# Patient Record
Sex: FEMALE | Race: WHITE | NOT HISPANIC OR LATINO | Employment: FULL TIME | URBAN - METROPOLITAN AREA
[De-identification: names, ages, dates, MRNs, and addresses within clinical notes are randomized per-mention and may not be internally consistent; named-entity substitution may affect disease eponyms.]

---

## 2019-06-18 ENCOUNTER — OFFICE VISIT (OUTPATIENT)
Dept: DERMATOLOGY | Facility: CLINIC | Age: 64
End: 2019-06-18
Payer: COMMERCIAL

## 2019-06-18 DIAGNOSIS — L82.1 SEBORRHEIC KERATOSIS: Primary | ICD-10-CM

## 2019-06-18 DIAGNOSIS — L91.8 SKIN TAG: ICD-10-CM

## 2019-06-18 DIAGNOSIS — Z13.89 SCREENING FOR SKIN CONDITION: ICD-10-CM

## 2019-06-18 PROCEDURE — 99203 OFFICE O/P NEW LOW 30 MIN: CPT | Performed by: DERMATOLOGY

## 2019-11-21 ENCOUNTER — APPOINTMENT (EMERGENCY)
Dept: RADIOLOGY | Facility: HOSPITAL | Age: 64
End: 2019-11-21
Payer: COMMERCIAL

## 2019-11-21 ENCOUNTER — HOSPITAL ENCOUNTER (EMERGENCY)
Facility: HOSPITAL | Age: 64
Discharge: HOME | End: 2019-11-21
Attending: EMERGENCY MEDICINE
Payer: COMMERCIAL

## 2019-11-21 VITALS
SYSTOLIC BLOOD PRESSURE: 185 MMHG | RESPIRATION RATE: 18 BRPM | HEART RATE: 66 BPM | WEIGHT: 179.4 LBS | BODY MASS INDEX: 29.89 KG/M2 | TEMPERATURE: 97.7 F | OXYGEN SATURATION: 100 % | DIASTOLIC BLOOD PRESSURE: 96 MMHG | HEIGHT: 65 IN

## 2019-11-21 DIAGNOSIS — I10 HIGH BLOOD PRESSURE: Primary | ICD-10-CM

## 2019-11-21 LAB
ALBUMIN SERPL BCP-MCNC: 4 G/DL (ref 3.5–5)
ALP SERPL-CCNC: 104 U/L (ref 46–116)
ALT SERPL W P-5'-P-CCNC: 33 U/L (ref 12–78)
ANION GAP SERPL CALCULATED.3IONS-SCNC: 8 MMOL/L (ref 4–13)
AST SERPL W P-5'-P-CCNC: 16 U/L (ref 5–45)
BASOPHILS # BLD AUTO: 0.04 THOUSANDS/ΜL (ref 0–0.1)
BASOPHILS NFR BLD AUTO: 1 % (ref 0–1)
BILIRUB SERPL-MCNC: 0.4 MG/DL (ref 0.2–1)
BUN SERPL-MCNC: 16 MG/DL (ref 5–25)
CALCIUM SERPL-MCNC: 9.8 MG/DL (ref 8.3–10.1)
CHLORIDE SERPL-SCNC: 105 MMOL/L (ref 100–108)
CO2 SERPL-SCNC: 29 MMOL/L (ref 21–32)
CREAT SERPL-MCNC: 0.64 MG/DL (ref 0.6–1.3)
EOSINOPHIL # BLD AUTO: 0.1 THOUSAND/ΜL (ref 0–0.61)
EOSINOPHIL NFR BLD AUTO: 2 % (ref 0–6)
ERYTHROCYTE [DISTWIDTH] IN BLOOD BY AUTOMATED COUNT: 12.3 % (ref 11.6–15.1)
GFR SERPL CREATININE-BSD FRML MDRD: 95 ML/MIN/1.73SQ M
GLUCOSE SERPL-MCNC: 104 MG/DL (ref 65–140)
HCT VFR BLD AUTO: 42.5 % (ref 34.8–46.1)
HGB BLD-MCNC: 14.1 G/DL (ref 11.5–15.4)
IMM GRANULOCYTES # BLD AUTO: 0.01 THOUSAND/UL (ref 0–0.2)
IMM GRANULOCYTES NFR BLD AUTO: 0 % (ref 0–2)
LYMPHOCYTES # BLD AUTO: 1.68 THOUSANDS/ΜL (ref 0.6–4.47)
LYMPHOCYTES NFR BLD AUTO: 31 % (ref 14–44)
MCH RBC QN AUTO: 30.3 PG (ref 26.8–34.3)
MCHC RBC AUTO-ENTMCNC: 33.2 G/DL (ref 31.4–37.4)
MCV RBC AUTO: 91 FL (ref 82–98)
MONOCYTES # BLD AUTO: 0.42 THOUSAND/ΜL (ref 0.17–1.22)
MONOCYTES NFR BLD AUTO: 8 % (ref 4–12)
NEUTROPHILS # BLD AUTO: 3.12 THOUSANDS/ΜL (ref 1.85–7.62)
NEUTS SEG NFR BLD AUTO: 58 % (ref 43–75)
NRBC BLD AUTO-RTO: 0 /100 WBCS
PLATELET # BLD AUTO: 284 THOUSANDS/UL (ref 149–390)
PMV BLD AUTO: 9.9 FL (ref 8.9–12.7)
POTASSIUM SERPL-SCNC: 3.9 MMOL/L (ref 3.5–5.3)
PROT SERPL-MCNC: 7.9 G/DL (ref 6.4–8.2)
RBC # BLD AUTO: 4.65 MILLION/UL (ref 3.81–5.12)
SODIUM SERPL-SCNC: 142 MMOL/L (ref 136–145)
TROPONIN I SERPL-MCNC: <0.02 NG/ML
WBC # BLD AUTO: 5.37 THOUSAND/UL (ref 4.31–10.16)

## 2019-11-21 PROCEDURE — 85025 COMPLETE CBC W/AUTO DIFF WBC: CPT | Performed by: EMERGENCY MEDICINE

## 2019-11-21 PROCEDURE — 99284 EMERGENCY DEPT VISIT MOD MDM: CPT

## 2019-11-21 PROCEDURE — 84484 ASSAY OF TROPONIN QUANT: CPT | Performed by: EMERGENCY MEDICINE

## 2019-11-21 PROCEDURE — 71046 X-RAY EXAM CHEST 2 VIEWS: CPT

## 2019-11-21 PROCEDURE — 99284 EMERGENCY DEPT VISIT MOD MDM: CPT | Performed by: EMERGENCY MEDICINE

## 2019-11-21 PROCEDURE — 36415 COLL VENOUS BLD VENIPUNCTURE: CPT | Performed by: EMERGENCY MEDICINE

## 2019-11-21 PROCEDURE — 93005 ELECTROCARDIOGRAM TRACING: CPT

## 2019-11-21 PROCEDURE — 80053 COMPREHEN METABOLIC PANEL: CPT | Performed by: EMERGENCY MEDICINE

## 2019-11-22 LAB
ATRIAL RATE: 69 BPM
P AXIS: 69 DEGREES
PR INTERVAL: 174 MS
QRS AXIS: 54 DEGREES
QRSD INTERVAL: 88 MS
QT INTERVAL: 418 MS
QTC INTERVAL: 447 MS
T WAVE AXIS: 66 DEGREES
VENTRICULAR RATE: 69 BPM

## 2019-11-22 PROCEDURE — 93010 ELECTROCARDIOGRAM REPORT: CPT | Performed by: INTERNAL MEDICINE

## 2019-11-22 NOTE — ED NOTES
Spoke with Joanie Granger from [facility] and informed her that patient was discharged       Maik Stevenson RN  11/21/19 2013

## 2019-11-22 NOTE — ED NOTES
Spoke with Dana Garcia RN from [facility] with update on patient        Sam Lam RN  11/21/19 2012 Statement Selected

## 2019-12-03 NOTE — ED PROVIDER NOTES
History  Chief Complaint   Patient presents with    High Blood Pressure     pt was [facility] and had BP checked and it was "high so they told me to come here" pt denies any symptoms at this time     62 eliel old female presenitng to the emergency department for eval of HTN  Recently checked into [facility] facility and had intake vitals with High bp, referred tot the er for eval  SHe has no Sx or complains, no Hx of HTN, last drink was several days ago but has never jenna into withdrawal, denies nausea/vomiting, diaphoresis or tremors  None       History reviewed  No pertinent past medical history  History reviewed  No pertinent surgical history  History reviewed  No pertinent family history  I have reviewed and agree with the history as documented  Social History     Tobacco Use    Smoking status: Current Every Day Smoker    Smokeless tobacco: Never Used   Substance Use Topics    Alcohol use: Not Currently    Drug use: Not on file        Review of Systems   Constitutional: Negative for appetite change, chills, fatigue and fever  HENT: Negative for sneezing and sore throat  Eyes: Negative for visual disturbance  Respiratory: Negative for cough, choking, chest tightness, shortness of breath and wheezing  Cardiovascular: Negative for chest pain and palpitations  Gastrointestinal: Negative for abdominal pain, constipation, diarrhea, nausea and vomiting  Genitourinary: Negative for difficulty urinating and dysuria  Neurological: Negative for dizziness, weakness, light-headedness, numbness and headaches  All other systems reviewed and are negative  Physical Exam  Physical Exam   Constitutional: She is oriented to person, place, and time  She appears well-developed and well-nourished  No distress  HENT:   Head: Normocephalic and atraumatic  Mouth/Throat: Oropharynx is clear and moist    Eyes: Pupils are equal, round, and reactive to light   EOM are normal    Neck: No JVD present  No tracheal deviation present  Cardiovascular: Normal rate, regular rhythm, normal heart sounds and intact distal pulses  Exam reveals no gallop and no friction rub  No murmur heard  Pulmonary/Chest: Effort normal and breath sounds normal  No respiratory distress  She has no wheezes  She has no rales  Abdominal: Soft  Bowel sounds are normal  She exhibits no distension  There is no tenderness  There is no rebound and no guarding  Neurological: She is alert and oriented to person, place, and time  No cranial nerve deficit  She exhibits normal muscle tone  Skin: Skin is warm and dry  She is not diaphoretic  No pallor  Psychiatric: She has a normal mood and affect  Her behavior is normal    Nursing note and vitals reviewed        Vital Signs  ED Triage Vitals [11/21/19 1705]   Temperature Pulse Respirations Blood Pressure SpO2   97 7 °F (36 5 °C) 83 18 (!) 179/104 99 %      Temp Source Heart Rate Source Patient Position - Orthostatic VS BP Location FiO2 (%)   Oral Monitor Sitting Left arm --      Pain Score       No Pain           Vitals:    11/21/19 1705 11/21/19 1930   BP: (!) 179/104 (!) 185/96   Pulse: 83 66   Patient Position - Orthostatic VS: Sitting Lying         Visual Acuity      ED Medications  Medications - No data to display    Diagnostic Studies  Results Reviewed     Procedure Component Value Units Date/Time    Troponin I [[id number]]  (Normal) Collected:  11/21/19 1842    Lab Status:  Final result Specimen:  Blood from Arm, Left Updated:  11/21/19 1909     Troponin I <0 02 ng/mL     Comprehensive metabolic panel [[id number]] Collected:  11/21/19 1842    Lab Status:  Final result Specimen:  Blood from Arm, Left Updated:  11/21/19 1907     Sodium 142 mmol/L      Potassium 3 9 mmol/L      Chloride 105 mmol/L      CO2 29 mmol/L      ANION GAP 8 mmol/L      BUN 16 mg/dL      Creatinine 0 64 mg/dL      Glucose 104 mg/dL      Calcium 9 8 mg/dL      AST 16 U/L ALT 33 U/L      Alkaline Phosphatase 104 U/L      Total Protein 7 9 g/dL      Albumin 4 0 g/dL      Total Bilirubin 0 40 mg/dL      eGFR 95 ml/min/1 73sq m     Narrative:       National Kidney Disease Foundation guidelines for Chronic Kidney Disease (CKD):     Stage 1 with normal or high GFR (GFR > 90 mL/min/1 73 square meters)    Stage 2 Mild CKD (GFR = 60-89 mL/min/1 73 square meters)    Stage 3A Moderate CKD (GFR = 45-59 mL/min/1 73 square meters)    Stage 3B Moderate CKD (GFR = 30-44 mL/min/1 73 square meters)    Stage 4 Severe CKD (GFR = 15-29 mL/min/1 73 square meters)    Stage 5 End Stage CKD (GFR <15 mL/min/1 73 square meters)  Note: GFR calculation is accurate only with a steady state creatinine    CBC and differential [[id number]] Collected:  11/21/19 1842    Lab Status:  Final result Specimen:  Blood from Arm, Left Updated:  11/21/19 1852     WBC 5 37 Thousand/uL      RBC 4 65 Million/uL      Hemoglobin 14 1 g/dL      Hematocrit 42 5 %      MCV 91 fL      MCH 30 3 pg      MCHC 33 2 g/dL      RDW 12 3 %      MPV 9 9 fL      Platelets 519 Thousands/uL      nRBC 0 /100 WBCs      Neutrophils Relative 58 %      Immat GRANS % 0 %      Lymphocytes Relative 31 %      Monocytes Relative 8 %      Eosinophils Relative 2 %      Basophils Relative 1 %      Neutrophils Absolute 3 12 Thousands/µL      Immature Grans Absolute 0 01 Thousand/uL      Lymphocytes Absolute 1 68 Thousands/µL      Monocytes Absolute 0 42 Thousand/µL      Eosinophils Absolute 0 10 Thousand/µL      Basophils Absolute 0 04 Thousands/µL                  XR chest 2 views   ED Interpretation by Terrence Lopez MD (11/21 0748)   No acute cardiopulmonary disease      Final Result by Zoraida Diana MD (11/21 2032)      No acute cardiopulmonary disease              Workstation performed: [id number]                    Procedures  Procedures         ED Course                               MDM  Number of Diagnoses or Management Options  High blood pressure:   Diagnosis management comments: 62year old female with asymptomatic HTN, will check labs, ekg, if unremarkable will clear for rehab        Disposition  Final diagnoses:   High blood pressure     Time reflects when diagnosis was documented in both MDM as applicable and the Disposition within this note     Time User Action Codes Description Comment    11/21/2019  7:37 PM Joe Hernadez Add [I10] High blood pressure       ED Disposition     ED Disposition Condition Date/Time Comment    Discharge Stable Thu Nov 21, 2019  7:37 PM [address] [place] [address] discharge to home/self care  Follow-up Information    None         There are no discharge medications for this patient  No discharge procedures on file      ED Provider  Electronically Signed by           Adi Grant MD  12/03/19 [id number]

## 2022-01-18 ENCOUNTER — OFFICE VISIT (OUTPATIENT)
Dept: FAMILY MEDICINE CLINIC | Facility: CLINIC | Age: 67
End: 2022-01-18
Payer: MEDICARE

## 2022-01-18 VITALS
OXYGEN SATURATION: 97 % | HEIGHT: 65 IN | WEIGHT: 180 LBS | HEART RATE: 70 BPM | BODY MASS INDEX: 29.99 KG/M2 | TEMPERATURE: 97 F | SYSTOLIC BLOOD PRESSURE: 182 MMHG | DIASTOLIC BLOOD PRESSURE: 98 MMHG

## 2022-01-18 DIAGNOSIS — Z12.11 COLON CANCER SCREENING: ICD-10-CM

## 2022-01-18 DIAGNOSIS — Z78.0 ASYMPTOMATIC POSTMENOPAUSAL STATE: ICD-10-CM

## 2022-01-18 DIAGNOSIS — Z12.31 BREAST CANCER SCREENING BY MAMMOGRAM: ICD-10-CM

## 2022-01-18 DIAGNOSIS — Z00.00 ENCOUNTER FOR MEDICARE ANNUAL WELLNESS EXAM: Primary | ICD-10-CM

## 2022-01-18 DIAGNOSIS — I10 PRIMARY HYPERTENSION: ICD-10-CM

## 2022-01-18 PROBLEM — E78.5 HYPERLIPIDEMIA: Status: ACTIVE | Noted: 2019-03-30

## 2022-01-18 PROCEDURE — G0403 EKG FOR INITIAL PREVENT EXAM: HCPCS | Performed by: FAMILY MEDICINE

## 2022-01-18 PROCEDURE — G0402 INITIAL PREVENTIVE EXAM: HCPCS | Performed by: FAMILY MEDICINE

## 2022-01-18 PROCEDURE — 1123F ACP DISCUSS/DSCN MKR DOCD: CPT | Performed by: FAMILY MEDICINE

## 2022-01-18 PROCEDURE — 99203 OFFICE O/P NEW LOW 30 MIN: CPT | Performed by: FAMILY MEDICINE

## 2022-01-18 PROCEDURE — 99497 ADVNCD CARE PLAN 30 MIN: CPT | Performed by: FAMILY MEDICINE

## 2022-01-18 RX ORDER — HYDROCHLOROTHIAZIDE 12.5 MG/1
12.5 TABLET ORAL DAILY
Qty: 30 TABLET | Refills: 0 | Status: SHIPPED | OUTPATIENT
Start: 2022-01-18 | End: 2022-04-29

## 2022-01-18 NOTE — PATIENT INSTRUCTIONS
Medicare Preventive Visit Patient Instructions  Thank you for completing your Welcome to Medicare Visit or Medicare Annual Wellness Visit today  Your next wellness visit will be due in one year (1/19/2023)  The screening/preventive services that you may require over the next 5-10 years are detailed below  Some tests may not apply to you based off risk factors and/or age  Screening tests ordered at today's visit but not completed yet may show as past due  Also, please note that scanned in results may not display below  Preventive Screenings:  Service Recommendations Previous Testing/Comments   Colorectal Cancer Screening  * Colonoscopy    * Fecal Occult Blood Test (FOBT)/Fecal Immunochemical Test (FIT)  * Fecal DNA/Cologuard Test  * Flexible Sigmoidoscopy Age: 54-65 years old   Colonoscopy: every 10 years (may be performed more frequently if at higher risk)  OR  FOBT/FIT: every 1 year  OR  Cologuard: every 3 years  OR  Sigmoidoscopy: every 5 years  Screening may be recommended earlier than age 48 if at higher risk for colorectal cancer  Also, an individualized decision between you and your healthcare provider will decide whether screening between the ages of 74-80 would be appropriate  Colonoscopy: Not on file  FOBT/FIT: Not on file  Cologuard: Not on file  Sigmoidoscopy: Not on file          Breast Cancer Screening Age: 36 years old  Frequency: every 1-2 years  Not required if history of left and right mastectomy Mammogram: Not on file        Cervical Cancer Screening Between the ages of 21-29, pap smear recommended once every 3 years  Between the ages of 33-67, can perform pap smear with HPV co-testing every 5 years     Recommendations may differ for women with a history of total hysterectomy, cervical cancer, or abnormal pap smears in past  Pap Smear: Not on file    Screening Not Indicated   Hepatitis C Screening Once for adults born between [facility]  More frequently in patients at high risk for Hepatitis C Hep C Antibody: Not on file        Diabetes Screening 1-2 times per year if you're at risk for diabetes or have pre-diabetes Fasting glucose: No results in last 5 years   A1C: No results in last 5 years        Cholesterol Screening Once every 5 years if you don't have a lipid disorder  May order more often based on risk factors  Lipid panel: Not on file          Other Preventive Screenings Covered by Medicare:  1  Abdominal Aortic Aneurysm (AAA) Screening: covered once if your at risk  You're considered to be at risk if you have a family history of AAA  2  Lung Cancer Screening: covers low dose CT scan once per year if you meet all of the following conditions: (1) Age 50-69; (2) No signs or symptoms of lung cancer; (3) Current smoker or have quit smoking within the last 15 years; (4) You have a tobacco smoking history of at least 30 pack years (packs per day multiplied by number of years you smoked); (5) You get a written order from a healthcare provider  3  Glaucoma Screening: covered annually if you're considered high risk: (1) You have diabetes OR (2) Family history of glaucoma OR (3)  aged 48 and older OR (3)  American aged 72 and older  3  Osteoporosis Screening: covered every 2 years if you meet one of the following conditions: (1) You're estrogen deficient and at risk for osteoporosis based off medical history and other findings; (2) Have a vertebral abnormality; (3) On glucocorticoid therapy for more than 3 months; (4) Have primary hyperparathyroidism; (5) On osteoporosis medications and need to assess response to drug therapy  · Last bone density test (DXA Scan): Not on file  5  HIV Screening: covered annually if you're between the age of 12-76  Also covered annually if you are younger than 13 and older than 72 with risk factors for HIV infection  For pregnant patients, it is covered up to 3 times per pregnancy      Immunizations:  Immunization Recommendations Influenza Vaccine Annual influenza vaccination during flu season is recommended for all persons aged >= 6 months who do not have contraindications   Pneumococcal Vaccine (Prevnar and Pneumovax)  * Prevnar = PCV13  * Pneumovax = PPSV23   Adults 25-60 years old: 1-3 doses may be recommended based on certain risk factors  Adults 72 years old: Prevnar (PCV13) vaccine recommended followed by Pneumovax (PPSV23) vaccine  If already received PPSV23 since turning 65, then PCV13 recommended at least one year after PPSV23 dose  Hepatitis B Vaccine 3 dose series if at intermediate or high risk (ex: diabetes, end stage renal disease, liver disease)   Tetanus (Td) Vaccine - COST NOT COVERED BY MEDICARE PART B Following completion of primary series, a booster dose should be given every 10 years to maintain immunity against tetanus  Td may also be given as tetanus wound prophylaxis  Tdap Vaccine - COST NOT COVERED BY MEDICARE PART B Recommended at least once for all adults  For pregnant patients, recommended with each pregnancy  Shingles Vaccine (Shingrix) - COST NOT COVERED BY MEDICARE PART B  2 shot series recommended in those aged 48 and above     Health Maintenance Due:      Topic Date Due    Hepatitis C Screening  Never done    HIV Screening  Never done    Breast Cancer Screening: Mammogram  Never done    Colorectal Cancer Screening  Never done     Immunizations Due:      Topic Date Due    COVID-19 Vaccine (1) Never done    DTaP,Tdap,and Td Vaccines (1 - Tdap) Never done    Influenza Vaccine (1) Never done    Pneumococcal Vaccine: 65+ Years (1 of 1 - PPSV23) Never done     Advance Directives   What are advance directives? Advance directives are legal documents that state your wishes and plans for medical care  These plans are made ahead of time in case you lose your ability to make decisions for yourself   Advance directives can apply to any medical decision, such as the treatments you want, and if you want to donate organs  What are the types of advance directives? There are many types of advance directives, and each state has rules about how to use them  You may choose a combination of any of the following:  · Living will: This is a written record of the treatment you want  You can also choose which treatments you do not want, which to limit, and which to stop at a certain time  This includes surgery, medicine, IV fluid, and tube feedings  · Durable power of  for healthcare [facility]): This is a written record that states who you want to make healthcare choices for you when you are unable to make them for yourself  This person, called a proxy, is usually a family member or a friend  You may choose more than 1 proxy  · Do not resuscitate (DNR) order:  A DNR order is used in case your heart stops beating or you stop breathing  It is a request not to have certain forms of treatment, such as CPR  A DNR order may be included in other types of advance directives  · Medical directive: This covers the care that you want if you are in a coma, near death, or unable to make decisions for yourself  You can list the treatments you want for each condition  Treatment may include pain medicine, surgery, blood transfusions, dialysis, IV or tube feedings, and a ventilator (breathing machine)  · Values history: This document has questions about your views, beliefs, and how you feel and think about life  This information can help others choose the care that you would choose  Why are advance directives important? An advance directive helps you control your care  Although spoken wishes may be used, it is better to have your wishes written down  Spoken wishes can be misunderstood, or not followed  Treatments may be given even if you do not want them  An advance directive may make it easier for your family to make difficult choices about your care     Fall Prevention    Fall prevention  includes ways to make your home and other areas safer  It also includes ways you can move more carefully to prevent a fall  Health conditions that cause changes in your blood pressure, vision, or muscle strength and coordination may increase your risk for falls  Medicines may also increase your risk for falls if they make you dizzy, weak, or sleepy  Fall prevention tips:   · Stand or sit up slowly  · Use assistive devices as directed  · Wear shoes that fit well and have soles that   · Wear a personal alarm  · Stay active  · Manage your medical conditions  Home Safety Tips:  · Add items to prevent falls in the bathroom  · Keep paths clear  · Install bright lights in your home  · Keep items you use often on shelves within reach  · Paint or place reflective tape on the edges of your stairs  Weight Management   Why it is important to manage your weight:  Being overweight increases your risk of health conditions such as heart disease, high blood pressure, type 2 diabetes, and certain types of cancer  It can also increase your risk for osteoarthritis, sleep apnea, and other respiratory problems  Aim for a slow, steady weight loss  Even a small amount of weight loss can lower your risk of health problems  How to lose weight safely:  A safe and healthy way to lose weight is to eat fewer calories and get regular exercise  You can lose up about 1 pound a week by decreasing the number of calories you eat by 500 calories each day  Healthy meal plan for weight management:  A healthy meal plan includes a variety of foods, contains fewer calories, and helps you stay healthy  A healthy meal plan includes the following:  · Eat whole-grain foods more often  A healthy meal plan should contain fiber  Fiber is the part of grains, fruits, and vegetables that is not broken down by your body  Whole-grain foods are healthy and provide extra fiber in your diet   Some examples of whole-grain foods are whole-wheat breads and pastas, oatmeal, brown rice, and bulgur  · Eat a variety of vegetables every day  Include dark, leafy greens such as spinach, kale, tanesha greens, and mustard greens  Eat yellow and orange vegetables such as carrots, sweet potatoes, and winter squash  · Eat a variety of fruits every day  Choose fresh or canned fruit (canned in its own juice or light syrup) instead of juice  Fruit juice has very little or no fiber  · Eat low-fat dairy foods  Drink fat-free (skim) milk or 1% milk  Eat fat-free yogurt and low-fat cottage cheese  Try low-fat cheeses such as mozzarella and other reduced-fat cheeses  · Choose meat and other protein foods that are low in fat  Choose beans or other legumes such as split peas or lentils  Choose fish, skinless poultry (chicken or turkey), or lean cuts of red meat (beef or pork)  Before you cook meat or poultry, cut off any visible fat  · Use less fat and oil  Try baking foods instead of frying them  Add less fat, such as margarine, sour cream, regular salad dressing and mayonnaise to foods  Eat fewer high-fat foods  Some examples of high-fat foods include french fries, doughnuts, ice cream, and cakes  · Eat fewer sweets  Limit foods and drinks that are high in sugar  This includes candy, cookies, regular soda, and sweetened drinks  Exercise:  Exercise at least 30 minutes per day on most days of the week  Some examples of exercise include walking, biking, dancing, and swimming  You can also fit in more physical activity by taking the stairs instead of the elevator or parking farther away from stores  Ask your healthcare provider about the best exercise plan for you  © Copyright [employer] 2018 Information is for End User's use only and may not be sold, redistributed or otherwise used for commercial purposes   All illustrations and images included in CareNotes® are the copyrighted property of A [employer] M [employer]  or [address] [place] [address]

## 2022-01-18 NOTE — PROGRESS NOTES
e Assessment and Plan:     Problem List Items Addressed This Visit     None      Visit Diagnoses     Encounter for Medicare annual wellness exam    -  Primary    Relevant Orders    POCT ECG    Primary hypertension        Relevant Orders    Lipid panel    Comprehensive metabolic panel    CBC and differential    TSH, 3rd generation with Free T4 reflex    POCT ECG    Breast cancer screening by mammogram        Relevant Orders    Mammo screening bilateral w 3d & cad    Colon cancer screening        Relevant Orders    Cologuard    Asymptomatic postmenopausal state        Relevant Orders    DXA bone density spine hip and pelvis        BMI Counseling: Body mass index is 29 95 kg/m²  The BMI is above normal  Nutrition recommendations include decreasing portion sizes, encouraging healthy choices of fruits and vegetables, consuming healthier snacks, limiting drinks that contain sugar, moderation in carbohydrate intake, increasing intake of lean protein and reducing intake of cholesterol  Exercise recommendations include moderate physical activity 150 minutes/week and exercising 3-5 times per week  No pharmacotherapy was ordered  Rationale for BMI follow-up plan is due to patient being overweight or obese  Depression Screening and Follow-up Plan: Patient was screened for depression during today's encounter  They screened negative with a PHQ-2 score of 0  Preventive health issues were discussed with patient, and age appropriate screening tests were ordered as noted in patient's After Visit Summary  Personalized health advice and appropriate referrals for health education or preventive services given if needed, as noted in patient's After Visit Summary  History of Present Illness:     Patient presents for Welcome to Medicare visit       Patient Care Team:  Talya Kendall DO as PCP - General (Family Medicine)     Review of Systems:     Review of Systems   Problem List:     Patient Active Problem List   Diagnosis    Hyperlipidemia      Past Medical and Surgical History:     Past Medical History:   Diagnosis Date    Hypertension      Past Surgical History:   Procedure Laterality Date    APPENDECTOMY        Family History:     Family History   Problem Relation Age of Onset    Heart disease Mother     Heart disease Father     Arrhythmia Son       Social History:     Social History     Socioeconomic History    Marital status: /Civil Union     Spouse name: None    Number of children: None    Years of education: None    Highest education level: None   Occupational History    None   Tobacco Use    Smoking status: Never Smoker    Smokeless tobacco: Never Used   Vaping Use    Vaping Use: Never used   Substance and Sexual Activity    Alcohol use: Yes     Alcohol/week: 10 0 standard drinks     Types: 10 Glasses of wine per week    Drug use: Never    Sexual activity: Yes     Partners: Male   Other Topics Concern    None   Social History Narrative    None     Social Determinants of Health     Financial Resource Strain: Not on file   Food Insecurity: Not on file   Transportation Needs: Not on file   Physical Activity: Not on file   Stress: Not on file   Social Connections: Not on file   Intimate Partner Violence: Not on file   Housing Stability: Not on file      Medications and Allergies:     Current Outpatient Medications   Medication Sig Dispense Refill    Digestive Enzymes (DIGESTIVE ENZYME PO) Take by mouth 3 (three) times a day Taken after meals      Probiotic Product (PROBIOTIC PO) Take by mouth in the morning       No current facility-administered medications for this visit  No Known Allergies   Immunizations: There is no immunization history on file for this patient     Health Maintenance:         Topic Date Due    Hepatitis C Screening  Never done    HIV Screening  Never done    Breast Cancer Screening: Mammogram  Never done    Colorectal Cancer Screening  Never done         Topic Date Due    COVID-19 Vaccine (1) Never done    DTaP,Tdap,and Td Vaccines (1 - Tdap) Never done    Influenza Vaccine (1) Never done    Pneumococcal Vaccine: 65+ Years (1 of 1 - PPSV23) Never done      Medicare Screening Tests and Risk Assessments:     Parish Babcock is here for her Welcome to Medicare visit  Health Risk Assessment:   Patient rates overall health as very good  Patient feels that their physical health rating is same  Patient is satisfied with their life  Eyesight was rated as same  Hearing was rated as same  Patient feels that their emotional and mental health rating is slightly worse  Patients states they are sometimes angry  Patient states they are never, rarely unusually tired/fatigued  Pain experienced in the last 7 days has been some  Patient's pain rating has been 3/10  Patient states that she has experienced weight loss or gain in last 6 months  Depression Screening:   PHQ-2 Score: 0      Fall Risk Screening: In the past year, patient has experienced: history of falling in past year    Number of falls: 1  Injured during fall?: Yes    Feels unsteady when standing or walking?: No    Worried about falling?: No      Urinary Incontinence Screening:   Patient has not leaked urine accidently in the last six months  Home Safety:  Patient does not have trouble with stairs inside or outside of their home  Patient has working smoke alarms and has working carbon monoxide detector  Home safety hazards include: none  Nutrition:   Current diet is Regular  Medications:   Patient is currently taking over-the-counter supplements  OTC medications include: see medication list  Patient is able to manage medications  Activities of Daily Living (ADLs)/Instrumental Activities of Daily Living (IADLs):   Walk and transfer into and out of bed and chair?: Yes  Dress and groom yourself?: Yes    Bathe or shower yourself?: Yes    Feed yourself?  Yes  Do your laundry/housekeeping?: Yes  Manage your money, pay your bills and track your expenses?: Yes  Make your own meals?: Yes    Do your own shopping?: Yes    Previous Hospitalizations:   Any hospitalizations or ED visits within the last 12 months?: No      Advance Care Planning:   Living will: No    Durable POA for healthcare: No    Advanced directive: No    Advanced directive counseling given: Yes    Five wishes given: Yes      Cognitive Screening:   Provider or family/friend/caregiver concerned regarding cognition?: No    PREVENTIVE SCREENINGS      Cardiovascular Screening:    General: Screening Not Indicated and History Lipid Disorder      Diabetes Screening:     General: Risks and Benefits Discussed      Colorectal Cancer Screening:     General: Risks and Benefits Discussed    Due for: Cologuard      Breast Cancer Screening:     General: Patient Declines    Due for: Mammogram        Cervical Cancer Screening:    General: Screening Not Indicated and Screening Current      Osteoporosis Screening:    General: Risks and Benefits Discussed    Due for: DXA Axial      Abdominal Aortic Aneurysm (AAA) Screening:        General: Screening Not Indicated      Lung Cancer Screening:     General: Screening Not Indicated    Screening, Brief Intervention, and Referral to Treatment (SBIRT)    Screening  Typical number of drinks in a day: 2  Typical number of drinks in a week: 10  Interpretation: Low risk drinking behavior  Single Item Drug Screening:  How often have you used an illegal drug (including marijuana) or a prescription medication for non-medical reasons in the past year? never    Single Item Drug Screen Score: 0  Interpretation: Negative screen for possible drug use disorder    Brief Intervention  Alcohol & drug use screenings were reviewed  No concerns regarding substance use disorder identified  Other Counseling Topics:   Car/seat belt/driving safety and sunscreen        Visual Acuity Screening    Right eye Left eye Both eyes   Without correction: 20/25 20/20 20/20 With correction:           Physical Exam:     BP (!) 182/98 (BP Location: Left arm, Patient Position: Sitting, Cuff Size: Adult)   Pulse 70   Temp (!) 97 °F (36 1 °C) (Tympanic)   Ht 5' 5" (1 651 m)   Wt 81 6 kg (180 lb)   SpO2 97%   BMI 29 95 kg/m²     Physical Exam  Vitals reviewed  Constitutional:       General: She is not in acute distress  Appearance: Normal appearance  HENT:      Head: Normocephalic and atraumatic  Right Ear: External ear normal       Left Ear: External ear normal       Nose: Nose normal       Mouth/Throat:      Mouth: Mucous membranes are moist    Eyes:      Extraocular Movements: Extraocular movements intact  Conjunctiva/sclera: Conjunctivae normal    Cardiovascular:      Rate and Rhythm: Normal rate and regular rhythm  Heart sounds: Normal heart sounds  Pulmonary:      Effort: Pulmonary effort is normal  No respiratory distress  Breath sounds: No wheezing, rhonchi or rales  Abdominal:      General: Bowel sounds are normal  There is no distension  Palpations: Abdomen is soft  Tenderness: There is no abdominal tenderness  Musculoskeletal:      Cervical back: Neck supple  Right lower leg: No edema  Left lower leg: No edema  Lymphadenopathy:      Cervical: No cervical adenopathy  Skin:     General: Skin is warm  Capillary Refill: Capillary refill takes less than 2 seconds  Neurological:      Mental Status: She is alert  Mental status is at baseline           Mario Floyd DO

## 2022-01-18 NOTE — PROGRESS NOTES
Assessment/Plan:    No problem-specific Assessment & Plan notes found for this encounter  Diagnoses and all orders for this visit:    Encounter for Medicare annual wellness exam  -     POCT ECG    Primary hypertension  -     Lipid panel; Future  -     Comprehensive metabolic panel; Future  -     CBC and differential; Future  -     TSH, 3rd generation with Free T4 reflex; Future  -     POCT ECG  -     hydrochlorothiazide (HYDRODIURIL) 12 5 mg tablet; Take 1 tablet (12 5 mg total) by mouth daily    Breast cancer screening by mammogram  -     Mammo screening bilateral w 3d & cad; Future    Colon cancer screening  -     Cologuard    Asymptomatic postmenopausal state  -     DXA bone density spine hip and pelvis; Future    Other orders  -     Probiotic Product (PROBIOTIC PO); Take by mouth in the morning  -     Digestive Enzymes (DIGESTIVE ENZYME PO); Take by mouth 3 (three) times a day Taken after meals        Subjective:      Patient ID: Unique Pritchard is a 73 y o  female  HPI   Patient presents to the office to establish care  She has a history of elevated BP  Notes that she was previously one lowest dose of lisinopril about 3 years ago and she was having dizziness with standing  States that she has gained about 20 lbs in the last 1 5 years  Notes that she does not like prescription medications  Denies chest pain, SOB, lightheadedness  Notes that her mother and father both had CAD  She has 4 healthy siblings  The following portions of the patient's history were reviewed and updated as appropriate:   She  has a past medical history of Hypertension  She   Patient Active Problem List    Diagnosis Date Noted    Hyperlipidemia 03/30/2019     She  has a past surgical history that includes Appendectomy  Her family history includes Arrhythmia in her son; Heart disease in her father and mother  She  reports that she has never smoked   She has never used smokeless tobacco  She reports current alcohol use of about 10 0 standard drinks of alcohol per week  She reports that she does not use drugs  Current Outpatient Medications   Medication Sig Dispense Refill    Digestive Enzymes (DIGESTIVE ENZYME PO) Take by mouth 3 (three) times a day Taken after meals      Probiotic Product (PROBIOTIC PO) Take by mouth in the morning      hydrochlorothiazide (HYDRODIURIL) 12 5 mg tablet Take 1 tablet (12 5 mg total) by mouth daily 30 tablet 0     No current facility-administered medications for this visit  She has No Known Allergies  Review of Systems   Constitutional: Negative for chills, fatigue and fever  HENT: Negative for congestion, ear pain, rhinorrhea and sore throat  Eyes: Negative for pain  Respiratory: Negative for cough and shortness of breath  Cardiovascular: Negative for chest pain and leg swelling  Gastrointestinal: Negative for abdominal pain, constipation, diarrhea, nausea and vomiting  Genitourinary: Negative for dysuria, frequency and urgency  Musculoskeletal: Negative for gait problem  Skin: Negative for rash  Neurological: Negative for dizziness, light-headedness and headaches  Objective:  BP (!) 182/98 (BP Location: Left arm, Patient Position: Sitting, Cuff Size: Adult)   Pulse 70   Temp (!) 97 °F (36 1 °C) (Tympanic)   Ht 5' 5" (1 651 m)   Wt 81 6 kg (180 lb)   SpO2 97%   BMI 29 95 kg/m²      Physical Exam  Vitals reviewed  Constitutional:       General: She is not in acute distress  Appearance: Normal appearance  HENT:      Head: Normocephalic and atraumatic  Right Ear: External ear normal       Left Ear: External ear normal       Nose: Nose normal       Mouth/Throat:      Mouth: Mucous membranes are moist    Eyes:      Extraocular Movements: Extraocular movements intact  Conjunctiva/sclera: Conjunctivae normal       Pupils: Pupils are equal, round, and reactive to light     Cardiovascular:      Rate and Rhythm: Normal rate and regular rhythm  Heart sounds: Normal heart sounds  Pulmonary:      Effort: Pulmonary effort is normal       Breath sounds: Normal breath sounds  No wheezing, rhonchi or rales  Abdominal:      General: Abdomen is flat  Bowel sounds are normal  There is no distension  Palpations: Abdomen is soft  Tenderness: There is no abdominal tenderness  Musculoskeletal:         General: No deformity  Cervical back: Neck supple  Right lower leg: No edema  Left lower leg: No edema  Lymphadenopathy:      Cervical: No cervical adenopathy  Skin:     General: Skin is warm  Capillary Refill: Capillary refill takes less than 2 seconds  Findings: No rash  Neurological:      Mental Status: She is alert  Mental status is at baseline           Nate Alexis DO  [facility]  1/18/2022 10:42 AM

## 2022-01-20 ENCOUNTER — TELEPHONE (OUTPATIENT)
Dept: FAMILY MEDICINE CLINIC | Facility: CLINIC | Age: 67
End: 2022-01-20

## 2022-01-20 NOTE — TELEPHONE ENCOUNTER
Pt left a message requesting a call back  She said her labs order have the wrong insurance and she provided her new insurance information yesterday at her visit  Please call her back  Thank you

## 2022-01-21 ENCOUNTER — APPOINTMENT (OUTPATIENT)
Dept: LAB | Facility: CLINIC | Age: 67
End: 2022-01-21
Payer: MEDICARE

## 2022-01-21 DIAGNOSIS — I10 PRIMARY HYPERTENSION: ICD-10-CM

## 2022-01-21 LAB
ALBUMIN SERPL BCP-MCNC: 4.1 G/DL (ref 3.5–5)
ALP SERPL-CCNC: 108 U/L (ref 46–116)
ALT SERPL W P-5'-P-CCNC: 35 U/L (ref 12–78)
ANION GAP SERPL CALCULATED.3IONS-SCNC: 4 MMOL/L (ref 4–13)
AST SERPL W P-5'-P-CCNC: 17 U/L (ref 5–45)
BASOPHILS # BLD AUTO: 0.04 THOUSANDS/ΜL (ref 0–0.1)
BASOPHILS NFR BLD AUTO: 1 % (ref 0–1)
BILIRUB SERPL-MCNC: 0.54 MG/DL (ref 0.2–1)
BUN SERPL-MCNC: 15 MG/DL (ref 5–25)
CALCIUM SERPL-MCNC: 10.8 MG/DL (ref 8.3–10.1)
CHLORIDE SERPL-SCNC: 106 MMOL/L (ref 100–108)
CHOLEST SERPL-MCNC: 289 MG/DL
CO2 SERPL-SCNC: 27 MMOL/L (ref 21–32)
CREAT SERPL-MCNC: 0.67 MG/DL (ref 0.6–1.3)
EOSINOPHIL # BLD AUTO: 0.11 THOUSAND/ΜL (ref 0–0.61)
EOSINOPHIL NFR BLD AUTO: 2 % (ref 0–6)
ERYTHROCYTE [DISTWIDTH] IN BLOOD BY AUTOMATED COUNT: 12.1 % (ref 11.6–15.1)
GFR SERPL CREATININE-BSD FRML MDRD: 92 ML/MIN/1.73SQ M
GLUCOSE P FAST SERPL-MCNC: 119 MG/DL (ref 65–99)
HCT VFR BLD AUTO: 44 % (ref 34.8–46.1)
HDLC SERPL-MCNC: 44 MG/DL
HGB BLD-MCNC: 14.4 G/DL (ref 11.5–15.4)
IMM GRANULOCYTES # BLD AUTO: 0.01 THOUSAND/UL (ref 0–0.2)
IMM GRANULOCYTES NFR BLD AUTO: 0 % (ref 0–2)
LDLC SERPL CALC-MCNC: 193 MG/DL (ref 0–100)
LYMPHOCYTES # BLD AUTO: 1.59 THOUSANDS/ΜL (ref 0.6–4.47)
LYMPHOCYTES NFR BLD AUTO: 34 % (ref 14–44)
MCH RBC QN AUTO: 30.3 PG (ref 26.8–34.3)
MCHC RBC AUTO-ENTMCNC: 32.7 G/DL (ref 31.4–37.4)
MCV RBC AUTO: 93 FL (ref 82–98)
MONOCYTES # BLD AUTO: 0.4 THOUSAND/ΜL (ref 0.17–1.22)
MONOCYTES NFR BLD AUTO: 9 % (ref 4–12)
NEUTROPHILS # BLD AUTO: 2.56 THOUSANDS/ΜL (ref 1.85–7.62)
NEUTS SEG NFR BLD AUTO: 54 % (ref 43–75)
NONHDLC SERPL-MCNC: 245 MG/DL
NRBC BLD AUTO-RTO: 0 /100 WBCS
PLATELET # BLD AUTO: 335 THOUSANDS/UL (ref 149–390)
PMV BLD AUTO: 10.4 FL (ref 8.9–12.7)
POTASSIUM SERPL-SCNC: 4.4 MMOL/L (ref 3.5–5.3)
PROT SERPL-MCNC: 8.1 G/DL (ref 6.4–8.2)
RBC # BLD AUTO: 4.75 MILLION/UL (ref 3.81–5.12)
SODIUM SERPL-SCNC: 137 MMOL/L (ref 136–145)
TRIGL SERPL-MCNC: 259 MG/DL
TSH SERPL DL<=0.05 MIU/L-ACNC: 3.4 UIU/ML (ref 0.36–3.74)
WBC # BLD AUTO: 4.71 THOUSAND/UL (ref 4.31–10.16)

## 2022-01-21 PROCEDURE — 84443 ASSAY THYROID STIM HORMONE: CPT

## 2022-01-21 PROCEDURE — 80053 COMPREHEN METABOLIC PANEL: CPT

## 2022-01-21 PROCEDURE — 85025 COMPLETE CBC W/AUTO DIFF WBC: CPT

## 2022-01-21 PROCEDURE — 80061 LIPID PANEL: CPT

## 2022-01-21 PROCEDURE — 36415 COLL VENOUS BLD VENIPUNCTURE: CPT

## 2022-01-24 NOTE — TELEPHONE ENCOUNTER
Spoke to pt  Corrected primary and secondary insurance in chart  Pt is asking about Test results -   We are still waiting for the result of an additional panel that was completed yesterday  Once that is final, PCP will send us a result note and pt will be contacted with her advisement

## 2022-01-28 ENCOUNTER — TELEPHONE (OUTPATIENT)
Dept: FAMILY MEDICINE CLINIC | Facility: CLINIC | Age: 67
End: 2022-01-28

## 2022-01-28 PROBLEM — R73.01 IMPAIRED FASTING GLUCOSE: Status: ACTIVE | Noted: 2022-01-28

## 2022-01-31 ENCOUNTER — TELEPHONE (OUTPATIENT)
Dept: FAMILY MEDICINE CLINIC | Facility: CLINIC | Age: 67
End: 2022-01-31

## 2022-01-31 NOTE — TELEPHONE ENCOUNTER
T/c from pt - pt wants Cologuard  test results emailed to her :   [email or web address]     Test results pending

## 2022-02-02 LAB — COLOGUARD RESULT REPORTABLE: NEGATIVE

## 2022-02-22 ENCOUNTER — TELEPHONE (OUTPATIENT)
Dept: FAMILY MEDICINE CLINIC | Facility: CLINIC | Age: 67
End: 2022-02-22

## 2022-02-28 ENCOUNTER — HOSPITAL ENCOUNTER (OUTPATIENT)
Dept: RADIOLOGY | Facility: IMAGING CENTER | Age: 67
Discharge: HOME | End: 2022-02-28
Payer: MEDICARE

## 2022-02-28 VITALS — WEIGHT: 168 LBS | BODY MASS INDEX: 27.99 KG/M2 | HEIGHT: 65 IN

## 2022-02-28 DIAGNOSIS — Z12.31 BREAST CANCER SCREENING BY MAMMOGRAM: ICD-10-CM

## 2022-02-28 PROCEDURE — 77063 BREAST TOMOSYNTHESIS BI: CPT

## 2022-02-28 PROCEDURE — 77067 SCR MAMMO BI INCL CAD: CPT

## 2022-03-08 ENCOUNTER — HOSPITAL ENCOUNTER (OUTPATIENT)
Dept: ULTRASOUND IMAGING | Facility: CLINIC | Age: 67
Discharge: HOME | End: 2022-03-08
Payer: MEDICARE

## 2022-03-08 DIAGNOSIS — R92.8 ABNORMAL MAMMOGRAM: ICD-10-CM

## 2022-03-08 PROCEDURE — 76642 ULTRASOUND BREAST LIMITED: CPT

## 2022-03-08 NOTE — PROGRESS NOTES
Met with patient and Dr Marely Roberts regarding recommendation for;    __X___ RIGHT ______LEFT      __X___Ultrasound guided  ______Stereotactic breast biopsy  __X___Verbalized understanding        Blood thinners:  No: __X___ Yes: ______ What:                 Biopsy teaching sheet given:  Yes: ___X___ No: ________    Pt given contact information and adv to call with any questions/needs

## 2022-03-10 ENCOUNTER — TELEPHONE (OUTPATIENT)
Dept: MAMMOGRAPHY | Facility: CLINIC | Age: 67
End: 2022-03-10

## 2022-03-10 NOTE — TELEPHONE ENCOUNTER
Call placed to patient to discuss request to cancel biopsy for 3/18, explained to patient importance of coming for biopsy, explained to patient that area of concern is highly suspicious for cancer but only way to determine this is through tissue sample, pt questioning why her mammogram report does not say suspicious for cancer, adv that mammogram report shows a mass in her right breast and that is why she was brought back for the ultrasound, the ultrasound shows us different views, pt wants a copy of her imaging to see, adv pt that we could have her sit down with the radiologist who could show her the imaging when she comes in for her biopsy on 1/18 or if she wants a copy of her disc she could obtain that from medical records however she is not able to view this on a regular computer, pt asking about second opinion, adv she could obtain a second opinion if she would like, I asked pt if she reached out to Dr Dipti Olivarez as she ordered her imaging, pt stated "no but she hasn't reached out to me either", adv pt that if she has further questions or concerns she could reach out to their office as they ordered the imaging and they get copies of the reports and maybe could help with any additional questions she may have, pt states she will keep the biopsy appt at this time, has name/# for any additional questions        I will forward this conversation to Dr Dipti Olivarez to have their office reach out to patient

## 2022-03-10 NOTE — TELEPHONE ENCOUNTER
Reached out to patient  Discussed mammogram findings and plan for biopsy  Patient very hesitant for biopsy, would like second opinion  After further discussion and explanation, patient is agreeable to complete biopsy as currently scheduled  States that she understands that risk and would not like to delay diagnosis  Notes that she was feeling overwhelmed and just needed to wrap her head around things       Mary Soto DO  [facility]  3/10/2022 10:37 AM

## 2022-03-16 ENCOUNTER — TELEPHONE (OUTPATIENT)
Dept: MAMMOGRAPHY | Facility: CLINIC | Age: 67
End: 2022-03-16

## 2022-03-18 ENCOUNTER — HOSPITAL ENCOUNTER (OUTPATIENT)
Dept: ULTRASOUND IMAGING | Facility: CLINIC | Age: 67
Discharge: HOME | End: 2022-03-18

## 2022-03-22 ENCOUNTER — TELEPHONE (OUTPATIENT)
Dept: MAMMOGRAPHY | Facility: CLINIC | Age: 67
End: 2022-03-22

## 2022-03-23 NOTE — PROGRESS NOTES
Follow up call placed to patient after secondary review of imaging by Dr Roseanna Leblanc per patient request, regarding recommendation for;    __X___ RIGHT ______LEFT      __X___Ultrasound guided  ______Stereotactic breast biopsy x2  Procedure was explained to patient at office visit on 3/8/22, additional questions answered at this time    __X___Verbalized understanding        Blood thinners: No: __X___ Yes: _____ What:     Biopsy teaching sheet given:  _____yes __X__no (All teaching points discussed during call, pt with no questions at this time, pt adv to arrive at [facility] for 0830 biopsy)    Pt given name/# for any further questions/needs

## 2022-03-23 NOTE — TELEPHONE ENCOUNTER
Call placed to patient, adv that Dr Alycia Chapa completed review of her studies, he agrees with recommendation for biopsy, he will be happy to do biopsy if she wants, pt with multiple questions about why this shows up on mammogram imaging and why this was not characterized as a cancer on that report, again reviewed with patient that mammogram report shows a mass in her right breast and that is why she was brought back for the ultrasound, the ultrasound shows us different views and that shows up that area is highly suspicious for cancer, pt asking many different questions related to vascularity of area and wants answers to if we look at this area with color, adv that they do ultrasound with color to check vascularity, wanted to know the results of that and wants to know why this is not in the report, states she expected a phone call from the radiologist to review her report, states she has been doing a lot of research and she wants her questions answered, pt also states that her breasts have been scanned for multiple years and this hasn't been seen before, adv that I would get Dr Alycia Chapa to talk to her, contacted Dr Alycia Chapa who asked for her number and would call her back after finishing with a patient, pt adv of such and will wait for cb, pt with no further questions for me at this time, has name/# for cb

## 2022-03-31 ENCOUNTER — APPOINTMENT (OUTPATIENT)
Dept: LAB | Facility: CLINIC | Age: 67
End: 2022-03-31
Payer: MEDICARE

## 2022-03-31 DIAGNOSIS — R73.01 IMPAIRED FASTING GLUCOSE: ICD-10-CM

## 2022-03-31 LAB
EST. AVERAGE GLUCOSE BLD GHB EST-MCNC: 114 MG/DL
HBA1C MFR BLD: 5.6 %

## 2022-03-31 PROCEDURE — 36415 COLL VENOUS BLD VENIPUNCTURE: CPT

## 2022-03-31 PROCEDURE — 83036 HEMOGLOBIN GLYCOSYLATED A1C: CPT

## 2022-04-04 ENCOUNTER — TELEPHONE (OUTPATIENT)
Dept: FAMILY MEDICINE CLINIC | Facility: CLINIC | Age: 67
End: 2022-04-04

## 2022-04-04 NOTE — TELEPHONE ENCOUNTER
Pt came into office asking about blood work results -- PT requested phone call to have someone explain results to her from 03/31/2022 HEMOGLOBIN A1C W/ EAG ESTIMATION

## 2022-04-12 ENCOUNTER — HOSPITAL ENCOUNTER (OUTPATIENT)
Dept: ULTRASOUND IMAGING | Facility: CLINIC | Age: 67
Discharge: HOME | End: 2022-04-12
Admitting: RADIOLOGY
Payer: MEDICARE

## 2022-04-12 ENCOUNTER — HOSPITAL ENCOUNTER (OUTPATIENT)
Dept: MAMMOGRAPHY | Facility: CLINIC | Age: 67
Discharge: HOME | End: 2022-04-12

## 2022-04-12 ENCOUNTER — TELEPHONE (OUTPATIENT)
Dept: MAMMOGRAPHY | Facility: CLINIC | Age: 67
End: 2022-04-12

## 2022-04-12 VITALS — DIASTOLIC BLOOD PRESSURE: 109 MMHG | SYSTOLIC BLOOD PRESSURE: 219 MMHG | HEART RATE: 75 BPM

## 2022-04-12 DIAGNOSIS — R92.8 ABNORMAL MAMMOGRAM: ICD-10-CM

## 2022-04-12 PROCEDURE — 88305 TISSUE EXAM BY PATHOLOGIST: CPT | Performed by: PATHOLOGY

## 2022-04-12 PROCEDURE — 88361 TUMOR IMMUNOHISTOCHEM/COMPUT: CPT | Performed by: PATHOLOGY

## 2022-04-12 PROCEDURE — 88341 IMHCHEM/IMCYTCHM EA ADD ANTB: CPT | Performed by: PATHOLOGY

## 2022-04-12 PROCEDURE — 19083 BX BREAST 1ST LESION US IMAG: CPT

## 2022-04-12 PROCEDURE — A4648 IMPLANTABLE TISSUE MARKER: HCPCS

## 2022-04-12 PROCEDURE — 88342 IMHCHEM/IMCYTCHM 1ST ANTB: CPT | Performed by: PATHOLOGY

## 2022-04-12 PROCEDURE — 19084 BX BREAST ADD LESION US IMAG: CPT

## 2022-04-12 RX ORDER — LIDOCAINE HYDROCHLORIDE 10 MG/ML
5 INJECTION, SOLUTION EPIDURAL; INFILTRATION; INTRACAUDAL; PERINEURAL ONCE
Status: COMPLETED | OUTPATIENT
Start: 2022-04-12 | End: 2022-04-12

## 2022-04-12 RX ADMIN — LIDOCAINE HYDROCHLORIDE 5 ML: 10 INJECTION, SOLUTION EPIDURAL; INFILTRATION; INTRACAUDAL; PERINEURAL at 09:05

## 2022-04-12 RX ADMIN — LIDOCAINE HYDROCHLORIDE 5 ML: 10 INJECTION, SOLUTION EPIDURAL; INFILTRATION; INTRACAUDAL; PERINEURAL at 09:09

## 2022-04-12 NOTE — PROGRESS NOTES
Procedure type:    __x___ultrasound guided _____stereotactic    Breast:    _____Left ___x__Right    Location: 8 o'clock 3 cmfn    Needle: 12 gauge annamaria     # of passes: 3    Clip: SHAILESH      Performed by: Dr Michel Francois held for 5 minutes by: Eliza Mathis Strips:    ___x__yes _____no    Band aid:    __x___yes_____no    Tape and guaze:    _____yes __x___no    Tolerated procedure:    __x___yes _____no

## 2022-04-12 NOTE — TELEPHONE ENCOUNTER
Attempted to reach patient- did not answer the phone and voicemail not set up  Unable to leave a voicemail for the patient

## 2022-04-12 NOTE — PROGRESS NOTES
Procedure type:    __x___ultrasound guided _____stereotactic    Breast:    _____Left ___x__Right    Location: 6 o'clock     Needle: 12 gauge annamaria     # of passes: 2    Clip: Heart     Performed by: Dr Sunil Bain held for 5 minutes by: Santi Azul    Sterchely Strips:    ___x__yes _____no    Band aid:    __x___yes_____no    Tape and guaze:    _____yes ___x__no    Tolerated procedure:    ___x__yes _____no

## 2022-04-12 NOTE — PROGRESS NOTES
Ice pack given:    __x___yes _____no    Discharge instructions signed by patient:    ___x__yes _____no    Discharge instructions given to patient:    ___x__yes _____no    Discharged via:    __x___ambulatory    _____wheelchair    _____stretcher    Stable on discharge:    __x___yes ____no  Patient would like results over the phone  Ok to leave a message

## 2022-04-12 NOTE — DISCHARGE INSTR - OTHER ORDERS
POST LARGE CORE BREAST BIOPSY PATIENT INFORMATION      Place an ice pack inside your bra over the top of the dressing every hour for 20 minutes (20 minutes on, 60 minutes off)  Do this until bedtime  Do not shower or bathe until the following morning  After bathing, you may remove the bandaid  You may bathe your breast carefully with the steri-strips in place  Be careful not to loosen them  The steri-strips will fall off in 3-5 days  You may have mild discomfort, and you may have some bruising where the needle entered the skin  This should clear within 5-7 days  If you need medicine for discomfort, take acetaminophen products such as Tylenol  You may also take Advil or Motrin products  DO NOT use Aspirin for the first 24 hours  Do not participate in strenuous activities such as-tennis, aerobics, weight lifting, etc  for 24 hours  Refrain from swimming/soaking for 72 hours  Wearing a bra for sleeping may be more comfortable for the first 24-48 hours after your biopsy  Watch for continued bleeding, pain or fever over 101  If any of these symptoms occur, please contact our breast nurse navigator at the location where your biopsy was performed  During normal business hours (7:30am - 4:00pm) please call the nurse navigator at the site     where your procedure was performed:       [address]: [phone] or [phone] [address]: [phone] or [phone]     [address]: [address] [facility]: [address] [facility] [address]: [phone]        After 4pm - please call your physician or go to the nearest Emergency Department location  The final results of your biopsy are usually available within one week

## 2022-04-12 NOTE — TELEPHONE ENCOUNTER
She can increase her home HCTZ to 25 mg daily  She should have an appointment to be seen  If she is having chest pain, headache or SOB, she needs to be evaluated this evening       Cameron Salas DO  [facility]  4/12/2022 3:01 PM

## 2022-04-13 NOTE — PROGRESS NOTES
Post procedure call completed    Bleeding: _____yes __X___no (pt denies)    Pain: _____yes ___X___no (pt denies, used ice yesterday, no need for OTC pain meds)    Redness/Swelling: ______yes ___X___no (pt states a small bruise)    Band aid removed: _____yes __X___no (discussed removing when she showers)    Steri-Strips intact: ___X___yes _____no (discussed with patient to remove steri strips on Sunday if they have not come off on their own)    Pt with no questions at this time, adv will call when results available, adv to call with any questions or concerns, has name/# for contact

## 2022-04-13 NOTE — TELEPHONE ENCOUNTER
With blood pressures that high, she is absolutely at risk for stroke, heart attack and possibly death  Blood pressure that high has the potential to cause long term damage to her heart, kidneys, eyes, brain  It is crucial that her BP is lowered       Marie Vallejo DO  [facility]  4/13/2022 9:53 AM

## 2022-04-13 NOTE — TELEPHONE ENCOUNTER
I spoke with pt  Pt states that she has never started the BP medication  Pt states she never even picked up the initial rx from the pharmacy  Pt states she is under a lot of stress and refuses to take the HCTZ at this time  Pt states "I lost 15 pounds and don't feel like a need it " Pt declines an appt at this time, but states she is willing to keep a log of her BP for the next few days, and give us a call with the readings

## 2022-04-13 NOTE — TELEPHONE ENCOUNTER
Pt has been notified  Pt agrees to come in for a follow up appt, but declines starting any pharmaceutical medication

## 2022-04-15 ENCOUNTER — TELEPHONE (OUTPATIENT)
Dept: MAMMOGRAPHY | Facility: CLINIC | Age: 67
End: 2022-04-15

## 2022-04-15 ENCOUNTER — TELEPHONE (OUTPATIENT)
Dept: FAMILY MEDICINE CLINIC | Facility: CLINIC | Age: 67
End: 2022-04-15

## 2022-04-15 ENCOUNTER — TELEPHONE (OUTPATIENT)
Dept: HEMATOLOGY ONCOLOGY | Facility: CLINIC | Age: 67
End: 2022-04-15

## 2022-04-15 DIAGNOSIS — F41.9 ANXIETY: Primary | ICD-10-CM

## 2022-04-15 DIAGNOSIS — I10 PRIMARY HYPERTENSION: ICD-10-CM

## 2022-04-15 RX ORDER — LORAZEPAM 0.5 MG/1
0.5 TABLET ORAL ONCE
Qty: 1 TABLET | Refills: 0 | Status: SHIPPED | OUTPATIENT
Start: 2022-04-15 | End: 2022-05-18

## 2022-04-15 RX ORDER — LISINOPRIL 10 MG/1
10 TABLET ORAL DAILY
Qty: 30 TABLET | Refills: 2 | Status: SHIPPED | OUTPATIENT
Start: 2022-04-15 | End: 2022-04-20

## 2022-04-15 NOTE — TELEPHONE ENCOUNTER
[facility]    Diagnosis: Right invasive ductal    Is this diagnosis cancer (Y/N): Yes    Biopsy Date: 04/12/22    Does the patient have another biopsy pending: No  If so, when:    Preferred provider: Dr Rock Caldwell    Preferred location: [facility]    Any requests for dates/times: First available    Any additional information: N/A    Please advise when appointment is made DENNYS (obstructive sleep apnea)

## 2022-04-15 NOTE — TELEPHONE ENCOUNTER
Soft Intake Form   Patient Details   Luiza Lott     1955     Reason For Appointment   Who is Calling? Patient   If not patient, Name?  n/a   DID YOU CONFIRM INSURANCE WITH PATIENT? yes   Who is the Referring Doctor? [facility]   What is the diagnosis? Right invasive ductal   Has this diagnosis been confirmed by a biopsy/surgery? If yes, what is the date it was done? Yes, 4/12/22   Biopsy done at [facility]? If not, where was it done? yes   Was imaging done, and was it done at [address] [place] [address]? If not, where was it done? yes   Have you been seen by another Oncologist?  If so, who and where (name of facility, city and state) no   For 2nd Opinions Only: Are you currently undergoing treatment, or are you scheduled to start treatment? If yes, name of facility, city and state n/a    For "History Of" only: Have you completed treatment? n/a    Have you had Genetic Testing done in the past?  If so, advise to bring test results to their visit no   Record Gathering Information   Did you advise to have records faxed to [phone]? no   Did you advise to have disks sent to the proper address with imaging? ("History of" Patients)  5 years of imaging for breast patients-Mammos, US etc no   Scheduling Information   Did you send new patient paperwork? Email or mail? Yes , E-mail    What is the best call back number?    (If the [facility] is calling, please use their number) [phone]   Miscellaneous Information    Scheduled appointment  4/20/22 10:30 am

## 2022-04-15 NOTE — TELEPHONE ENCOUNTER
4/15 1409 vmm from patient asking about her breast biopsy results, cb [phone]    Call placed to patient and she was given biopsy results, questions answered, adv next step is to set patient up with surgeon, options discussed of Dr Severiano Byes, pt would like first available appt, adv patient that  would call her back by tomorrow with appt, pt states understanding, pt with no questions at this time, has name/# for any further needs

## 2022-04-18 PROBLEM — C50.511 MALIGNANT NEOPLASM OF LOWER-OUTER QUADRANT OF RIGHT BREAST OF FEMALE, ESTROGEN RECEPTOR POSITIVE (HCC): Status: ACTIVE | Noted: 2022-04-18

## 2022-04-18 PROBLEM — Z17.0 MALIGNANT NEOPLASM OF LOWER-OUTER QUADRANT OF RIGHT BREAST OF FEMALE, ESTROGEN RECEPTOR POSITIVE (HCC): Status: ACTIVE | Noted: 2022-04-18

## 2022-04-18 NOTE — TELEPHONE ENCOUNTER
Intake received  Pt has active medicare A & B  Pt also has an active supplemental plan N thru [employer]  Pt is resp for the part B deduct  called the medicare automated #   Pt has met the $233 part B deduct  The supplemental plan will pay the 20% that medicare doesn't pay  Insurance education outreach not needed at this time

## 2022-04-19 ENCOUNTER — DOCUMENTATION (OUTPATIENT)
Dept: HEMATOLOGY ONCOLOGY | Facility: CLINIC | Age: 67
End: 2022-04-19

## 2022-04-19 NOTE — TELEPHONE ENCOUNTER
Intake received- chart reviewed    Outside records needed? No    Pathology complete? Yes    Imaging complete?  Yes

## 2022-04-20 ENCOUNTER — TELEPHONE (OUTPATIENT)
Dept: HEMATOLOGY ONCOLOGY | Facility: CLINIC | Age: 67
End: 2022-04-20

## 2022-04-20 ENCOUNTER — HOSPITAL ENCOUNTER (OUTPATIENT)
Dept: RADIOLOGY | Facility: HOSPITAL | Age: 67
Discharge: HOME | End: 2022-04-20
Payer: MEDICARE

## 2022-04-20 ENCOUNTER — CONSULT (OUTPATIENT)
Dept: SURGICAL ONCOLOGY | Facility: CLINIC | Age: 67
End: 2022-04-20
Payer: MEDICARE

## 2022-04-20 ENCOUNTER — OFFICE VISIT (OUTPATIENT)
Dept: FAMILY MEDICINE CLINIC | Facility: CLINIC | Age: 67
End: 2022-04-20
Payer: MEDICARE

## 2022-04-20 ENCOUNTER — APPOINTMENT (OUTPATIENT)
Dept: LAB | Facility: HOSPITAL | Age: 67
End: 2022-04-20
Payer: MEDICARE

## 2022-04-20 VITALS
OXYGEN SATURATION: 99 % | SYSTOLIC BLOOD PRESSURE: 180 MMHG | DIASTOLIC BLOOD PRESSURE: 100 MMHG | HEART RATE: 88 BPM | BODY MASS INDEX: 27.16 KG/M2 | WEIGHT: 163 LBS | RESPIRATION RATE: 14 BRPM | HEIGHT: 65 IN | TEMPERATURE: 98.7 F

## 2022-04-20 VITALS
WEIGHT: 163.6 LBS | BODY MASS INDEX: 27.26 KG/M2 | OXYGEN SATURATION: 98 % | TEMPERATURE: 97.9 F | DIASTOLIC BLOOD PRESSURE: 106 MMHG | SYSTOLIC BLOOD PRESSURE: 182 MMHG | HEIGHT: 65 IN | HEART RATE: 83 BPM

## 2022-04-20 DIAGNOSIS — Z17.0 MALIGNANT NEOPLASM OF LOWER-OUTER QUADRANT OF RIGHT BREAST OF FEMALE, ESTROGEN RECEPTOR POSITIVE (HCC): ICD-10-CM

## 2022-04-20 DIAGNOSIS — Z80.49 FAMILY HISTORY OF MALIGNANT NEOPLASM OF GENITAL ORGAN, UNSPECIFIED: ICD-10-CM

## 2022-04-20 DIAGNOSIS — Z80.3 FAMILY HISTORY OF MALIGNANT NEOPLASM OF BREAST: ICD-10-CM

## 2022-04-20 DIAGNOSIS — I10 PRIMARY HYPERTENSION: Primary | ICD-10-CM

## 2022-04-20 DIAGNOSIS — C50.511 MALIGNANT NEOPLASM OF LOWER-OUTER QUADRANT OF RIGHT FEMALE BREAST, UNSPECIFIED ESTROGEN RECEPTOR STATUS (HCC): ICD-10-CM

## 2022-04-20 DIAGNOSIS — C50.511 MALIGNANT NEOPLASM OF LOWER-OUTER QUADRANT OF RIGHT BREAST OF FEMALE, ESTROGEN RECEPTOR POSITIVE (HCC): ICD-10-CM

## 2022-04-20 DIAGNOSIS — Z17.0 MALIGNANT NEOPLASM OF LOWER-OUTER QUADRANT OF RIGHT BREAST OF FEMALE, ESTROGEN RECEPTOR POSITIVE (HCC): Primary | ICD-10-CM

## 2022-04-20 DIAGNOSIS — Z01.818 PRE-OP EXAMINATION: ICD-10-CM

## 2022-04-20 DIAGNOSIS — C50.511 MALIGNANT NEOPLASM OF LOWER-OUTER QUADRANT OF RIGHT BREAST OF FEMALE, ESTROGEN RECEPTOR POSITIVE (HCC): Primary | ICD-10-CM

## 2022-04-20 DIAGNOSIS — Z13.31 POSITIVE DEPRESSION SCREENING: ICD-10-CM

## 2022-04-20 LAB
ALBUMIN SERPL BCP-MCNC: 4.2 G/DL (ref 3.5–5)
ALP SERPL-CCNC: 86 U/L (ref 46–116)
ALT SERPL W P-5'-P-CCNC: 32 U/L (ref 12–78)
ANION GAP SERPL CALCULATED.3IONS-SCNC: 9 MMOL/L (ref 4–13)
AST SERPL W P-5'-P-CCNC: 17 U/L (ref 5–45)
BASOPHILS # BLD AUTO: 0.02 THOUSANDS/ΜL (ref 0–0.1)
BASOPHILS NFR BLD AUTO: 0 % (ref 0–1)
BILIRUB SERPL-MCNC: 0.36 MG/DL (ref 0.2–1)
BUN SERPL-MCNC: 12 MG/DL (ref 5–25)
CALCIUM SERPL-MCNC: 9.8 MG/DL (ref 8.3–10.1)
CHLORIDE SERPL-SCNC: 103 MMOL/L (ref 100–108)
CO2 SERPL-SCNC: 27 MMOL/L (ref 21–32)
CREAT SERPL-MCNC: 0.6 MG/DL (ref 0.6–1.3)
EOSINOPHIL # BLD AUTO: 0.05 THOUSAND/ΜL (ref 0–0.61)
EOSINOPHIL NFR BLD AUTO: 1 % (ref 0–6)
ERYTHROCYTE [DISTWIDTH] IN BLOOD BY AUTOMATED COUNT: 12.3 % (ref 11.6–15.1)
GFR SERPL CREATININE-BSD FRML MDRD: 95 ML/MIN/1.73SQ M
GLUCOSE P FAST SERPL-MCNC: 112 MG/DL (ref 65–99)
HCT VFR BLD AUTO: 45.5 % (ref 34.8–46.1)
HGB BLD-MCNC: 15.1 G/DL (ref 11.5–15.4)
IMM GRANULOCYTES # BLD AUTO: 0.01 THOUSAND/UL (ref 0–0.2)
IMM GRANULOCYTES NFR BLD AUTO: 0 % (ref 0–2)
LYMPHOCYTES # BLD AUTO: 1.31 THOUSANDS/ΜL (ref 0.6–4.47)
LYMPHOCYTES NFR BLD AUTO: 21 % (ref 14–44)
MCH RBC QN AUTO: 30.8 PG (ref 26.8–34.3)
MCHC RBC AUTO-ENTMCNC: 33.2 G/DL (ref 31.4–37.4)
MCV RBC AUTO: 93 FL (ref 82–98)
MISCELLANEOUS LAB TEST RESULT: NORMAL
MONOCYTES # BLD AUTO: 0.39 THOUSAND/ΜL (ref 0.17–1.22)
MONOCYTES NFR BLD AUTO: 6 % (ref 4–12)
NEUTROPHILS # BLD AUTO: 4.35 THOUSANDS/ΜL (ref 1.85–7.62)
NEUTS SEG NFR BLD AUTO: 72 % (ref 43–75)
NRBC BLD AUTO-RTO: 0 /100 WBCS
PLATELET # BLD AUTO: 285 THOUSANDS/UL (ref 149–390)
PMV BLD AUTO: 9.7 FL (ref 8.9–12.7)
POTASSIUM SERPL-SCNC: 3.8 MMOL/L (ref 3.5–5.3)
PROT SERPL-MCNC: 8 G/DL (ref 6.4–8.2)
RBC # BLD AUTO: 4.9 MILLION/UL (ref 3.81–5.12)
SODIUM SERPL-SCNC: 139 MMOL/L (ref 136–145)
WBC # BLD AUTO: 6.13 THOUSAND/UL (ref 4.31–10.16)

## 2022-04-20 PROCEDURE — 99214 OFFICE O/P EST MOD 30 MIN: CPT | Performed by: FAMILY MEDICINE

## 2022-04-20 PROCEDURE — 85025 COMPLETE CBC W/AUTO DIFF WBC: CPT

## 2022-04-20 PROCEDURE — 99205 OFFICE O/P NEW HI 60 MIN: CPT | Performed by: SURGERY

## 2022-04-20 PROCEDURE — 80053 COMPREHEN METABOLIC PANEL: CPT

## 2022-04-20 PROCEDURE — 71046 X-RAY EXAM CHEST 2 VIEWS: CPT

## 2022-04-20 PROCEDURE — 36415 COLL VENOUS BLD VENIPUNCTURE: CPT

## 2022-04-20 RX ORDER — CEFAZOLIN SODIUM 1 G/50ML
1000 SOLUTION INTRAVENOUS ONCE
Status: CANCELLED | OUTPATIENT
Start: 2022-04-20 | End: 2022-04-20

## 2022-04-20 RX ORDER — LISINOPRIL 20 MG/1
20 TABLET ORAL DAILY
Qty: 90 TABLET | Refills: 1 | Status: SHIPPED | OUTPATIENT
Start: 2022-04-20 | End: 2022-04-29

## 2022-04-20 RX ORDER — ACETAMINOPHEN AND CODEINE PHOSPHATE 300; 30 MG/1; MG/1
1 TABLET ORAL EVERY 6 HOURS PRN
Qty: 20 TABLET | Refills: 0 | Status: SHIPPED | OUTPATIENT
Start: 2022-04-20 | End: 2022-05-18

## 2022-04-20 RX ORDER — NALOXONE HYDROCHLORIDE 4 MG/.1ML
SPRAY NASAL
Qty: 1 EACH | Refills: 1 | Status: SHIPPED | OUTPATIENT
Start: 2022-04-20 | End: 2022-05-18

## 2022-04-20 NOTE — PATIENT INSTRUCTIONS
Depression   AMBULATORY CARE:   Depression  is a medical condition that causes feelings of sadness or hopelessness that do not go away  Depression may cause you to lose interest in things you used to enjoy  These feelings may interfere with your daily life  Common symptoms include the following:   · Appetite changes, or weight gain or loss    · Trouble going to sleep or staying asleep, or sleeping too much    · Fatigue or lack of energy    · Feeling restless, irritable, or withdrawn    · Feeling worthless, hopeless, discouraged, or guilty    · Trouble concentrating, remembering things, doing daily tasks, or making decisions    · Thoughts about hurting or killing yourself    Call your local emergency number (911 in the [address] [facility]) if:   · You think about harming yourself or someone else  · You have done something on purpose to hurt yourself  Call your therapist or doctor if:   · Your symptoms do not improve  · You cannot make it to your next appointment  · You have new symptoms  · You have questions or concerns about your condition or care  The following resources are available at any time to help you, if needed:   · [address] [place] [address]: 5-262.825.5519 (3-609-451-CJGN)     · Suicide Hotline: 9-816.713.2253 (9-842-DDETJVN)     · For a list of international numbers: https://save org/find-help/international-resources/    Treatment for depression  may include medicine to relieve depression  Medicine is often used together with therapy  Therapy is a way for you to talk about your feelings and anything that may be causing depression  Therapy can be done alone or in a group  It may also be done with family members or a significant other  Self-care:   · Get regular physical activity  Try to be active for 30 minutes, 3 to 5 days a week  Physical activity can help relieve depression  Work with your healthcare provider to develop a plan that you enjoy   It may help to ask someone to be active with you  · Create a regular sleep schedule  A routine can help you relax before bed  Listen to music, read, or do yoga  Try to go to bed and wake up at the same time every day  Sleep is important for emotional health  · Eat a variety of healthy foods  Healthy foods include fruits, vegetables, whole-grain breads, low-fat dairy products, lean meats, fish, and cooked beans  A healthy meal plan is low in fat, salt, and added sugar  · Do not drink alcohol or use drugs  Alcohol and drugs can make depression worse  Talk to your therapist or doctor if you need help quitting  Follow up with your healthcare provider as directed: Your healthcare provider will monitor your progress at follow-up visits  He or she will also monitor your medicine if you take antidepressants  Your healthcare provider will ask if the medicine is helping  Tell him or her about any side effects or problems you may have with your medicine  The type or amount of medicine may need to be changed  Write down your questions so you remember to ask them during your visits  © Copyright [employer] 2022 Information is for End User's use only and may not be sold, redistributed or otherwise used for commercial purposes  All illustrations and images included in CareNotes® are the copyrighted property of A D A M , Inc  or Sakshi Bearden   The above information is an  only  It is not intended as medical advice for individual conditions or treatments  Talk to your doctor, nurse or pharmacist before following any medical regimen to see if it is safe and effective for you

## 2022-04-20 NOTE — PROGRESS NOTES
Surgical Oncology Consult Note       [facility]  [facility] [address] [facility] [place] [address]    Sakina Monte  1955  [id number]      Chief Complaint   Patient presents with    Consult     right dcis         Assessment/Plan    1  Malignant neoplasm of lower-outer quadrant of right breast of female, estrogen receptor positive ([facility] )  -     Ambulatory Referral to Oncology Social Worker; Future    2  Pre-op examination         Oncology History   Malignant neoplasm of lower-outer quadrant of right breast of female, estrogen receptor positive ([facility] [address] )   4/12/2022 Initial Diagnosis    Malignant neoplasm of lower-outer quadrant of right breast of female, estrogen receptor positive ([facility] [address] )     4/12/2022 Biopsy    Right breast ultrasound guided biopsy  A  8 o'clock, 3 cm from nipple  Invasive mammary carcinoma of no special type (ductal, not otherwise specified)     TN 0  HER2 1+    B  6 o'clock, 5 cm from nipple   Benign breast tissue, no carcinoma identified    Concordant  Malignancy appears unifocal  Mass measures 0 9 cm on ultrasound  Ultrasound of Right axilla shows no suspicious adenopathy  Left breast has no suspicious findings  Cece  clip placed  Pleasant 73-year-old female who has not had mammogram for several years  However she has been undergoing ultrasound by her friend in [place]  She had an ultrasound of right breast and demonstrated less than a cm mass at 8 o'clock position 3 cm from the nipple this was biopsied and consistent as invasive mammary  ER positive TN negative HER2 negative  %  She denies of any breast pain nipple discharge nipple retraction or skin changes she  She has family history of breast cancer  Review of Systems   Constitutional: Negative for chills and fever  HENT: Negative for ear pain and sore throat  Eyes: Negative for pain and visual disturbance     Respiratory: Negative for cough and shortness of breath  Cardiovascular: Negative for chest pain and palpitations  Gastrointestinal: Negative for abdominal pain and vomiting  Genitourinary: Negative for dysuria and hematuria  Musculoskeletal: Negative for arthralgias and back pain  Skin: Negative for color change and rash  Neurological: Negative for seizures and syncope  All other systems reviewed and are negative  Past Medical History:      Patient Active Problem List   Diagnosis    Hyperlipidemia    Impaired fasting glucose    Malignant neoplasm of lower-outer quadrant of right breast of female, estrogen receptor positive ([facility] [address] )        Past Medical History:   Diagnosis Date    Hypertension         Past Surgical History:   Procedure Laterality Date    APPENDECTOMY      BREAST BIOPSY      benign    US BREAST SHAILESH  NEEDLE LOC RIGHT Right 4/12/2022    US GUIDANCE BREAST BIOPSY RIGHT EACH ADDITIONAL Right 4/12/2022    US GUIDED BREAST BIOPSY RIGHT COMPLETE Right 4/12/2022        Family History   Problem Relation Age of Onset    Breast cancer Mother     Heart disease Mother     Heart disease Father     No Known Problems Sister     No Known Problems Brother     No Known Problems Brother     No Known Problems Brother     Cancer Maternal Grandmother 80        either liver or pancreatic cancer not sure    Arrhythmia Son     No Known Problems Maternal Aunt     Uterine cancer Paternal Aunt 50    Breast cancer Paternal Aunt 52        maybe not sure        Social History     Socioeconomic History    Marital status: /Civil Union     Spouse name: Not on file    Number of children: Not on file    Years of education: Not on file    Highest education level: Not on file   Occupational History    Not on file   Tobacco Use    Smoking status: Never Smoker    Smokeless tobacco: Never Used   Vaping Use    Vaping Use: Never used   Substance and Sexual Activity    Alcohol use:  Yes Alcohol/week: 10 0 standard drinks     Types: 10 Glasses of wine per week    Drug use: Never    Sexual activity: Yes     Partners: Male   Other Topics Concern    Not on file   Social History Narrative    Not on file     Social Determinants of Health     Financial Resource Strain: Not on file   Food Insecurity: Not on file   Transportation Needs: Not on file   Physical Activity: Not on file   Stress: Not on file   Social Connections: Not on file   Intimate Partner Violence: Not on file   Housing Stability: Not on file        Current Outpatient Medications:     Digestive Enzymes (DIGESTIVE ENZYME PO), Take by mouth 3 (three) times a day Taken after meals, Disp: , Rfl:     lisinopril (ZESTRIL) 10 mg tablet, Take 1 tablet (10 mg total) by mouth daily, Disp: 30 tablet, Rfl: 2    NON FORMULARY, Kyolic, Disp: , Rfl:     Probiotic Product (PROBIOTIC PO), Take by mouth in the morning, Disp: , Rfl:     atorvastatin (LIPITOR) 10 mg tablet, Take 1 tablet (10 mg total) by mouth daily (Patient not taking: Reported on 4/20/2022 ), Disp: 90 tablet, Rfl: 1    hydrochlorothiazide (HYDRODIURIL) 12 5 mg tablet, Take 1 tablet (12 5 mg total) by mouth daily (Patient not taking: Reported on 4/20/2022 ), Disp: 30 tablet, Rfl: 0    LORazepam (ATIVAN) 0 5 mg tablet, Take 1 tablet (0 5 mg total) by mouth once for 1 dose, Disp: 1 tablet, Rfl: 0   No Known Allergies    Physical Exam:     Vitals:    04/20/22 1024   BP: (!) 180/100   Pulse: 88   Resp: 14   Temp: 98 7 °F (37 1 °C)   SpO2: 99%     Physical Exam  Constitutional:       Appearance: Normal appearance  HENT:      Head: Normocephalic and atraumatic  Nose: Nose normal       Mouth/Throat:      Mouth: Mucous membranes are moist    Eyes:      Pupils: Pupils are equal, round, and reactive to light  Cardiovascular:      Rate and Rhythm: Normal rate  Pulses: Normal pulses  Heart sounds: Normal heart sounds     Pulmonary:      Effort: Pulmonary effort is normal  Breath sounds: Normal breath sounds  Chest:      Comments: The bilateral Breast(s) were examined  There was not any sign of an inverted nipple, mass, nipple discharge, skin changes or tenderness  The bilateral  breast(s) were examined in the sitting and supine position  There are not any worrisome skin changes, tenderness, nipple changes, swelling ,bleeding or evidence of mass/s in all four quadrants  Tonia survey demonstrated that there is not any evidence of any clinically suspicious axillary, pectoral or supraclavicular lymph nodes  Biopsy site has a small hematoma and clinically resolving  Abdominal:      General: Bowel sounds are normal       Palpations: Abdomen is soft  Musculoskeletal:         General: Normal range of motion  Cervical back: Normal range of motion and neck supple  Skin:     General: Skin is warm  Neurological:      General: No focal deficit present  Mental Status: She is alert and oriented to person, place, and time  Psychiatric:         Mood and Affect: Mood normal          Behavior: Behavior normal          Thought Content: Thought content normal          Judgment: Judgment normal          Results:   Right breast, 8:00 position, 3 cm from nipple (core needle biopsy):  - Invasive mammary carcinoma of no special type (ductal, not otherwise specified)  - Betty grade: Score 6 (of 9), grade 2  * Tubule formation: Score 3 (of 3)  * Nuclear pleomorphism: Score 2 (of 3)  * Mitoses: Score 1 (of 3)  - Invasive carcinoma involves three (3) of three (3) submitted cores [7mm greatest dimension]  - Ductal carcinoma in-situ (DCIS): Absent  - Microcalcifications: Absent  % DE negative HER2 negative  At the time of biopsy SHAILESH  has been placed at 8:00 a m        At 6:00 a m  with heart clip biopsy site is benign breast tissue  Discussion/Summary:    This is a very pleasant 43-year-old female with recent diagnosis of right breast cancer at 8:00 a m  from 3 cm from nipple invasive mammary carcinoma ductal type new Betty grade 2  ER positive HER2 negative and NH negative  We did discuss her genetic options and we will order for genetic testing  After extensive discussions she will undergo right breast lumpectomy with dual tracer technique sentinel lymph node biopsy followed by radiation  We will obtain MammaPrint to delineate any benefits of adjuvant chemotherapy  We did discuss with her based on information we have she may benefit from endocrine therapy  She understand all these plans may change based on final pathology  We did discuss in detail the is surgery benefit procedure alternatives possible complications  All patient's questions were answered to her satisfaction we will arrange surgery  Advance Care Planning/Advance Directives: Polina Maier MD discussed the disease status, and treatment plans with [facility] today 04/20/22 and will follow-up with the patient

## 2022-04-20 NOTE — TELEPHONE ENCOUNTER
CALL RETURN FORM   Reason for patient call? Labs at [facility] calling with questions about surgical procedure scheduled    Patient's primary oncologist?  Dr Ghada Livingston   Name of person the patient was calling for? Dr Ghada Livingston    Any additional information to add, if applicable? Please call [phone], patient is waiting   Informed patient that the message will be forwarded to the team and someone will get back to them as soon as possible    Did you relay this information to the patient?   Yes

## 2022-04-20 NOTE — PROGRESS NOTES
Assessment/Plan:    No problem-specific Assessment & Plan notes found for this encounter  Diagnoses and all orders for this visit:    Primary hypertension  BP elevated, patient resistant to starting additional medication of large increase, Willing to increase to 20 mg of Lisinopril  Discussed the risks of BP remaining this high, patient voiced understanding  She will continue with garlic supplement  She will check BP at home and follow up in office in 4 weeks  -     lisinopril (ZESTRIL) 20 mg tablet; Take 1 tablet (20 mg total) by mouth daily  -     VAS renal artery complete; Future    Malignant neoplasm of lower-outer quadrant of right breast of female, estrogen receptor positive ([facility] [address] )  Following with Sarina, planning for lumpectomy and radiation per patient  Depression Screening and Follow-up Plan: Patient's depression screening was positive with a PHQ-2 score of 4  Their PHQ-9 score was 10  Patient assessed for underlying major depression  Brief counseling provided and recommend additional follow-up/re-evaluation next office visit  Subjective:      Patient ID: Jacquelin Zacarias is a 73 y o  female  HPI    Hypertension  Patient presents for follow-up of hypertension  Home blood pressure readings: not doing  Patient is exercising and is adherent to low salt diet  Use of agents associated with hypertension include none  States that this has been going on for 20 years since her son passed away  Has been taking her Lisinopril 10 mg daily  She is not taking the HCTZ  She is taking a garlic supplement  Notes that she is willing to increase Lisinopril       Symptoms  [] Chest Pain  [] Dyspnea  [] Orthopnea  [] Blurred Vision  [] Palpitations  [] Headache  [] Peripheral Edema  [] Fatigue End Organ Damage  [] Hx of MI  [] Hx of Stroke  [] Hx of TIA  [] Heart Failure  [] LVH  [] CKD  [] PAD  [] Retinopathy Last BP's  BP Readings from Last 3 Encounters:   04/20/22 (!) 182/106   04/20/22 (!) 180/100 04/12/22 (!) 219/109        Recent diagnosis of breast cancer  Notes that she feeling a lot of her BP elevation is due to this diagnosis  States that she is overwhelmed and stressed out  The following portions of the patient's history were reviewed and updated as appropriate: allergies, current medications, past family history, past medical history, past social history, past surgical history and problem list     Review of Systems   Constitutional: Negative for activity change, appetite change, fatigue and fever  HENT: Negative for congestion, ear pain, rhinorrhea and sore throat  Eyes: Negative for pain  Respiratory: Negative for cough and shortness of breath  Cardiovascular: Negative for chest pain and leg swelling  Gastrointestinal: Negative for abdominal pain, constipation, diarrhea, nausea and vomiting  Genitourinary: Negative for dysuria, frequency and urgency  Musculoskeletal: Negative for gait problem  Skin: Negative for rash  Neurological: Negative for dizziness, light-headedness and headaches  Objective:  BP (!) 182/106 (BP Location: Left arm, Patient Position: Sitting, Cuff Size: Adult)   Pulse 83   Temp 97 9 °F (36 6 °C)   Ht 5' 5" (1 651 m)   Wt 74 2 kg (163 lb 9 6 oz)   SpO2 98%   BMI 27 22 kg/m²      Physical Exam  Vitals reviewed  Constitutional:       General: She is not in acute distress  Appearance: Normal appearance  HENT:      Head: Normocephalic and atraumatic  Right Ear: External ear normal       Left Ear: External ear normal       Nose: Nose normal       Mouth/Throat:      Mouth: Mucous membranes are moist    Eyes:      Extraocular Movements: Extraocular movements intact  Conjunctiva/sclera: Conjunctivae normal    Cardiovascular:      Rate and Rhythm: Normal rate and regular rhythm  Heart sounds: Normal heart sounds  Pulmonary:      Effort: Pulmonary effort is normal       Breath sounds: Normal breath sounds   No wheezing, rhonchi or rales  Abdominal:      General: Bowel sounds are normal  There is no distension  Palpations: Abdomen is soft  There is no mass  Tenderness: There is no abdominal tenderness  Musculoskeletal:      Right lower leg: No edema  Left lower leg: No edema  Skin:     General: Skin is warm  Capillary Refill: Capillary refill takes less than 2 seconds  Neurological:      Mental Status: She is alert  Mental status is at baseline  Armaan Fu DO  [facility]  4/20/2022 3:13 PM        Depression Screening Follow-up Plan: Patient's depression screening was positive with a PHQ-2 score of 4  Their PHQ-9 score was 10  Patient assessed for underlying major depression  They have no active suicidal ideations  Brief counseling provided and recommend additional follow-up/re-evaluation next office visit

## 2022-04-20 NOTE — H&P (VIEW-ONLY)
Surgical Oncology Consult Note       [facility]  [facility] [address]  [facility] [place] [address]    Natalie Erik  1955  [id number]      Chief Complaint   Patient presents with    Consult     right dcis         Assessment/Plan    1  Malignant neoplasm of lower-outer quadrant of right breast of female, estrogen receptor positive ([facility] [address] )  -     Ambulatory Referral to Oncology Social Worker; Future    2  Pre-op examination         Oncology History   Malignant neoplasm of lower-outer quadrant of right breast of female, estrogen receptor positive ([facility] [address] )   4/12/2022 Initial Diagnosis    Malignant neoplasm of lower-outer quadrant of right breast of female, estrogen receptor positive ([facility] [address] )     4/12/2022 Biopsy    Right breast ultrasound guided biopsy  A  8 o'clock, 3 cm from nipple  Invasive mammary carcinoma of no special type (ductal, not otherwise specified)     SC 0  HER2 1+    B  6 o'clock, 5 cm from nipple   Benign breast tissue, no carcinoma identified    Concordant  Malignancy appears unifocal  Mass measures 0 9 cm on ultrasound  Ultrasound of Right axilla shows no suspicious adenopathy  Left breast has no suspicious findings  Cece  clip placed  Pleasant 55-year-old female who has not had mammogram for several years  However she has been undergoing ultrasound by her friend in [place]  She had an ultrasound of right breast and demonstrated less than a cm mass at 8 o'clock position 3 cm from the nipple this was biopsied and consistent as invasive mammary  ER positive SC negative HER2 negative  %  She denies of any breast pain nipple discharge nipple retraction or skin changes she  She has family history of breast cancer  Review of Systems   Constitutional: Negative for chills and fever  HENT: Negative for ear pain and sore throat  Eyes: Negative for pain and visual disturbance     Respiratory: Negative for cough and shortness of breath  Cardiovascular: Negative for chest pain and palpitations  Gastrointestinal: Negative for abdominal pain and vomiting  Genitourinary: Negative for dysuria and hematuria  Musculoskeletal: Negative for arthralgias and back pain  Skin: Negative for color change and rash  Neurological: Negative for seizures and syncope  All other systems reviewed and are negative  Past Medical History:      Patient Active Problem List   Diagnosis    Hyperlipidemia    Impaired fasting glucose    Malignant neoplasm of lower-outer quadrant of right breast of female, estrogen receptor positive ([facility] [address] )        Past Medical History:   Diagnosis Date    Hypertension         Past Surgical History:   Procedure Laterality Date    APPENDECTOMY      BREAST BIOPSY      benign    US BREAST SHAILESH  NEEDLE LOC RIGHT Right 4/12/2022    US GUIDANCE BREAST BIOPSY RIGHT EACH ADDITIONAL Right 4/12/2022    US GUIDED BREAST BIOPSY RIGHT COMPLETE Right 4/12/2022        Family History   Problem Relation Age of Onset    Breast cancer Mother     Heart disease Mother     Heart disease Father     No Known Problems Sister     No Known Problems Brother     No Known Problems Brother     No Known Problems Brother     Cancer Maternal Grandmother 80        either liver or pancreatic cancer not sure    Arrhythmia Son     No Known Problems Maternal Aunt     Uterine cancer Paternal Aunt 50    Breast cancer Paternal Aunt 52        maybe not sure        Social History     Socioeconomic History    Marital status: /Civil Union     Spouse name: Not on file    Number of children: Not on file    Years of education: Not on file    Highest education level: Not on file   Occupational History    Not on file   Tobacco Use    Smoking status: Never Smoker    Smokeless tobacco: Never Used   Vaping Use    Vaping Use: Never used   Substance and Sexual Activity    Alcohol use:  Yes Alcohol/week: 10 0 standard drinks     Types: 10 Glasses of wine per week    Drug use: Never    Sexual activity: Yes     Partners: Male   Other Topics Concern    Not on file   Social History Narrative    Not on file     Social Determinants of Health     Financial Resource Strain: Not on file   Food Insecurity: Not on file   Transportation Needs: Not on file   Physical Activity: Not on file   Stress: Not on file   Social Connections: Not on file   Intimate Partner Violence: Not on file   Housing Stability: Not on file        Current Outpatient Medications:     Digestive Enzymes (DIGESTIVE ENZYME PO), Take by mouth 3 (three) times a day Taken after meals, Disp: , Rfl:     lisinopril (ZESTRIL) 10 mg tablet, Take 1 tablet (10 mg total) by mouth daily, Disp: 30 tablet, Rfl: 2    NON FORMULARY, Kyolic, Disp: , Rfl:     Probiotic Product (PROBIOTIC PO), Take by mouth in the morning, Disp: , Rfl:     atorvastatin (LIPITOR) 10 mg tablet, Take 1 tablet (10 mg total) by mouth daily (Patient not taking: Reported on 4/20/2022 ), Disp: 90 tablet, Rfl: 1    hydrochlorothiazide (HYDRODIURIL) 12 5 mg tablet, Take 1 tablet (12 5 mg total) by mouth daily (Patient not taking: Reported on 4/20/2022 ), Disp: 30 tablet, Rfl: 0    LORazepam (ATIVAN) 0 5 mg tablet, Take 1 tablet (0 5 mg total) by mouth once for 1 dose, Disp: 1 tablet, Rfl: 0   No Known Allergies    Physical Exam:     Vitals:    04/20/22 1024   BP: (!) 180/100   Pulse: 88   Resp: 14   Temp: 98 7 °F (37 1 °C)   SpO2: 99%     Physical Exam  Constitutional:       Appearance: Normal appearance  HENT:      Head: Normocephalic and atraumatic  Nose: Nose normal       Mouth/Throat:      Mouth: Mucous membranes are moist    Eyes:      Pupils: Pupils are equal, round, and reactive to light  Cardiovascular:      Rate and Rhythm: Normal rate  Pulses: Normal pulses  Heart sounds: Normal heart sounds     Pulmonary:      Effort: Pulmonary effort is normal  Breath sounds: Normal breath sounds  Chest:      Comments: The bilateral Breast(s) were examined  There was not any sign of an inverted nipple, mass, nipple discharge, skin changes or tenderness  The bilateral  breast(s) were examined in the sitting and supine position  There are not any worrisome skin changes, tenderness, nipple changes, swelling ,bleeding or evidence of mass/s in all four quadrants  Tonia survey demonstrated that there is not any evidence of any clinically suspicious axillary, pectoral or supraclavicular lymph nodes  Biopsy site has a small hematoma and clinically resolving  Abdominal:      General: Bowel sounds are normal       Palpations: Abdomen is soft  Musculoskeletal:         General: Normal range of motion  Cervical back: Normal range of motion and neck supple  Skin:     General: Skin is warm  Neurological:      General: No focal deficit present  Mental Status: She is alert and oriented to person, place, and time  Psychiatric:         Mood and Affect: Mood normal          Behavior: Behavior normal          Thought Content: Thought content normal          Judgment: Judgment normal          Results:   Right breast, 8:00 position, 3 cm from nipple (core needle biopsy):  - Invasive mammary carcinoma of no special type (ductal, not otherwise specified)  - Betty grade: Score 6 (of 9), grade 2  * Tubule formation: Score 3 (of 3)  * Nuclear pleomorphism: Score 2 (of 3)  * Mitoses: Score 1 (of 3)  - Invasive carcinoma involves three (3) of three (3) submitted cores [7mm greatest dimension]  - Ductal carcinoma in-situ (DCIS): Absent  - Microcalcifications: Absent  % MI negative HER2 negative  At the time of biopsy SHAILESH  has been placed at 8:00 a m        At 6:00 a m  with heart clip biopsy site is benign breast tissue  Discussion/Summary:    This is a very pleasant 71-year-old female with recent diagnosis of right breast cancer at 8:00 a m  from 3 cm from nipple invasive mammary carcinoma ductal type new Betty grade 2  ER positive HER2 negative and AR negative  We did discuss her genetic options and we will order for genetic testing  After extensive discussions she will undergo right breast lumpectomy with dual tracer technique sentinel lymph node biopsy followed by radiation  We will obtain MammaPrint to delineate any benefits of adjuvant chemotherapy  We did discuss with her based on information we have she may benefit from endocrine therapy  She understand all these plans may change based on final pathology  We did discuss in detail the is surgery benefit procedure alternatives possible complications  All patient's questions were answered to her satisfaction we will arrange surgery  Advance Care Planning/Advance Directives: Lucia Cisneros MD discussed the disease status, and treatment plans with [facility] today 04/20/22 and will follow-up with the patient

## 2022-04-21 ENCOUNTER — PATIENT OUTREACH (OUTPATIENT)
Dept: CASE MANAGEMENT | Facility: HOSPITAL | Age: 67
End: 2022-04-21

## 2022-04-21 ENCOUNTER — PATIENT OUTREACH (OUTPATIENT)
Dept: HEMATOLOGY ONCOLOGY | Facility: CLINIC | Age: 67
End: 2022-04-21

## 2022-04-21 DIAGNOSIS — Z17.0 MALIGNANT NEOPLASM OF LOWER-OUTER QUADRANT OF RIGHT BREAST OF FEMALE, ESTROGEN RECEPTOR POSITIVE (HCC): Primary | ICD-10-CM

## 2022-04-21 DIAGNOSIS — C50.511 MALIGNANT NEOPLASM OF LOWER-OUTER QUADRANT OF RIGHT BREAST OF FEMALE, ESTROGEN RECEPTOR POSITIVE (HCC): Primary | ICD-10-CM

## 2022-04-21 NOTE — PROGRESS NOTES
Breast Oncology Nurse Navigator    Called patient for initial outreach from nurse navigator  Left voicemail message with contact information  Requested a call back  Patient called back a short time later  Introduced myself and my role  Patient is very concerned about the sentinel lymph node biopsy and "blue dye" used  She is worried about lymphedema  Explained the procedure in detail and the rationale for SLN biopsy vs axillary lymph node dissection  Advised the patient that if she wants to do further research, she should only go to reputable websites such as [facility]  Spoke to her about the [employer] and some of the resources they provide  Patient states she is a "natural person" and does not like having chemicals in her body  Patient had blood work drawn yesterday for genetic testing  Her mother had breast cancer  When we spoke about this, patient stated she has decided she does not want her blood tested for genetics  Asked how she can stop the test from being performed  Does not want to know the outcome and does not want her treatment plan altered  Patient states she does not have any children and does not feel the test is necessary  Will route this message to the oncology genetics team and Dr Larisa Thornton nurse  Patient wants to speak with a cancer counselor  Asked if what she says will be kept confidential   I advised her it will be confidential   Will reach out to [facility]  Made patient aware that someone will be calling her in the next few days  Patient had questions regarding radiation therapy  Radiation explained in detail  Advised that we could discuss this further after surgery is completed and Dr Genaro Castellon refers her to radiation oncology, if necessary  Patient has my contact information and knows she can reach out with further questions

## 2022-04-22 ENCOUNTER — PATIENT OUTREACH (OUTPATIENT)
Dept: HEMATOLOGY ONCOLOGY | Facility: CLINIC | Age: 67
End: 2022-04-22

## 2022-04-22 ENCOUNTER — PATIENT OUTREACH (OUTPATIENT)
Dept: CASE MANAGEMENT | Facility: HOSPITAL | Age: 67
End: 2022-04-22

## 2022-04-22 DIAGNOSIS — Z17.0 MALIGNANT NEOPLASM OF LOWER-OUTER QUADRANT OF RIGHT BREAST OF FEMALE, ESTROGEN RECEPTOR POSITIVE (HCC): Primary | ICD-10-CM

## 2022-04-22 DIAGNOSIS — C50.511 MALIGNANT NEOPLASM OF LOWER-OUTER QUADRANT OF RIGHT BREAST OF FEMALE, ESTROGEN RECEPTOR POSITIVE (HCC): Primary | ICD-10-CM

## 2022-04-22 NOTE — PROGRESS NOTES
Breast Oncology Nurse Navigator    Patient called in asking about the genetic testing  Asking if the test was canceled  I advised her that the test is on hold at this time  Patient states she is pleased that it is on hold  Patient has done research on different types of radiation therapy  Patient asking for options other than external beam RT  Asking why she is not a candidate for other types  Wants as few treatments of RT as possible  Will ask Dr Yamileth Dupont nurse about a referral to radiation oncology upfront to discuss  Advised patient she can also always go for another opinion to see if she can be treated differently  Patient would like to be seen by radiation oncology  Patient states she is a very anxious person and she feels sick over this  Very fearful of the side effects of radiation  "I want to keep it simple "  Also anxious about "walking around with this cancer in my body  I just want it cut out "    Then asking about medical oncology  Asking "what kind of pill do I have to take"? Advised there are different types of oral medications given for estrogen positive breast cancer  Briefly described some of the options, telling her the medical oncologist will choose the appropriate course of treatment  Patient states she has not yet spoken with the , as they are "playing phone tag "  Will route my message to the  as well

## 2022-04-22 NOTE — PROGRESS NOTES
ASHLEE s/w pt by phone this afternoon after a referral from [facility]  Pt tells me that she's been overwhelmed with her diagnosis and says on one hand she was surprised to hear the news, and on the other hand she knew at some point this would likely happen as she has a family history of breast cancer  She was happy with her visit with Dr Symone Han and is looking forward to having her surgery to remove her tumors  She says that she doesn't ultimately want to do radiation but understands that it's in her best interest long term to do it  She says that she never expected to hear she would need treatment 3-4 weeks, 5 days a week, and feels that she can not complete this as it will be too much for her  She has been doing some research on her own and is interested in doing a radiation program that only lasts 5 days  I suggested that we get her scheduled for a consult with Rad Onc to see what her options are and have all of her questions and concerns addressed, as I cannot tell her that we offer the procedure that she's hoping for let alone that she is a candidate for it  She agreed that it would be best for her to come in and meet with a physician to get a plan in place  I let her know that someone would be reaching out to get her scheduled, and she says that she would prefer to come in as soon as possible  She was very appreciative of the time spent talking today, ASHLEE will f/u with pt at or after her radiation appt depending on the date, no other needs or concerns at this time

## 2022-04-25 ENCOUNTER — TELEPHONE (OUTPATIENT)
Dept: FAMILY MEDICINE CLINIC | Facility: CLINIC | Age: 67
End: 2022-04-25

## 2022-04-25 ENCOUNTER — PATIENT OUTREACH (OUTPATIENT)
Dept: HEMATOLOGY ONCOLOGY | Facility: CLINIC | Age: 67
End: 2022-04-25

## 2022-04-25 ENCOUNTER — TELEPHONE (OUTPATIENT)
Dept: RADIATION ONCOLOGY | Facility: CLINIC | Age: 67
End: 2022-04-25

## 2022-04-25 ENCOUNTER — TELEPHONE (OUTPATIENT)
Dept: SURGICAL ONCOLOGY | Facility: CLINIC | Age: 67
End: 2022-04-25

## 2022-04-25 NOTE — TELEPHONE ENCOUNTER
AG Robins is scheduled for a Consult 5/2/22 2:30 PM w/ Dr Michelle Mckeon at the [facility]  Sarah Campa is scheduled for her Lumpectomy 5/5/22  Sarah Campa asked that FD message Nurse Navigator to let them know of the upcoming RAD ONC appointment and that she may need to speak w/ Dr Domingo Elias again  She has signed consent for [place] Node Biopsy however, after research she has changed her mind  Rishi Yeager that [address] would message her Care Team   [facility] [address] Department contact information provided for any questions or concerns prior to 5/2/22    KD

## 2022-04-25 NOTE — PROGRESS NOTES
Breast Oncology Nurse Navigator    Patient called in  Upset about a letter she received in the ail over the weekend about her mammogram from 4/12/22  Advised her that this is a standard letter that is sent out to everyone  There is nothing for her to worry about because she had a biopsy performed and is seeking treatment  She is also upset that she does not have an appointment with radiation oncology  I advised that someone should be calling her but I gave her the phone numbers for radiation oncology in [address] at her insistence  Patient still upset that this "lump wasn't cut out already"  Advised that she had a biopsy and the standard of care is being followed  Patient does not want to hear about "standard of care" and is angry that the tumor was not removed  Tried to offer support but patient hung up

## 2022-04-25 NOTE — TELEPHONE ENCOUNTER
Had a very long discussion with this patient over the phone about surgery and it being a standard of care for the sentinel lymph node biopsy to be preformed  She had many questions regarding why she needed a sentinel lymph node biopsy and asking why she wasn't a candidate for different types of radiation  I explained to her she will need to have an in depth conversation with Dr Dwight Ureña to discuss her options of different radiation treatments  I also set up an appointment with Dr Brenda Nichols to discuss if sentinel lymph node biopsy can be done through one incision  Patient is not interested in having an incision in her armpit  She wants to ensure "cutting this open isn't going to aggravate the cancer and make it spread even more"  I explained the rationale of the sentinel lymph node biopsy but thought it was best to see Dr Brenda Nichols before surgery  Patient appreciative of phone call and all questions answered to the best of my ability at this time

## 2022-04-25 NOTE — TELEPHONE ENCOUNTER
Call placed to patient, home number no answer  Mobile number, not able to leave voicemail at mailbox is full  Seems like the mail she received was sent to ensure follow up after her abnormal mammogram  There is nothing in her chart indicating that things need further workup other than what is already being done  I believe the mailed results were slow to arrive and she already has begun her workup and follow up  If she has further questions, she can call our office or her breast surgeon       RANDEE Molina  4/25/22 8:59 AM

## 2022-04-26 ENCOUNTER — TELEPHONE (OUTPATIENT)
Dept: SURGICAL ONCOLOGY | Facility: CLINIC | Age: 67
End: 2022-04-26

## 2022-04-26 NOTE — TELEPHONE ENCOUNTER
Called patient at request of pat's   Left message with direct office number to discuss results of labwork/ekg/chest xray at patients request

## 2022-04-27 ENCOUNTER — TELEPHONE (OUTPATIENT)
Dept: FAMILY MEDICINE CLINIC | Facility: CLINIC | Age: 67
End: 2022-04-27

## 2022-04-27 NOTE — TELEPHONE ENCOUNTER
I would not suspect the lisinopril to cough the nasal congestion  I would recommend using OTC nasal spray to help with the congestion, it is possible that the PND from her nasal symptoms is causing the cough       DO [facility]  4/27/2022 12:59 PM

## 2022-04-27 NOTE — TELEPHONE ENCOUNTER
T/c from pt - pt has cough/ sounds nasally pt thinks it is from her taking lisinopil because she normally does not take 'drugs' - pt reiterated that she will not be missing her lumpectomy coming up and will not let this affect that

## 2022-04-28 ENCOUNTER — TELEPHONE (OUTPATIENT)
Dept: FAMILY MEDICINE CLINIC | Facility: CLINIC | Age: 67
End: 2022-04-28

## 2022-04-28 DIAGNOSIS — R09.81 NASAL CONGESTION: Primary | ICD-10-CM

## 2022-04-28 RX ORDER — FLUTICASONE PROPIONATE 50 MCG
1 SPRAY, SUSPENSION (ML) NASAL DAILY
Qty: 11.1 ML | Refills: 2 | Status: SHIPPED | OUTPATIENT
Start: 2022-04-28 | End: 2022-06-13

## 2022-04-28 NOTE — TELEPHONE ENCOUNTER
T/c from pt - pt is asking if you can send in a script for flonase? That is the nasal spray she wants to use (t/c from 4/27) but it's expensive and maybe her insurance will cover it with a script  Please advise

## 2022-04-29 ENCOUNTER — ANESTHESIA EVENT (OUTPATIENT)
Dept: PERIOP | Facility: HOSPITAL | Age: 67
End: 2022-04-29
Payer: MEDICARE

## 2022-04-29 RX ORDER — MELATONIN
1000 DAILY
COMMUNITY

## 2022-04-29 RX ORDER — LISINOPRIL 10 MG/1
10 TABLET ORAL DAILY
COMMUNITY
End: 2022-06-13

## 2022-04-29 NOTE — PRE-PROCEDURE INSTRUCTIONS
Pre-Surgery Instructions:   Medication Instructions    cholecalciferol (VITAMIN D3) 1,000 units tablet Stop taking 7 days prior to surgery   Digestive Enzymes (DIGESTIVE ENZYME PO) Stop taking 7 days prior to surgery   fluticasone (FLONASE) 50 mcg/act nasal spray Uses PRN- OK to take day of surgery    lisinopril (ZESTRIL) 10 mg tablet Hold day of surgery   NON FORMULARY Stop taking 7 days prior to surgery   Probiotic Product (PROBIOTIC PO) Stop taking 7 days prior to surgery  Pt denies fever, sob, sore throat and cough  Pt verbalized understanding of shower and med instructions  Pt instructed to stop nsaids and supplements one week prior to surgery

## 2022-05-02 ENCOUNTER — RADIATION ONCOLOGY CONSULT (OUTPATIENT)
Dept: RADIATION ONCOLOGY | Facility: CLINIC | Age: 67
End: 2022-05-02
Attending: RADIOLOGY
Payer: MEDICARE

## 2022-05-02 VITALS
RESPIRATION RATE: 16 BRPM | HEART RATE: 73 BPM | WEIGHT: 162 LBS | BODY MASS INDEX: 26.96 KG/M2 | TEMPERATURE: 98 F | DIASTOLIC BLOOD PRESSURE: 88 MMHG | SYSTOLIC BLOOD PRESSURE: 180 MMHG | OXYGEN SATURATION: 96 %

## 2022-05-02 DIAGNOSIS — C50.511 MALIGNANT NEOPLASM OF LOWER-OUTER QUADRANT OF RIGHT BREAST OF FEMALE, ESTROGEN RECEPTOR POSITIVE (HCC): ICD-10-CM

## 2022-05-02 DIAGNOSIS — Z17.0 MALIGNANT NEOPLASM OF LOWER-OUTER QUADRANT OF RIGHT BREAST OF FEMALE, ESTROGEN RECEPTOR POSITIVE (HCC): Primary | ICD-10-CM

## 2022-05-02 DIAGNOSIS — C50.511 MALIGNANT NEOPLASM OF LOWER-OUTER QUADRANT OF RIGHT BREAST OF FEMALE, ESTROGEN RECEPTOR POSITIVE (HCC): Primary | ICD-10-CM

## 2022-05-02 DIAGNOSIS — Z17.0 MALIGNANT NEOPLASM OF LOWER-OUTER QUADRANT OF RIGHT BREAST OF FEMALE, ESTROGEN RECEPTOR POSITIVE (HCC): ICD-10-CM

## 2022-05-02 PROCEDURE — 99211 OFF/OP EST MAY X REQ PHY/QHP: CPT | Performed by: RADIOLOGY

## 2022-05-02 PROCEDURE — 99203 OFFICE O/P NEW LOW 30 MIN: CPT | Performed by: RADIOLOGY

## 2022-05-02 NOTE — PROGRESS NOTES
Consultation - Radiation Oncology      PAC:[id number] : 1955  Encounter: [id number]  Patient Information: [facility]      CHIEF COMPLAINT  Chief Complaint   Patient presents with    Breast Cancer    Consult          History of Present Illness   [facility] is a 73y o  year old female with family history of mother with breast cancer who was recently diagnosed invasive mammary carcinoma scheduled for breast conservation therapy this Thursday  She wished to discuss treatment and radiation options prior to resection and presents for consultation today  Briefly, the patient was found on screening mammogram with suspicious new findings  2022 Mammo screening bilateral breasts demonstrated new 8 mm oval mass with circumscribed margins in the retroareolar region of the right breast   The left breast was benign (BI-RADS 2)  Further imaging of the right breast was recommended  3/8/22 right breast ultrasound revealed a correlate 9 mm irregularly shaped, hypoechoic mass with angular margins at 8 o'clock position highly suggestive of cancer (BIRADS-5)  Additional oval well-defined nodule at the 6 o'clock position measuring 1 1 cm was identified without mammographic correlate, likely lipoma  Imaging of the axilla demonstrated no suspicious adenopathy        2022 Core biopsy Right breast, 8:00 position  -Grade 2, invasive mammary carcinoma of no special type (ductal, not otherwise specified)  Tumor cells were ER(+), CO (-), HER2 (-)     B  Right breast, 6:00 position, 5 cm from nipple (core needle biopsy): - Benign breast tissue  - No carcinoma identified     2022 Consult [facility] SurgOnc: Plan for lumpectomy with sentinel node biopsy followed by radiation, and obtain mammaprint      2022 Patient outreach/Nurse navigation/Gris Stahl:  Blood work drawn for genetic testing  Patient requesting now not to have tests done  Does not want results   Tests on hold       2022 Patient outreach/Nurse navigation/Gris Jaramillo:  Concerns about side effects of RT and hormone therapy  Very anxious  Willing to proceed with consult at [address] [place] [address] to discuss       2022 Patient outreach/Social work/Lucia Alejandro:  Feels she cannot complete weeks of RT  Doing research and requesting 5 days of RT only  Agreeable to consult to discuss options       2022 Patient outreach/Nurse navigation/Gris Jaramillo:  Patient angry that tumor not removed yet  Upset about mammogram report  Upset that RadOnc consult not yet scheduled  Tony Menongy up on nurse navigator      2022 RadOnc: scheduled for consult   Patient states she has changed her mind about having a sentinel node biopsy done      Currently, the patient complains of anxiety and fear regarding her diagnosis  She denies new palpable nodules, suspicious skin changes, or nipple discharge the breasts bilaterally  She does have resolving ecchymosis in the area of previous biopsy  She denies upper extremity edema or shoulder restriction  She is scheduled for lumpectomy and sentinel lymph nodes biopsy on 22  Upcomin2022 Dr Everardo Kwon follow up to discuss necessity of sentinel node biopsy  Patient has concerns about incisions and cancer spread     2022 Lumpectomy scheduled  2022 Surg Onc post op    Historical Information   Oncology History   Malignant neoplasm of lower-outer quadrant of right breast of female, estrogen receptor positive ([facility] [address] )   2022 Initial Diagnosis    Malignant neoplasm of lower-outer quadrant of right breast of female, estrogen receptor positive ([facility] [address] )     2022 Biopsy    Right breast ultrasound guided biopsy  A  8 o'clock, 3 cm from nipple  Invasive mammary carcinoma of no special type (ductal, not otherwise specified)     N[place] 0  HER2 1+    B  6 o'clock, 5 cm from nipple   Benign breast tissue, no carcinoma identified    Concordant  Malignancy appears unifocal  Mass measures 0 9 cm on ultrasound  Ultrasound of Right axilla shows no suspicious adenopathy  Left breast has no suspicious findings  Cece  clip placed        4/20/2022 Genetic Testing    Invitae ordered  Blood drawn  Patient then refused testing       4/21/2022 Genomic Testing    MammaPrint   Low risk  Luminal type A  MammaPrint index: +0 291            Past Medical History:   Diagnosis Date    Breast cancer ([address] )     Hypertension      Past Surgical History:   Procedure Laterality Date    APPENDECTOMY      BREAST BIOPSY      benign    COLONOSCOPY      US BREAST CECE  NEEDLE LOC RIGHT Right 4/12/2022    US GUIDANCE BREAST BIOPSY RIGHT EACH ADDITIONAL Right 4/12/2022    US GUIDED BREAST BIOPSY RIGHT COMPLETE Right 4/12/2022       Family History   Problem Relation Age of Onset    Breast cancer Mother     Heart disease Mother     Heart disease Father     No Known Problems Sister     No Known Problems Brother     No Known Problems Brother     No Known Problems Brother     Cancer Maternal Grandmother 80        either liver or pancreatic cancer not sure    Arrhythmia Son     No Known Problems Maternal Aunt     Uterine cancer Paternal Aunt 50    Breast cancer Paternal Aunt 46        maybe not sure       Social History   Social History     Substance and Sexual Activity   Alcohol Use Not Currently     Social History     Substance and Sexual Activity   Drug Use Never     Social History     Tobacco Use   Smoking Status Never Smoker   Smokeless Tobacco Never Used         Meds/Allergies     Current Outpatient Medications:     acetaminophen-codeine (TYLENOL #3) 300-30 mg per tablet, Take 1 tablet by mouth every 6 (six) hours as needed for moderate pain, Disp: 20 tablet, Rfl: 0    cholecalciferol (VITAMIN D3) 1,000 units tablet, Take 1,000 Units by mouth daily, Disp: , Rfl:     Digestive Enzymes (DIGESTIVE ENZYME PO), Take by mouth 3 (three) times a day Taken after meals, Disp: , Rfl:     lisinopril (ZESTRIL) 10 mg tablet, Take 10 mg by mouth daily, Disp: , Rfl:     naloxone (NARCAN) 4 mg/0 1 mL nasal spray, Administer 1 spray into a nostril  If no response after 2-3 minutes, give another dose in the other nostril using a new spray , Disp: 1 each, Rfl: 1    NON FORMULARY, Kyolic, Disp: , Rfl:     Probiotic Product (PROBIOTIC PO), Take by mouth in the morning, Disp: , Rfl:     fluticasone (FLONASE) 50 mcg/act nasal spray, 1 spray into each nostril daily (Patient not taking: Reported on 2022 ), Disp: 11 1 mL, Rfl: 2    LORazepam (ATIVAN) 0 5 mg tablet, Take 1 tablet (0 5 mg total) by mouth once for 1 dose, Disp: 1 tablet, Rfl: 0  No Known Allergies      Review of Systems  Constitutional: Negative  HENT: Negative  Eyes: Negative  Respiratory: Negative  Cardiovascular: Negative  Gastrointestinal: Negative  Endocrine: Negative  Genitourinary: Positive for frequency and urgency  Musculoskeletal: Positive for neck pain (intermittent)  Skin: Negative  Allergic/Immunologic: Negative  Neurological: Negative  Hematological: Negative  Psychiatric/Behavioral: Negative for sleep disturbance and suicidal ideas  The patient is nervous/anxious           OB/GYN History:  The patient underwent menarche at 16 years  Menopause Status Pre, Brunilda, Unknown and No Answer  No LMP recorded  Patient is postmenopausal   Menopause at 54} years  Menopause Reason natural  Hormone replacement therapy: no    2   Para 1   Age at first delivery being 31 years  Nursing: no    Birth control pills: yes  Years used 10    OBJECTIVE:   BP (!) 180/88   Pulse 73   Temp 98 °F (36 7 °C)   Resp 16   Wt 73 5 kg (162 lb)   SpO2 96%   BMI 26 96 kg/m²   Performance Status: Karnofsky: 100 - Fully active, able to carry on all pre-disease performed without restriction    Physical Exam  Vitals and nursing note reviewed  Constitutional:       General: She is not in acute distress  Appearance: She is well-developed     Eyes: General: No scleral icterus  Cardiovascular:      Rate and Rhythm: Normal rate and regular rhythm  Heart sounds: No murmur heard  Pulmonary:      Breath sounds: No wheezing, rhonchi or rales  Chest:   Breasts:      Right: No axillary adenopathy or supraclavicular adenopathy  Left: No axillary adenopathy or supraclavicular adenopathy  Comments: Breast examination demonstrates the patient to be symmetric in contour and size  There is a resolving ecchymosis of the 6:00 and 8:00 position of the right breast   There are no palpable nodules or suspicious skin changes of the breasts bilaterally  Abdominal:      General: There is no distension  Tenderness: There is no abdominal tenderness  Musculoskeletal:      Right lower leg: No edema  Left lower leg: No edema  Lymphadenopathy:      Cervical: No cervical adenopathy  Upper Body:      Right upper body: No supraclavicular or axillary adenopathy  Left upper body: No supraclavicular or axillary adenopathy  Neurological:      Mental Status: She is alert and oriented to person, place, and time  Gait: Gait normal          RESULTS  Imaging Studies  XR chest pa & lateral    Result Date: 4/22/2022  Narrative: CHEST INDICATION:   C50 511: Malignant neoplasm of lower-outer quadrant of right female breast Z17 0: Estrogen receptor positive status (ER+)  COMPARISON:  Chest radiograph from 11/21/2019  EXAM PERFORMED/VIEWS:  XR CHEST PA & LATERAL  DUAL ENERGY SUBTRACTION  FINDINGS: Cardiomediastinal silhouette appears unremarkable  The lungs are clear  No pneumothorax or pleural effusion  Osseous structures appear within normal limits for patient age  Impression: No acute cardiopulmonary disease   Workstation performed: [id number]     US guided breast biopsy right complete, US guidance breast biopsy right each additional, US breast shira  right, Mammo post biopsy right    Addendum Date: 4/14/2022 Addendum:   ADDENDUM: PATHOLOGY RESULTS: A) Mass (Right; 8 o'clock; Middle; 3 cm from nipple) Malignant finding: the pathology findings indicate invasive ductal carcinoma  The pathology result is concordant with imaging  B) Mass (Right; 6 o'clock; 5 cm from nipple) Benign finding: the pathology findings indicate benign breast tissue  The pathology is malignant In review of the patients recent imaging, the right malignancy appears unifocal  Ultrasound of the right axilla was performed on March 8, 2022 and showed no suspicious adenopathy  Recent imaging of the contralateral left breast dated February 28, 2022 was reviewed and shows no suspicious findings  RECOMMENDATION:      - Surgical Consultation for the right breast  END ADDENDUM    Result Date: 4/12/2022  Narrative: DIAGNOSIS: Abnormal mammogram INDICATION: Luiza Lott is a 73 y o  female presenting for ultrasound-guided core biopsy of the right breast x 2  FINDINGS: RIGHT A) MASS: Images of the right breast mass at 8 o'clock in the middle portion, 3 cm from the nipple were obtained  The patient was placed supine on the biopsy table  A core needle biopsy was performed under ultrasound guidance  The skin was prepped in the usual fashion  Anesthesia was administered using lidocaine 1%  A 14 G device was advanced in multiple passes  Using the device, 3 samples were collected  A SAVIscout clip was placed and reflector activity confirmed with Scoutcheck  Post-placement digital mammographic imaging demonstrates the clip was at the site  The patient tolerated the procedure well  There were no immediate complications  B) MASS: Images of the right breast mass at 6 o'clock, 5 cm from the nipple were obtained  The patient was placed supine on the biopsy table  A core needle biopsy was performed under ultrasound guidance  The skin was prepped in the usual fashion  Anesthesia was administered using lidocaine 1%  A 14 G device was advanced in 2 passes   Using the device, 2 samples were collected  A clip was inserted into the target area  (heart shaped) Post-placement ultrasound and digital mammographic imaging demonstrate the clip was at the site  The patient tolerated the procedure well  There were no immediate complications  Impression:  Successful ultrasound-guided core biopsy of the right breast x 2 RECOMMENDATION:      - Waiting for pathology for the right breast  Workstation ID: [id number]         Pathology:4/12/2022 09:08     Status: Edited Result - FINAL     Visible to patient: No (inaccessible in [address])     Dx: Abnormal mammogram     0 Result Notes    Component    Case Report   Surgical Pathology Report                         Case: [id number]                                    Authorizing Provider: Ishaan Malhotra DO          Collected:           04/12/2022 0908               Ordering Location:     [facility] Received:            04/12/2022 1348                                      [facility]                                                               Pathologist:           Branden Andersen MD                                                          Specimens:   A) - Breast, Right, US GUID RIGHT BREAST  3cmfn 3passes 12G marquee                            B) - Breast, Right, US GUID RIGHT BREAST  5cmfn 2passes 12G marquee                  Addendum 2   At the request of Dr Tristin Bobo, unstained slides from paraffin BLOCK A1 containing the patient's cancer cells were sent to [facility]  for Breast Cancer Test Suite testing  Upon completion of testing, the [facility]  Laboratory report will be directly sent to the requesting physician as well as posted in the Media Tab of the patient's [employer] EMR by the [facility]  Pathology Department      Please note:  The [employer] analysis and report is performed independently of [facility], and neither the Hospital nor the Pathology Department screen, review or comment upon [facility] report  Because the role of testing in cancer diagnosis and management is subject to evolving development, usage and interpretation, we strongly urge that the test limitations described in the [facility]  [facility] report be carefully read, appropriately shared with the patient, and critically considered when reaching treatment decisions      This pathology material was removed from archive for purpose of molecular testing         Addendum electronically signed by Jimmy Godinez MD on 4/22/2022 at 1145   Addendum   ER, ND, Her2 IHC results      Test Description                        Result               Prognostic Interpretation     Estrogen Receptor/ER                100%                 Positive   Primary Antibody: SP1  Internal control: Positive              Staining Intensity: Strong   External control: Positive             Leandro Score*: 8     Progesterone Receptor/PgR       0%                     Negative              Primary Antibody:1E2  Internal control: Positive                External control: Positive             Leandro Score*:  0        HER2 by IHC                               1+                      Negative   Primary Antibody:4B5  HER2 by FISH                            Not Indicated     A positive control for each antibody has been reviewed and accepted       Fixative: Formalin:   Duration of Fixation (hours): 14 75 hours - Meets specified requirements of latest ASCO/CAP guidelines  Cold Ischemia Time (minutes): Not stated  Sample Adequacy: Adequate     These immunohistochemical tests were performed at [facility] in [place], [place] and interpreted by Dr Nirav Bolivar   An electronic copy of this report will be kept on file in the Medical Records Department at [facility]      * The Leandro score is a semi quantitative system that takes into consideration the proportion of positive cells (scored on a scale of 0-5) and staining intensity (scored on a scale of (0-3)  The proportion and intensity are summed to produce total scores of 0 or 2 through 8  A score of 0-2 is regarded as negative while 3-8 as positive         Addendum electronically signed by Corrina Malhotra MD on 4/15/2022 at 1521   Final Diagnosis   A  Right breast, 8:00 position, 3 cm from nipple (core needle biopsy):  - Invasive mammary carcinoma of no special type (ductal, not otherwise specified)  - Bryant grade: Score 6 (of 9), grade 2        * Tubule formation: Score 3 (of 3)        * Nuclear pleomorphism: Score 2 (of 3)        * Mitoses: Score 1 (of 3)     - Invasive carcinoma involves three (3) of three (3) submitted cores [7mm greatest dimension]  - Ductal carcinoma in-situ (DCIS): Absent   - Microcalcifications: Absent  - Best representative tumor block: A1    -- Sufficient tumor present for        Agendia Mammaprint/Blueprint (1 cm2 of invasive tumor in aggregate): Yes        MI Profile/Foundation One (at least 5 x 5 mm of tumor): Yes     Comment:  - ER, OR, Her2 have been ordered and results will be indicated in an addendum    - The tumor cells stain for CK AE1/3, GATA3, GCDFP15 with membranous Ecadherin and p120 expression   and OR are negative while ER is positive  [facility] and p63 show absent myoepithelial cells  The tumor cells show focal cribriform features  - Nurse navigator [employer] was notified of the diagnosis on 4/14/22 at 3:16 pm       B  Right breast, 6:00 position, 5 cm from nipple (core needle biopsy): - Benign breast tissue  - No carcinoma identified        Interpretation performed at [facility], [address] [facility], [facility], [address] [facility]      Electronically signed by Corrina Malhotra MD on 4/14/2022 at [address] [facility]  1   Malignant neoplasm of lower-outer quadrant of right breast of female, estrogen receptor positive ([facility] [address] )  Ambulatory referral to Radiation Oncology     Cancer Staging  Clinical [id number], grade 2 invasive mammary carcinoma of the right breast      PLAN/DISCUSSION    Chidi Escalante is a 73y o  year old female who was recently diagnosed with clinical stage I right breast carcinoma  Tumor cells are ER(+), AL(-), and HER2(-)  She is scheduled for definitive lumpectomy and sentinel lymph node biopsy this week  We reviewed her pathology and plan of care to date  We discussed her hormone receptor status and the clinical implications of being ER(+)  She states that her mother took tamoxifen and we briefly discussed that she will likely be a candidate for endocrine therapy with aromatase inhibitors  The patient was very concerned regarding sentinel lymph node biopsy and risk of lymphedema as well as tumor seeding  We discussed that for invasive early stage breast cancer the standard of care is to evaluate the axilla for possible metastasis as this is most commonly the first place of spread outside of the breast   I acknowledged that imaging is negative, her primary disease is small, and exam is negative  Microscopic disease, however, can still be harbored in a significant percentage of women and metastasis to the axilla upstages breast cancer and can significantly change treatment management paradigm  Therefore, I strongly recommend that she undergo SLN biopsy with her surgery  We discussed her fear of lymphedema, which is very low with SLN Biopsy alone  I explained that women typically have no restrictions following sentinel lymph node biopsy alone  Many of her fears and concerns regarding complications were more related to true axillary lymph node dissection or regional jaison irradiation  We also discussed that there is no strong evidence for clinically significant tumor seeding with sentinel lymph node procedure for breast cancer      We discussed that after her breast conservation therapy adjuvant breast radiation is standard of care as radiation improves local regional control and over long-term can improve disease free survival from breast cancer  Gold standard treatment remains whole breast irradiation  She would be a candidate for hypofractionated radiation to 50Gy with boost to 60 Gy  This would be done over 4 week period  Given her current clinical presentation, she could be considered for accelerated partial breast irradiation  This would be 30Gy in 5 fractions to the tumor bed  We discussed that this treatment has decreased local control rates compared to whole breast irradiation, but the difference is small for lowest risk breast cancer patients  There is advantage in terms of time and decreased normal tissue irradiation exposure, but long term cosmesis is considered equivalent to whole breast radiation  If she is highly motivated and pathology confirms ER(+) subcentimeter disease then this approach is an option  We discussed briefly radiation side effects of skin irritation and fibrotic changes  She had significant anxiety and we discussed that currently her prognosis appears excellent  We will offer her treatment according to the severity and aggressiveness of her disease and our expectations of tolerance in an effort to maximize her disease control while balancing her toxicity  She decided that she would pursue lumpectomy with sentinel lymph node biopsy  She will return for discussion and final recommendations regarding adjuvant radiation once she has recovered from surgery and stage and Mammaprint results have been determined  Claude Enriquez MD  5/2/2022,4:57 PM      Portions of the record may have been created with voice recognition software  Occasional wrong word or "sound a like" substitutions may have occurred due to the inherent limitations of voice recognition software  Read the chart carefully and recognize, using context, where substitutions have occurred

## 2022-05-02 NOTE — PROGRESS NOTES
Duran Nipple 1955 is a 73 y o  female with recently diagnosed invasive mammary carcinoma , UT 0, HER2 1+  Detected via screening mammogram  She is being referred by Dr Brenda Nichols for RT post lumpectomy  History of ETOH rehab admission in 2019, anxiety and hypertension  Patient resistant to taking medications  2/28/2022 Mammo screening: Left: 2 - Benign  Right: 0 - Incomplete: Needs Additional Imaging Evaluation  Overall: 0 - Incomplete: Needs Additional Imaging Evaluation    3/8/2022 US right breast: RIGHT  A) MASS: There is a 9 mm x 7 mm irregularly shaped, non-parallel, hypoechoic mass with angular margins with shadowing seen in the right breast at 8 o'clock in the middle depth, 3 cm from the nipple  Additional images were also recorded in the 6 o'clock position 5 cm from the nipple of an oval well-defined nodule which is isoechoic to surrounding fat measuring 11 x 8 mm  This has no mammographic correlate and is likely a lipoma  Additional imaging of the axilla was also performed on the right and shows no suspicious adenopathy  IMPRESSION:   Correlate for the new right mammographic nodule on ultrasound shows a suspicious solid lesion for which ultrasound-guided core biopsy is recommended  These findings and recommendations were personally discussed with the patient in conjunction with our nurse navigator  ASSESSMENT/BI-RADS CATEGORY:  Right: 5 - Highly Suggestive of Malignancy  Overall: 5 - Highly Suggestive of Malignancy    4/12/2022 Biopsy Right breast, 8:00 position, 3 cm from nipple (core needle biopsy):  - Invasive mammary carcinoma of no special type (ductal, not otherwise specified)       - [place] grade: Score 6 (of 9), grade 2        * Tubule formation: Score 3 (of 3)        * Nuclear pleomorphism: Score 2 (of 3)        * Mitoses: Score 1 (of 3)     - Invasive carcinoma involves three (3) of three (3) submitted cores [7mm greatest dimension]  - Ductal carcinoma in-situ (DCIS): Absent   - Microcalcifications: Absent  - Best representative tumor block: A1    -- Sufficient tumor present for        Agendia Mammaprint/Blueprint (1 cm2 of invasive tumor in aggregate): Yes        MI Profile/Foundation One (at least 5 x 5 mm of tumor): Yes  Comment:  - ER, SC, Her2 have been ordered and results will be indicated in an addendum    - The tumor cells stain for CK AE1/3, GATA3, GCDFP15 with membranous Ecadherin and p120 expression   and SC are negative while ER is positive  [facility] and p63 show absent myoepithelial cells  The tumor cells show focal cribriform features  - Nurse navigator Meliton Hackett of the [facility] was notified of the diagnosis on 22 at 3:16 pm    B  Right breast, 6:00 position, 5 cm from nipple (core needle biopsy): - Benign breast tissue  - No carcinoma identified     2022 Consult [facility] SurgOnc: Plan for lumpectomy with sentinel node biopsy followed by radiation, and obtain mammaprint  2022 Patient outreach/Nurse navigation/Gris Shipman:  Blood work drawn for genetic testing  Patient requesting now not to have tests done  Does not want results  Tests on hold  2022 Patient outreach/Nurse navigation/Gris Shipman:  Concerns about side effects of RT and hormone therapy  Very anxious  Willing to proceed with consult at [address] [place] [address] to discuss  2022 Patient outreach/Social work/Lucia Pena:  Feels she cannot complete weeks of RT  Doing research and requesting 5 days of RT only  Agreeable to consult to discuss options  2022 Patient outreach/Nurse navigation/Gris Shipman:  Patient angry that tumor not removed yet  Upset about mammogram report  Upset that RadOnc consult not yet scheduled  Odalys Argue up on nurse navigator  2022 RadOnc: scheduled for consult   Patient states she has changed her mind about having a sentinel node biopsy done        Upcomin/4/2022 Dr Kandy Garcia follow up to discuss necessity of sentinel node biopsy  Patient has concerns about incisions and cancer spread  2022 Lumpectomy scheduled  2022 Surg Onc post op        Oncology History   Malignant neoplasm of lower-outer quadrant of right breast of female, estrogen receptor positive ([facility] [address] )   2022 Initial Diagnosis    Malignant neoplasm of lower-outer quadrant of right breast of female, estrogen receptor positive ([address] )     2022 Biopsy    Right breast ultrasound guided biopsy  A  8 o'clock, 3 cm from nipple  Invasive mammary carcinoma of no special type (ductal, not otherwise specified)     W[place] 0  HER2 1+    B  6 o'clock, 5 cm from nipple   Benign breast tissue, no carcinoma identified    Concordant  Malignancy appears unifocal  Mass measures 0 9 cm on ultrasound  Ultrasound of Right axilla shows no suspicious adenopathy  Left breast has no suspicious findings  Cece  clip placed  2022 Genetic Testing    Invitae ordered  Blood drawn  Patient then refused testing       2022 Genomic Testing    MammaPrint   Low risk  Luminal type A  MammaPrint index: +0 [phone]          Review of Systems:  Review of Systems   Constitutional: Negative  HENT: Negative  Eyes: Negative  Respiratory: Negative  Cardiovascular: Negative  Gastrointestinal: Negative  Endocrine: Negative  Genitourinary: Positive for frequency and urgency  Musculoskeletal: Positive for neck pain (intermittent)  Skin: Negative  Allergic/Immunologic: Negative  Neurological: Negative  Hematological: Negative  Psychiatric/Behavioral: Negative for sleep disturbance and suicidal ideas  The patient is nervous/anxious  Clinical Trial: no    OB/GYN History:  The patient underwent menarche at 16 years  Menopause Status Pre, Brunilda, Unknown and No Answer  No LMP recorded  Patient is postmenopausal   Menopause at 54} years  Menopause Reason natural  Hormone replacement therapy: no    2   Para 1   Age at first delivery being 31 years  Nursing: no    Birth control pills: yes    Years used 10    Pregnancy test needed:  no    ONCOTYPE/MAMMOPRINT results Mammaprint    PFT N/A    Prior Radiation no    Teaching NIH book, SIM, side effects    MST yes    Implantable Devices (Port, Pacemaker, pain stimulator) no    Hip Replacement  no    Covid Vaccine Status no     [unfilled]  Health Maintenance   Topic Date Due    Hepatitis C Screening  Never done    COVID-19 Vaccine (1) Never done    HIV Screening  Never done    DTaP,Tdap,and Td Vaccines (1 - Tdap) Never done    Osteoporosis Screening  Never done    Falls: Plan of Care  Never done    Pneumococcal Vaccine: 65+ Years (1 of 1 - PPSV23) Never done    Influenza Vaccine (Season Ended) 09/01/2022    Medicare Annual Wellness Visit (AWV)  01/18/2023    BMI: Followup Plan  01/18/2023    Breast Cancer Screening: Mammogram  02/28/2023    Fall Risk  04/20/2023    Depression Screening  04/20/2023    Depression Follow-up Plan  04/20/2023    BMI: Adult  04/29/2023    Colorectal Cancer Screening  01/24/2025    HIB Vaccine  Aged Out    Hepatitis B Vaccine  Aged Out    IPV Vaccine  Aged Out    Hepatitis A Vaccine  Aged Out    Meningococcal ACWY Vaccine  Aged Out    HPV Vaccine  Aged Out     Past Medical History:   Diagnosis Date    Hypertension      Past Surgical History:   Procedure Laterality Date    APPENDECTOMY      BREAST BIOPSY      benign    COLONOSCOPY      US BREAST SHAILESH  NEEDLE LOC RIGHT Right 4/12/2022    US GUIDANCE BREAST BIOPSY RIGHT EACH ADDITIONAL Right 4/12/2022    US GUIDED BREAST BIOPSY RIGHT COMPLETE Right 4/12/2022     Family History   Problem Relation Age of Onset    Breast cancer Mother     Heart disease Mother     Heart disease Father     No Known Problems Sister     No Known Problems Brother     No Known Problems Brother     No Known Problems Brother     Cancer Maternal Grandmother 80        either liver or pancreatic cancer not sure    Arrhythmia Son     No Known Problems Maternal Aunt     Uterine cancer Paternal Aunt 50    Breast cancer Paternal Aunt 52        maybe not sure     Social History     Tobacco Use    Smoking status: Never Smoker    Smokeless tobacco: Never Used   Vaping Use    Vaping Use: Never used   Substance Use Topics    Alcohol use: Not Currently    Drug use: Never        Current Outpatient Medications:     acetaminophen-codeine (TYLENOL #3) 300-30 mg per tablet, Take 1 tablet by mouth every 6 (six) hours as needed for moderate pain, Disp: 20 tablet, Rfl: 0    cholecalciferol (VITAMIN D3) 1,000 units tablet, Take 1,000 Units by mouth daily, Disp: , Rfl:     Digestive Enzymes (DIGESTIVE ENZYME PO), Take by mouth 3 (three) times a day Taken after meals, Disp: , Rfl:     fluticasone (FLONASE) 50 mcg/act nasal spray, 1 spray into each nostril daily, Disp: 11 1 mL, Rfl: 2    lisinopril (ZESTRIL) 10 mg tablet, Take 10 mg by mouth daily, Disp: , Rfl:     LORazepam (ATIVAN) 0 5 mg tablet, Take 1 tablet (0 5 mg total) by mouth once for 1 dose, Disp: 1 tablet, Rfl: 0    naloxone (NARCAN) 4 mg/0 1 mL nasal spray, Administer 1 spray into a nostril  If no response after 2-3 minutes, give another dose in the other nostril using a new spray , Disp: 1 each, Rfl: 1    NON FORMULARY, Kyolic, Disp: , Rfl:     Probiotic Product (PROBIOTIC PO), Take by mouth in the morning, Disp: , Rfl:   No Known Allergies   There were no vitals filed for this visit

## 2022-05-03 LAB — SCAN RESULT: NORMAL

## 2022-05-04 NOTE — ANESTHESIA PREPROCEDURE EVALUATION
Procedure:  SHAILESH  DIRECTED LUMPECTOMY (Right Breast)  LYMPHATIC MAPPING WITH BLUE AND RADIOACTIVE DYES, SENTINEL LYMPH  NODE BIOPSY, INJECTION IN OR AT 0930 BY DR FERNANDEZ (Right Axilla)  POSSIBLE AXILLARY DISSECTION (Right Axilla)    Relevant Problems   CARDIO   (+) Hyperlipidemia   (+) Primary hypertension      GYN   (+) Malignant neoplasm of lower-outer quadrant of right breast of female, estrogen receptor positive (HCC)      Lab Results   Component Value Date    WBC 6 13 04/20/2022    HGB 15 1 04/20/2022    HCT 45 5 04/20/2022    MCV 93 04/20/2022     04/20/2022     Lab Results   Component Value Date    CALCIUM 9 8 04/20/2022    K 3 8 04/20/2022    CO2 27 04/20/2022     04/20/2022    BUN 12 04/20/2022    CREATININE 0 60 04/20/2022     CAD/PCI/MI/CHF - Denies  COPD/ASTHMA/HARRISON - Denies  GERD - Denies  PROBLEMS WITH PRIOR ANESTHESIA - Denies  NPO STATUS - Patient reports last solid PO intake at 7:30 PM last night and last clear liquid PO intake at 7:30 PM last night  Physical Exam    Airway    Mallampati score: II  TM Distance: >3 FB  Neck ROM: full     Dental   No notable dental hx     Cardiovascular  Rhythm: regular,     Pulmonary  Comment: Speaking in full sentences, normal work of breathing, not tachypneic,     Other Findings        Anesthesia Plan  ASA Score- 3     Anesthesia Type- general with ASA Monitors  Additional Monitors:   Airway Plan: LMA  Plan Factors-Exercise tolerance (METS): >4 METS  Chart reviewed  Patient is not a current smoker  Obstructive sleep apnea risk education given perioperatively  Induction- intravenous  Postoperative Plan- Plan for postoperative opioid use  Informed Consent- Anesthetic plan and risks discussed with patient  I personally reviewed this patient with the CRNA  Discussed and agreed on the Anesthesia Plan with the CRNA  Leighton Diaz MD, personally examined the patient, reviewed the patient history and laboratory data, and explained the risks/benefits of the anesthetic to the patient  The patient has signed the appropriate consents and is ready to proceed

## 2022-05-05 ENCOUNTER — ANESTHESIA (OUTPATIENT)
Dept: PERIOP | Facility: HOSPITAL | Age: 67
End: 2022-05-05
Payer: MEDICARE

## 2022-05-05 ENCOUNTER — APPOINTMENT (OUTPATIENT)
Dept: MAMMOGRAPHY | Facility: CLINIC | Age: 67
End: 2022-05-05
Payer: MEDICARE

## 2022-05-05 ENCOUNTER — HOSPITAL ENCOUNTER (OUTPATIENT)
Dept: NUCLEAR MEDICINE | Facility: HOSPITAL | Age: 67
Discharge: HOME | End: 2022-05-05
Attending: SURGERY
Payer: MEDICARE

## 2022-05-05 ENCOUNTER — HOSPITAL ENCOUNTER (OUTPATIENT)
Facility: HOSPITAL | Age: 67
Setting detail: OUTPATIENT SURGERY
Discharge: HOME | End: 2022-05-05
Attending: SURGERY | Admitting: SURGERY
Payer: MEDICARE

## 2022-05-05 VITALS
HEART RATE: 71 BPM | HEIGHT: 65 IN | RESPIRATION RATE: 16 BRPM | SYSTOLIC BLOOD PRESSURE: 193 MMHG | DIASTOLIC BLOOD PRESSURE: 88 MMHG | TEMPERATURE: 98.2 F | OXYGEN SATURATION: 100 % | BODY MASS INDEX: 26.3 KG/M2 | WEIGHT: 157.85 LBS

## 2022-05-05 DIAGNOSIS — C50.511 MALIGNANT NEOPLASM OF LOWER-OUTER QUADRANT OF RIGHT BREAST OF FEMALE, ESTROGEN RECEPTOR POSITIVE (HCC): ICD-10-CM

## 2022-05-05 DIAGNOSIS — N63.0 BREAST LUMP OR MASS: ICD-10-CM

## 2022-05-05 DIAGNOSIS — Z17.0 MALIGNANT NEOPLASM OF LOWER-OUTER QUADRANT OF RIGHT BREAST OF FEMALE, ESTROGEN RECEPTOR POSITIVE (HCC): ICD-10-CM

## 2022-05-05 PROCEDURE — 38792 RA TRACER ID OF SENTINL NODE: CPT | Performed by: SURGERY

## 2022-05-05 PROCEDURE — 88342 IMHCHEM/IMCYTCHM 1ST ANTB: CPT | Performed by: PATHOLOGY

## 2022-05-05 PROCEDURE — A9541 TC99M SULFUR COLLOID: HCPCS

## 2022-05-05 PROCEDURE — 38525 BIOPSY/REMOVAL LYMPH NODES: CPT | Performed by: SURGERY

## 2022-05-05 PROCEDURE — 38792 RA TRACER ID OF SENTINL NODE: CPT

## 2022-05-05 PROCEDURE — 88307 TISSUE EXAM BY PATHOLOGIST: CPT | Performed by: PATHOLOGY

## 2022-05-05 PROCEDURE — 19301 PARTIAL MASTECTOMY: CPT | Performed by: CLINICAL NURSE SPECIALIST

## 2022-05-05 PROCEDURE — 88341 IMHCHEM/IMCYTCHM EA ADD ANTB: CPT | Performed by: PATHOLOGY

## 2022-05-05 PROCEDURE — 76098 X-RAY EXAM SURGICAL SPECIMEN: CPT | Performed by: SURGERY

## 2022-05-05 PROCEDURE — 19301 PARTIAL MASTECTOMY: CPT | Performed by: SURGERY

## 2022-05-05 PROCEDURE — 38900 IO MAP OF SENT LYMPH NODE: CPT | Performed by: SURGERY

## 2022-05-05 RX ORDER — CEFAZOLIN SODIUM 1 G/50ML
SOLUTION INTRAVENOUS AS NEEDED
Status: DISCONTINUED | OUTPATIENT
Start: 2022-05-05 | End: 2022-05-05

## 2022-05-05 RX ORDER — MAGNESIUM HYDROXIDE 1200 MG/15ML
LIQUID ORAL AS NEEDED
Status: DISCONTINUED | OUTPATIENT
Start: 2022-05-05 | End: 2022-05-05 | Stop reason: HOSPADM

## 2022-05-05 RX ORDER — DEXAMETHASONE SODIUM PHOSPHATE 10 MG/ML
INJECTION, SOLUTION INTRAMUSCULAR; INTRAVENOUS AS NEEDED
Status: DISCONTINUED | OUTPATIENT
Start: 2022-05-05 | End: 2022-05-05

## 2022-05-05 RX ORDER — OXYCODONE HYDROCHLORIDE AND ACETAMINOPHEN 5; 325 MG/1; MG/1
1 TABLET ORAL ONCE
Status: COMPLETED | OUTPATIENT
Start: 2022-05-05 | End: 2022-05-05

## 2022-05-05 RX ORDER — FENTANYL CITRATE/PF 50 MCG/ML
25 SYRINGE (ML) INJECTION
Status: DISCONTINUED | OUTPATIENT
Start: 2022-05-05 | End: 2022-05-05 | Stop reason: HOSPADM

## 2022-05-05 RX ORDER — PROPOFOL 10 MG/ML
INJECTION, EMULSION INTRAVENOUS AS NEEDED
Status: DISCONTINUED | OUTPATIENT
Start: 2022-05-05 | End: 2022-05-05

## 2022-05-05 RX ORDER — MIDAZOLAM HYDROCHLORIDE 2 MG/2ML
INJECTION, SOLUTION INTRAMUSCULAR; INTRAVENOUS AS NEEDED
Status: DISCONTINUED | OUTPATIENT
Start: 2022-05-05 | End: 2022-05-05

## 2022-05-05 RX ORDER — LIDOCAINE HYDROCHLORIDE 10 MG/ML
INJECTION, SOLUTION EPIDURAL; INFILTRATION; INTRACAUDAL; PERINEURAL AS NEEDED
Status: DISCONTINUED | OUTPATIENT
Start: 2022-05-05 | End: 2022-05-05

## 2022-05-05 RX ORDER — LABETALOL HYDROCHLORIDE 5 MG/ML
INJECTION, SOLUTION INTRAVENOUS AS NEEDED
Status: DISCONTINUED | OUTPATIENT
Start: 2022-05-05 | End: 2022-05-05

## 2022-05-05 RX ORDER — PROMETHAZINE HYDROCHLORIDE 25 MG/ML
12.5 INJECTION, SOLUTION INTRAMUSCULAR; INTRAVENOUS ONCE AS NEEDED
Status: DISCONTINUED | OUTPATIENT
Start: 2022-05-05 | End: 2022-05-05 | Stop reason: HOSPADM

## 2022-05-05 RX ORDER — FENTANYL CITRATE 50 UG/ML
INJECTION, SOLUTION INTRAMUSCULAR; INTRAVENOUS AS NEEDED
Status: DISCONTINUED | OUTPATIENT
Start: 2022-05-05 | End: 2022-05-05

## 2022-05-05 RX ORDER — LABETALOL HYDROCHLORIDE 5 MG/ML
10 INJECTION, SOLUTION INTRAVENOUS ONCE
Status: DISCONTINUED | OUTPATIENT
Start: 2022-05-05 | End: 2022-05-05 | Stop reason: HOSPADM

## 2022-05-05 RX ORDER — CEFAZOLIN SODIUM 1 G/50ML
1000 SOLUTION INTRAVENOUS ONCE
Status: DISCONTINUED | OUTPATIENT
Start: 2022-05-05 | End: 2022-05-05 | Stop reason: HOSPADM

## 2022-05-05 RX ORDER — SODIUM CHLORIDE, SODIUM LACTATE, POTASSIUM CHLORIDE, CALCIUM CHLORIDE 600; 310; 30; 20 MG/100ML; MG/100ML; MG/100ML; MG/100ML
INJECTION, SOLUTION INTRAVENOUS CONTINUOUS PRN
Status: DISCONTINUED | OUTPATIENT
Start: 2022-05-05 | End: 2022-05-05

## 2022-05-05 RX ORDER — HYDROMORPHONE HCL/PF 1 MG/ML
0.5 SYRINGE (ML) INJECTION
Status: DISCONTINUED | OUTPATIENT
Start: 2022-05-05 | End: 2022-05-05 | Stop reason: HOSPADM

## 2022-05-05 RX ORDER — ISOSULFAN BLUE 50 MG/5ML
INJECTION, SOLUTION SUBCUTANEOUS AS NEEDED
Status: DISCONTINUED | OUTPATIENT
Start: 2022-05-05 | End: 2022-05-05 | Stop reason: HOSPADM

## 2022-05-05 RX ORDER — ONDANSETRON 2 MG/ML
INJECTION INTRAMUSCULAR; INTRAVENOUS AS NEEDED
Status: DISCONTINUED | OUTPATIENT
Start: 2022-05-05 | End: 2022-05-05

## 2022-05-05 RX ORDER — ONDANSETRON 2 MG/ML
4 INJECTION INTRAMUSCULAR; INTRAVENOUS ONCE AS NEEDED
Status: DISCONTINUED | OUTPATIENT
Start: 2022-05-05 | End: 2022-05-05 | Stop reason: HOSPADM

## 2022-05-05 RX ADMIN — FENTANYL CITRATE 50 MCG: 50 INJECTION, SOLUTION INTRAMUSCULAR; INTRAVENOUS at 10:27

## 2022-05-05 RX ADMIN — LABETALOL HYDROCHLORIDE 10 MG: 5 INJECTION, SOLUTION INTRAVENOUS at 11:24

## 2022-05-05 RX ADMIN — MIDAZOLAM HYDROCHLORIDE 2 MG: 1 INJECTION, SOLUTION INTRAMUSCULAR; INTRAVENOUS at 10:01

## 2022-05-05 RX ADMIN — SODIUM CHLORIDE, SODIUM LACTATE, POTASSIUM CHLORIDE, AND CALCIUM CHLORIDE: .6; .31; .03; .02 INJECTION, SOLUTION INTRAVENOUS at 10:01

## 2022-05-05 RX ADMIN — PROPOFOL 150 MG: 10 INJECTION, EMULSION INTRAVENOUS at 10:08

## 2022-05-05 RX ADMIN — LIDOCAINE HYDROCHLORIDE 50 MG: 10 INJECTION, SOLUTION EPIDURAL; INFILTRATION; INTRACAUDAL; PERINEURAL at 10:07

## 2022-05-05 RX ADMIN — OXYCODONE HYDROCHLORIDE AND ACETAMINOPHEN 1 TABLET: 5; 325 TABLET ORAL at 12:57

## 2022-05-05 RX ADMIN — CEFAZOLIN SODIUM 1000 MG: 1 SOLUTION INTRAVENOUS at 10:05

## 2022-05-05 RX ADMIN — FENTANYL CITRATE 50 MCG: 50 INJECTION, SOLUTION INTRAMUSCULAR; INTRAVENOUS at 10:06

## 2022-05-05 RX ADMIN — PROPOFOL 50 MG: 10 INJECTION, EMULSION INTRAVENOUS at 10:11

## 2022-05-05 RX ADMIN — FENTANYL CITRATE 25 MCG: 50 INJECTION, SOLUTION INTRAMUSCULAR; INTRAVENOUS at 10:50

## 2022-05-05 RX ADMIN — DEXAMETHASONE SODIUM PHOSPHATE 10 MG: 10 INJECTION, SOLUTION INTRAMUSCULAR; INTRAVENOUS at 10:15

## 2022-05-05 RX ADMIN — ONDANSETRON 4 MG: 2 INJECTION INTRAMUSCULAR; INTRAVENOUS at 11:04

## 2022-05-05 NOTE — INTERVAL H&P NOTE
H&P reviewed  After examining the patient I find no changes in the patients condition since the H&P had been written      Vitals:    05/05/22 0745   BP: (!) 199/96   Pulse: 76   Resp: 18   Temp: 98 6 °F (37 °C)   SpO2: 99%

## 2022-05-05 NOTE — ANESTHESIA POSTPROCEDURE EVALUATION
Post-Op Assessment Note    CV Status:  Stable  Pain Score: 0    Pain management: adequate     Mental Status:  Alert and awake   Hydration Status:  Euvolemic   PONV Controlled:  Controlled   Airway Patency:  Patent      Post Op Vitals Reviewed: Yes      Staff: CRNA   Comments: VSS, SV        No complications documented      BP (!) (P) 203/89 (05/05/22 1115)    Temp (!) (P) 97 4 °F (36 3 °C) (05/05/22 1115)    Pulse  84   Resp   14   SpO2   100%

## 2022-05-05 NOTE — OP NOTE
OPERATIVE REPORT  PATIENT NAME: Emilia Mccain    :  1955  MRN: [id number]  Pt Location: [facility] OR ROOM 01    SURGERY DATE: 2022    Surgeon(s) and Role:     * Breanna Kapoor MD - Primary      Jennifer Mendoza PA-C - Assisting    Preop Diagnosis:  Malignant neoplasm of lower-outer quadrant of right breast of female, estrogen receptor positive ([facility] [address] ) [C50 511, Z17 0]    Post-Op Diagnosis Codes:     * Malignant neoplasm of lower-outer quadrant of right breast of female, estrogen receptor positive ([facility] [address] ) [C50 511, Z17 0]    Procedure(s) (LRB):  SHAILESH  DIRECTED LUMPECTOMY (Right)  LYMPHATIC MAPPING WITH BLUE AND RADIOACTIVE DYES, SENTINEL LYMPH  NODE BIOPSY, INJECTION IN OR AT 0930 BY DR FERNANDEZ (Right)    Specimen(s):  ID Type Source Tests Collected by Time Destination   1 : right axillary sentinel lymph node hot and blue #1 Tissue Axillary lymph node TISSUE EXAM Breanna Miller MD 2022 1027    2 : right axillary sentinel lymph node hot and blue #2 Tissue Axillary lymph node TISSUE EXAM Breanna Miller MD 2022 1028    3 : right breast additional anterior margin, inked most distal margin (green) Tissue Breast, Right TISSUE EXAM Sophie Ochoa MD 2022 1031    4 : right breast additional inferior margin, inked most distal margin (blue) Tissue Breast, Right TISSUE EXAM Sophie Ochoa MD 2022 1033    5 : right breast additional lateral margin, inked most distal margin (red) Tissue Breast, Right TISSUE [address], MD 2022 1034    6 : right breast additional medial margin, inked most distal margin (yellow) Tissue Breast, Right TISSUE EXAM Sophie Ochoa MD 2022 1035    7 : right breast additional posterior  margin, inked most distal margin (black ) Tissue Breast, Right TISSUE EXAM Sophie Ochoa MD 2022 1036    8 : right breast additional superior margin, inked most distal margin (orange) Tissue Breast, Right TISSUE EXAM HADLEY Law MD 5/5/2022 1036    9 : right breast shiar  directed lumpectomy marked per margin protocol  Tissue Breast, Right TISSUE EXAM HADLEY Law MD 5/5/2022 1055        Estimated Blood Loss:   Minimal    Drains:  * No LDAs found *    Anesthesia Type:   General/LMA    Operative Indications:  Malignant neoplasm of lower-outer quadrant of right breast of female, estrogen receptor positive ([facility] [address] ) [C50 511, Z17 0]      Operative Findings:  SHIRA clip and biopsy clip both are in the specimen  SHIRA clip and biopsy clip both are in the specimen    Complications:   None    Procedure and Technique:    The prior post biopsy images were reviewed prior to the procedure  Lumpectomy  The patient was brought to the operation room and placed under general anesthesia  Attention was paid to ensure appropriate padding and correct positioning  The SHIRA  detector was then used to localize the [employer]  The breast skin was marked in this area  The right  breast was injected intradermally with technetium sulfur colloid in the nba-areolar region and 0 3cc of methylene blue was injected intradermally over the tumor  These areas were vigorously massaged to facilitate identification of the sentinel lymph node (SLN)  The breast and axillary region were then prepped and draped in a sterile fashion  I initiated a time-out, identifying the patient, the correct side and the above procedure  All parties agreed with the time out  The planned incision was then injected with 0 25% Marcaine for local anesthesia  The incision was sharply incised encompassing the blue dye  The SHIRA detector was used to guide the dissection  Superior, inferior, medial and lateral planes were developed around the SHIRA deflector and the specimen truncated beyond the SHIRA deflector  The specimen was then inked for orientation purposes    A specimen radiograph was taken in two dimensions  6 Additional margins were removed to optimize complete removal of the tumor  The additional margins were inked on the most distal area  All specimens were sent to pathology for permanent analysis  Superficial bleeding controlled with electrocautery and the wound was extensively irrigated  The wound was closed with two layers, an inner layer of 3-0 vicryl and a subcuticular layer of 4-0 monocryl  Steri-strips were applied  SLN Biopsy  The previously marked location of the sentinel lymph node was injected with 0 25% Marcaine  A curivilinear incision was made and dissection was taken down into the axillar proper with electrocautery  The bao counter was used to help localize the SLN  The sentinel lymph node was identified and removed with electrocautery  SLN Findings  The SLN was blue and radioactive  The lymph node was grossly normal and sent for permanent pathologic analysis  This wound also was irrigated extensively: superficial bleeding was controlled with electrocautery and then closed in 2 layers - an inner layer of 3-0 Vicryl and a subcuticular layer of 4-0 Monocryl  prineo were applied     I was present for the entire procedure and A physician assistant was required during the procedure for retraction tissue handling,dissection and suturing    Patient Disposition:  PACU       SIGNATURE: Frieda Rick MD  DATE: May 5, 2022  TIME: 10:57 AM

## 2022-05-09 ENCOUNTER — DOCUMENTATION (OUTPATIENT)
Dept: HEMATOLOGY ONCOLOGY | Facility: CLINIC | Age: 67
End: 2022-05-09

## 2022-05-10 ENCOUNTER — TELEPHONE (OUTPATIENT)
Dept: HEMATOLOGY ONCOLOGY | Facility: CLINIC | Age: 67
End: 2022-05-10

## 2022-05-10 NOTE — TELEPHONE ENCOUNTER
CALL RETURN FORM   Reason for patient call? Patient is calling to see if her results from her biopsy came back yet  She had surgery on 4/5  Patient's primary oncologist? Dr Miller Slider   Name of person the patient was calling for? Surgical oncology   Any additional information to add, if applicable? Best call number is [phone]   Informed patient that the message will be forwarded to the team and someone will get back to them as soon as possible    Did you relay this information to the patient?  yes

## 2022-05-10 NOTE — TELEPHONE ENCOUNTER
Called patient, discussed results are still in process and it could take up to 2 weeks to be finalized  Patient asked if that was normal or if that means something bad  I explained that's the wait time for all biopsies and surgeries typically  Patient stated understanding  She stated she does not go in to  Her my chart account and asked the office call her with the results when they are finalized  Patient stated she is also having no pain at this time and asked about showering  I let her know she can shower but to leave the prineo dressing on both sides and not to scrub the dressing but to let the soap suds run over it  Patient stated understanding and appreciative of call  No more questions at this time

## 2022-05-16 ENCOUNTER — TELEPHONE (OUTPATIENT)
Dept: HEMATOLOGY ONCOLOGY | Facility: CLINIC | Age: 67
End: 2022-05-16

## 2022-05-16 NOTE — TELEPHONE ENCOUNTER
Called patient and discussed the possibility of a seroma forming  Educated patient that this is not even considered a post op complication  This happens after surgery and to use warm wet compresses 3-4 times a day for 10-15 minutes at a time  This will help her body reabsorb the seroma  Patient stated understanding and will call if there are any signs or symptoms of infections  Patient aware of what to look for and has my direct office number if needed

## 2022-05-16 NOTE — TELEPHONE ENCOUNTER
Scheduling Appointment     Who Is Calling to Schedule  Patient    Doctor Dr Courtney Godinez   [facility]   Date and Time 05/18 at 1:00pm   Reason for scheduling appointment Follow up   Patient is concerned regarding a lump she found    Patient verbalized understanding    yes

## 2022-05-18 ENCOUNTER — OFFICE VISIT (OUTPATIENT)
Dept: SURGICAL ONCOLOGY | Facility: CLINIC | Age: 67
End: 2022-05-18
Payer: MEDICARE

## 2022-05-18 VITALS
RESPIRATION RATE: 14 BRPM | HEART RATE: 90 BPM | SYSTOLIC BLOOD PRESSURE: 180 MMHG | HEIGHT: 65 IN | BODY MASS INDEX: 26.99 KG/M2 | DIASTOLIC BLOOD PRESSURE: 102 MMHG | OXYGEN SATURATION: 98 % | WEIGHT: 162 LBS

## 2022-05-18 DIAGNOSIS — C50.511 MALIGNANT NEOPLASM OF LOWER-OUTER QUADRANT OF RIGHT BREAST OF FEMALE, ESTROGEN RECEPTOR POSITIVE (HCC): ICD-10-CM

## 2022-05-18 DIAGNOSIS — Z71.89 OTHER SPECIFIED COUNSELING: Primary | ICD-10-CM

## 2022-05-18 DIAGNOSIS — Z17.0 MALIGNANT NEOPLASM OF LOWER-OUTER QUADRANT OF RIGHT BREAST OF FEMALE, ESTROGEN RECEPTOR POSITIVE (HCC): ICD-10-CM

## 2022-05-18 DIAGNOSIS — Z98.890 STATUS POST RIGHT BREAST LUMPECTOMY: ICD-10-CM

## 2022-05-18 PROCEDURE — 99213 OFFICE O/P EST LOW 20 MIN: CPT | Performed by: SURGERY

## 2022-05-18 NOTE — PROGRESS NOTES
Surgical Oncology Follow Up  [facility]  [facility] [place]  [address]  [place] [address]    Hugh Hillman  1955  [id number]      Chief Complaint   Patient presents with    Post-op     Seroma         Assessment & Plan:   She was brought in today for examination of the surgical scars in the right breast and axilla no evidence of surgical site infection or drainable as seroma healing well  She is anxious about her pathology however pathology is still pending  Will follow her next week, for by the time we should have the pathology results  Cancer History:     Oncology History   Malignant neoplasm of lower-outer quadrant of right breast of female, estrogen receptor positive ([facility] [address] )   4/12/2022 Initial Diagnosis    Malignant neoplasm of lower-outer quadrant of right breast of female, estrogen receptor positive ([facility] [address] )     4/12/2022 Biopsy    Right breast ultrasound guided biopsy  A  8 o'clock, 3 cm from nipple  Invasive mammary carcinoma of no special type (ductal, not otherwise specified)     MS 0  HER2 1+    B  6 o'clock, 5 cm from nipple   Benign breast tissue, no carcinoma identified    Concordant  Malignancy appears unifocal  Mass measures 0 9 cm on ultrasound  Ultrasound of Right axilla shows no suspicious adenopathy  Left breast has no suspicious findings  Cece  clip placed  4/20/2022 Genetic Testing    [employer]ae ordered  Blood drawn  Patient then refused testing       4/21/2022 Genomic Testing    MammaPrint   Low risk  Luminal type A  MammaPrint index: +0 [phone]            Interval History:   Follow-up with right breast cancer pending pathology  Review of Systems:   Review of Systems   Constitutional: Negative for chills and fever  HENT: Negative for ear pain and sore throat  Eyes: Negative for pain and visual disturbance  Respiratory: Negative for cough and shortness of breath      Cardiovascular: Negative for chest pain and palpitations  Gastrointestinal: Negative for abdominal pain and vomiting  Genitourinary: Negative for dysuria and hematuria  Musculoskeletal: Negative for arthralgias and back pain  Skin: Negative for color change and rash  Neurological: Negative for seizures and syncope  All other systems reviewed and are negative        Past Medical History     Patient Active Problem List   Diagnosis    Hyperlipidemia    Impaired fasting glucose    Malignant neoplasm of lower-outer quadrant of right breast of female, estrogen receptor positive ([facility] [address] )    Primary hypertension    Breast lump or mass     Past Medical History:   Diagnosis Date    Breast cancer ([facility] [address] )     Hyperlipidemia     Hypertension      Past Surgical History:   Procedure Laterality Date    APPENDECTOMY      BREAST BIOPSY Left 2002    benign    BREAST LUMPECTOMY Right 5/5/2022    Procedure: SHAILESH  DIRECTED LUMPECTOMY;  Surgeon: Iman Bautista MD;  [facility]: [facility];  Service: Surgical Oncology    COLONOSCOPY      LYMPH NODE BIOPSY Right 5/5/2022    Procedure: LYMPHATIC MAPPING WITH BLUE AND RADIOACTIVE DYES, SENTINEL LYMPH  NODE BIOPSY, INJECTION IN OR AT 0930 BY DR Dali Hills;  Surgeon: Iman Bautista MD;  [facility]: [facility];  Service: Surgical Oncology    US BREAST SHAILESH  NEEDLE LOC RIGHT Right 4/12/2022    US GUIDANCE BREAST BIOPSY RIGHT EACH ADDITIONAL Right 4/12/2022    US GUIDED BREAST BIOPSY RIGHT COMPLETE Right 4/12/2022     Family History   Problem Relation Age of Onset   [employer] Breast cancer Mother     Heart disease Mother     Heart disease Father     No Known Problems Sister     No Known Problems Brother     No Known Problems Brother     No Known Problems Brother     Cancer Maternal Grandmother 80        either liver or pancreatic cancer not sure    Arrhythmia Son     No Known Problems Maternal Aunt     Uterine cancer Paternal Aunt 50    Breast cancer Paternal Aunt 46        maybe not sure Social History     Socioeconomic History    Marital status: /Civil Union     Spouse name: Not on file    Number of children: Not on file    Years of education: Not on file    Highest education level: Not on file   Occupational History    Not on file   Tobacco Use    Smoking status: Never Smoker    Smokeless tobacco: Never Used   Vaping Use    Vaping Use: Never used   Substance and Sexual Activity    Alcohol use: Not Currently     Alcohol/week: 7 0 standard drinks     Types: 7 Glasses of wine per week     Comment: none in the last month    Drug use: Never    Sexual activity: Yes     Partners: Male   Other Topics Concern    Not on file   Social History Narrative    Not on file     Social Determinants of Health     Financial Resource Strain: Not on file   Food Insecurity: Not on file   Transportation Needs: Not on file   Physical Activity: Not on file   Stress: Not on file   Social Connections: Not on file   Intimate Partner Violence: Not on file   Housing Stability: Not on file       Current Outpatient Medications:     cholecalciferol (VITAMIN D3) 1,000 units tablet, Take 1,000 Units by mouth daily, Disp: , Rfl:     Digestive Enzymes (DIGESTIVE ENZYME PO), Take by mouth 3 (three) times a day Taken after meals, Disp: , Rfl:     NON FORMULARY, Kyolic, Disp: , Rfl:     Probiotic Product (PROBIOTIC PO), Take by mouth in the morning, Disp: , Rfl:     fluticasone (FLONASE) 50 mcg/act nasal spray, 1 spray into each nostril daily, Disp: 11 1 mL, Rfl: 2    lisinopril (ZESTRIL) 10 mg tablet, Take 10 mg by mouth daily (Patient not taking: Reported on 5/18/2022), Disp: , Rfl:   No Known Allergies    Physical Exam:     Vitals:    05/18/22 1101   BP: (!) 180/102   Pulse: 90   Resp: 14   SpO2: 98%     Physical Exam  Constitutional:       Appearance: Normal appearance  HENT:      Head: Normocephalic and atraumatic        Nose: Nose normal       Mouth/Throat:      Mouth: Mucous membranes are moist  Eyes:      Pupils: Pupils are equal, round, and reactive to light  Cardiovascular:      Rate and Rhythm: Normal rate  Pulses: Normal pulses  Heart sounds: Normal heart sounds  Pulmonary:      Effort: Pulmonary effort is normal       Breath sounds: Normal breath sounds  Chest:          Comments: Right breast no drainable seroma he decisions are healing well Prineo dressing in place  Abdominal:      General: Bowel sounds are normal       Palpations: Abdomen is soft  Musculoskeletal:         General: Normal range of motion  Cervical back: Normal range of motion and neck supple  Skin:     General: Skin is warm  Neurological:      General: No focal deficit present  Mental Status: She is alert and oriented to person, place, and time  Psychiatric:         Mood and Affect: Mood normal          Behavior: Behavior normal          Thought Content: Thought content normal          Judgment: Judgment normal            Results & Discussion:   I did review normal healing process  she understands and  agrees   All patient questions were answered  Advance Care Planning/Advance Directives: Nancy Rodriguez MD discussed the disease status with Ghaad Matamoros  today 05/18/22  treatment plans and follow-up with the patient

## 2022-05-23 ENCOUNTER — HOSPITAL ENCOUNTER (EMERGENCY)
Facility: HOSPITAL | Age: 67
Discharge: HOME | End: 2022-05-23
Attending: EMERGENCY MEDICINE | Admitting: EMERGENCY MEDICINE
Payer: MEDICARE

## 2022-05-23 VITALS
DIASTOLIC BLOOD PRESSURE: 121 MMHG | TEMPERATURE: 98.1 F | SYSTOLIC BLOOD PRESSURE: 243 MMHG | RESPIRATION RATE: 18 BRPM | HEART RATE: 83 BPM | OXYGEN SATURATION: 99 %

## 2022-05-23 DIAGNOSIS — R23.8 SKIN IRRITATION: Primary | ICD-10-CM

## 2022-05-23 PROCEDURE — 99283 EMERGENCY DEPT VISIT LOW MDM: CPT

## 2022-05-23 PROCEDURE — 99284 EMERGENCY DEPT VISIT MOD MDM: CPT

## 2022-05-24 ENCOUNTER — TELEPHONE (OUTPATIENT)
Dept: FAMILY MEDICINE CLINIC | Facility: CLINIC | Age: 67
End: 2022-05-24

## 2022-05-24 NOTE — DISCHARGE INSTRUCTIONS
Follow up with PCP  Follow up with Breast surgeon  Return to the ED with new or worsening symptoms including but not limited to swelling, fevers, chills, spreading redness of the site

## 2022-05-24 NOTE — TELEPHONE ENCOUNTER
I do not agree with this decision however she has the autonomy to make her own decisions  The 10 mg of Lisinopril is not enough as her last recorded BP in the chart was 243/121  Please make it clear to her that the risk of not approiately treating her blood pressure includes heart attack, stroke and possibly death  She can take 5 mg of the Lisinopril that she has at home if that is her choice      Augusta Bloch, DO [facility]  5/24/2022 1:37 PM

## 2022-05-24 NOTE — TELEPHONE ENCOUNTER
T/c from pt --- reports she has not been taking her lisinopril and that she wants to go back to a 5 mg tablet instead of 10 mg -- reports she thinks her bp will be ok on 5, and that it was high because she was going to oncology/specialists for cancer  Also, she wants to minimize the amount she takes because there something in lisinopril that causes cancer

## 2022-05-24 NOTE — ED NOTES
Patient initially refused BP stating "Its going to pump up too radha, I dont need to be told its high, its always high " Pt then agreeable to lower FA BP, after reading pt became agitated and removed cuff throwing it to floor stating "I knew It was going to read high, that's not right, its a lie   I told you not to check it!"     Zahra Reyes, ANAHI  05/23/22 2016

## 2022-05-24 NOTE — ED PROVIDER NOTES
History  Chief Complaint   Patient presents with    Post-op Problem     Had a lumpectomy 3 weeks ago, started with discomfort and seen at surgeon and told it was ok  Reports today started with redness and "lump" behind incision"     Patient is a 73year old female with a medical history of breast cancer presenting to the ED with a complaint of right breast redness  Reports she had a lumpectomy 3 weeks ago with sentinel biopsy of the right breast  Reports she saw her surgeon 4 days ago for a complaint of a lump under her arm and under the right breast  Reports her surgeon said she was healing normally and the lumps were of no concern  Reports she has been wearing a compressive bra 24/7 only taking it off to wash  Reports today she had noticed redness underneath and above the right breast  Reports she is concerned for cellulitis  Reports she feels as though the bras she is wearing is causing irritation to the area, reports she felt rubbing of her right breast in the bra she was wearing today  Denies swelling, itching, fevers, chills, abdominal pain, nausea, vomiting, diarrhea, constipation, chest pain, shortness of breath or difficulty breathing  Does not offer any other concerns or complaints  History provided by:  Patient   used: No    Rash  Location:  Torso  Torso rash location:  R breast  Quality: redness    Quality: not blistering, not bruising, not draining, not dry, not itchy, not painful, not peeling, not swelling and not weeping    Duration:  1 day  Timing:  Constant  Progression:  Unchanged  Relieved by:  None tried  Worsened by:  Nothing  Ineffective treatments:  None tried  Associated symptoms: no abdominal pain, no diarrhea, no fatigue, no fever, no headaches, no induration, no joint pain, no nausea, no shortness of breath, no sore throat, no URI and not vomiting        Prior to Admission Medications   Prescriptions Last Dose Informant Patient Reported? Taking?    Digestive Enzymes (DIGESTIVE ENZYME PO)  Self Yes No   Sig: Take by mouth 3 (three) times a day Taken after meals   NON FORMULARY  Self Yes No   Sig: Kyolic   Probiotic Product (PROBIOTIC PO)  Self Yes No   Sig: Take by mouth in the morning   cholecalciferol (VITAMIN D3) 1,000 units tablet  Self Yes No   Sig: Take 1,000 Units by mouth daily   fluticasone (FLONASE) 50 mcg/act nasal spray  Self No No   Si spray into each nostril daily   lisinopril (ZESTRIL) 10 mg tablet  Self Yes No   Sig: Take 10 mg by mouth daily   Patient not taking: Reported on 2022      Facility-Administered Medications: None       Past Medical History:   Diagnosis Date    Breast cancer [facility] [address] )     Hyperlipidemia     Hypertension        Past Surgical History:   Procedure Laterality Date    APPENDECTOMY      BREAST BIOPSY Left 2002    benign    BREAST LUMPECTOMY Right 2022    Procedure: SHAILESH  DIRECTED LUMPECTOMY;  Surgeon: HADLEY Law MD;  [facility];  Service: Surgical Oncology    COLONOSCOPY      LYMPH NODE BIOPSY Right 2022    Procedure: LYMPHATIC MAPPING WITH BLUE AND RADIOACTIVE DYES, SENTINEL LYMPH  NODE BIOPSY, INJECTION IN OR AT 0930 BY DR Toño Eaton;  Surgeon: HADLEY Law MD;  [facility];  Service: Surgical Oncology    US BREAST SHAILESH  NEEDLE LOC RIGHT Right 2022    US GUIDANCE BREAST BIOPSY RIGHT EACH ADDITIONAL Right 2022    US GUIDED BREAST BIOPSY RIGHT COMPLETE Right 2022       Family History   Problem Relation Age of Onset    Breast cancer Mother     Heart disease Mother     Heart disease Father     No Known Problems Sister     No Known Problems Brother     No Known Problems Brother     No Known Problems Brother     Cancer Maternal Grandmother 80        either liver or pancreatic cancer not sure    Arrhythmia Son     No Known Problems Maternal Aunt     Uterine cancer Paternal Aunt 50    Breast cancer Paternal Aunt 52        maybe not sure I have reviewed and agree with the history as documented  E-Cigarette/Vaping    E-Cigarette Use Never User      E-Cigarette/Vaping Substances    Nicotine No     THC No     CBD No     Flavoring No     Other No     Unknown No      Social History     Tobacco Use    Smoking status: Never Smoker    Smokeless tobacco: Never Used   Vaping Use    Vaping Use: Never used   Substance Use Topics    Alcohol use: Not Currently     Alcohol/week: 7 0 standard drinks     Types: 7 Glasses of wine per week     Comment: none in the last month    Drug use: Never       Review of Systems   Constitutional: Negative for chills, fatigue and fever  HENT: Negative for ear pain and sore throat  Eyes: Negative for pain and visual disturbance  Respiratory: Negative for cough and shortness of breath  Cardiovascular: Negative for chest pain and palpitations  Gastrointestinal: Negative for abdominal pain, diarrhea, nausea and vomiting  Genitourinary: Negative for dysuria and hematuria  Musculoskeletal: Negative for arthralgias and back pain  Skin: Positive for rash  Negative for color change  Neurological: Negative for seizures, syncope and headaches  All other systems reviewed and are negative  Physical Exam  Physical Exam  Vitals and nursing note reviewed  Constitutional:       Appearance: Normal appearance  HENT:      Head: Normocephalic and atraumatic  Right Ear: External ear normal       Left Ear: External ear normal       Nose: Nose normal       Mouth/Throat:      Mouth: Mucous membranes are moist    Eyes:      General: No scleral icterus  Right eye: No discharge  Left eye: No discharge  Conjunctiva/sclera: Conjunctivae normal    Cardiovascular:      Rate and Rhythm: Normal rate  Pulmonary:      Effort: Pulmonary effort is normal  No respiratory distress  Chest:      Chest wall: No mass, swelling or tenderness     Breasts:      Right: Skin change (redness above and below the breast) present  No swelling, bleeding, inverted nipple, mass or nipple discharge  Left: Normal          Musculoskeletal:         General: No swelling, deformity or signs of injury  Normal range of motion  Cervical back: Normal range of motion and neck supple  No rigidity  Skin:     General: Skin is warm and dry  Coloration: Skin is not jaundiced  Findings: No erythema or rash  Neurological:      General: No focal deficit present  Mental Status: She is alert and oriented to person, place, and time  Mental status is at baseline  Cranial Nerves: No cranial nerve deficit  Gait: Gait normal    Psychiatric:         Mood and Affect: Mood normal          Behavior: Behavior normal          Thought Content: Thought content normal          Judgment: Judgment normal          Vital Signs  ED Triage Vitals [05/23/22 2011]   Temperature Pulse Respirations Blood Pressure SpO2   98 1 °F (36 7 °C) 83 18 (!) 243/121 99 %      Temp Source Heart Rate Source Patient Position - Orthostatic VS BP Location FiO2 (%)   Oral Monitor Sitting Left arm --      Pain Score       --           Vitals:    05/23/22 2011   BP: (!) 243/121   Pulse: 83   Patient Position - Orthostatic VS: Sitting         Visual Acuity      ED Medications  Medications - No data to display    Diagnostic Studies  Results Reviewed     None                 No orders to display              Procedures  Procedures         ED Course  ED Course as of 05/23/22 2222   Mon May 23, 2022   2140 Reports high blood pressure, reports she has white coat syndrome  BP on reexamination was 239/115  Reports she refuses to take her BP medication  Educated to follow up with her PCP concerning her BP and medication compliance                  Identification of Seniors at [facility] Most Recent Value   (ISAR) Identification of Seniors at Risk    Before the illness or injury that brought you to the Emergency, did you need someone to help you on a regular basis? 0 Filed at: 05/23/2022 2012   In the last 24 hours, have you needed more help than usual? 0 Filed at: 05/23/2022 2012   Have you been hospitalized for one or more nights during the past 6 months? 1 Filed at: 05/23/2022 2012   In general, do you see well? 0 Filed at: 05/23/2022 2012   In general, do you have serious problems with your memory? 0 Filed at: 05/23/2022 2012   Do you take more than three different medications every day? 1 Filed at: 05/23/2022 2012   ISAR Score 2 Filed at: 05/23/2022 2012                      SBIRT 22yo+    Flowsheet Row Most Recent Value   SBIRT (25 yo +)    In order to provide better care to our patients, we are screening all of our patients for alcohol and drug use  Would it be okay to ask you these screening questions? No Filed at: 05/23/2022 2140   STEVE: How many times in the past year have you    Used an illegal drug or used a prescription medication for non-medical reasons? Never Filed at: 05/23/2022 2140                    MDM  Number of Diagnoses or Management Options  Skin irritation  Diagnosis management comments: This is a 73year old female presenting to the ED with a complaint of redness of the right breast  Reports she had a lumpectomy for breast cancer of the right breast 3 weeks ago  Reports she has seen her surgeon 4 days ago concerning a lump under the right arm and for firmness of the right breast  Reports that he had said everything was healing normally and there was no concern  Reports today she noticed redness of the breast, is concerned for cellulitis  Denies fevers, chills, abdominal pain, nausea, vomiting, diarrhea, constipation, chest pain, shortness of breath or difficulty breathing  Differential diagnosis to include but is not limited to: contact dermatitis, skin irritation, cellulitis  Low suspicion for cellulitis, patient denies swelling, fevers, tenderness of the area   The area of redness is confined to the areas that her bra is constantly contacting  The area is not warm to the touch, there is no spreading of the redness  High suspicion of skin irritation from the constant contact of the compressive bra  Final ED assessment: Patient is stable and well appearing  Patient is agreeable with the plan for discharge  Discussed follow up with her surgeon, scheduled on Thursday  Discussed to monitor the location and strict return precautions were discussed including but not limited to swelling, spreading redness, fevers, tenderness of the area  Disposition  Final diagnoses:   Skin irritation     Time reflects when diagnosis was documented in both MDM as applicable and the Disposition within this note     Time User Action Codes Description Comment    5/23/2022  9:36 PM Montana Abraham Add [R23 8] Skin irritation       ED Disposition     ED Disposition   Discharge    Condition   Stable    Date/Time   Mon May 23, 2022  9:35 PM    Comment   [facility] discharge to home/self care                 Follow-up Information     Follow up With Specialties Details Why Contact Info Additional Information    Gallito Ulrich, DO Family Medicine Call in 3 days For follow up [address] [facility] [facility] [place] [address] Emergency Department Emergency Medicine Go to  If symptoms worsen [address] Emergency Department, [address] [place] [address]          Discharge Medication List as of 5/23/2022  9:39 PM      CONTINUE these medications which have NOT CHANGED    Details   cholecalciferol (VITAMIN D3) 1,000 units tablet Take 1,000 Units by mouth daily, Historical Med      Digestive Enzymes (DIGESTIVE ENZYME PO) Take by mouth 3 (three) times a day Taken after meals, Historical Med      fluticasone (FLONASE) 50 mcg/act nasal spray 1 spray into each nostril daily, Starting Thu 4/28/2022, Normal      lisinopril (ZESTRIL) 10 mg tablet Take 10 mg by mouth daily, Historical Med      NON FORMULARY Kyolic, Historical Med      Probiotic Product (PROBIOTIC PO) Take by mouth in the morning, Historical Med             No discharge procedures on file      PDMP Review       Value Time User    PDMP Reviewed  Yes 4/20/2022 11:30 AM Sonny Joyner MD          ED Provider  Electronically Signed by           Joleen Dunaway PA-C  05/23/22 8270

## 2022-05-25 ENCOUNTER — OFFICE VISIT (OUTPATIENT)
Dept: SURGICAL ONCOLOGY | Facility: CLINIC | Age: 67
End: 2022-05-25

## 2022-05-25 VITALS
HEIGHT: 65 IN | WEIGHT: 155 LBS | DIASTOLIC BLOOD PRESSURE: 118 MMHG | RESPIRATION RATE: 16 BRPM | TEMPERATURE: 97.9 F | SYSTOLIC BLOOD PRESSURE: 182 MMHG | BODY MASS INDEX: 25.83 KG/M2 | OXYGEN SATURATION: 98 % | HEART RATE: 88 BPM

## 2022-05-25 DIAGNOSIS — Z98.890 STATUS POST RIGHT BREAST LUMPECTOMY: ICD-10-CM

## 2022-05-25 DIAGNOSIS — Z17.0 MALIGNANT NEOPLASM OF LOWER-OUTER QUADRANT OF RIGHT BREAST OF FEMALE, ESTROGEN RECEPTOR POSITIVE (HCC): ICD-10-CM

## 2022-05-25 DIAGNOSIS — C50.511 MALIGNANT NEOPLASM OF LOWER-OUTER QUADRANT OF RIGHT BREAST OF FEMALE, ESTROGEN RECEPTOR POSITIVE (HCC): ICD-10-CM

## 2022-05-25 DIAGNOSIS — Z71.89 OTHER SPECIFIED COUNSELING: Primary | ICD-10-CM

## 2022-05-25 PROCEDURE — 99024 POSTOP FOLLOW-UP VISIT: CPT | Performed by: SURGERY

## 2022-05-25 NOTE — PROGRESS NOTES
Surgical Oncology Follow Up  [facility]  [facility] [place] [address]    Hugh Hillman  1955  [id number]      No chief complaint on file  Assessment & Plan:   Status post right breast conservation surgery and sentinel lymph node biopsy pathology is negative margins and negative sentinel lymph node  Pathology results was discussed and copy of the report was given to the patient  She had a MammaPrint which is low risk and she has an appointment to see medical oncologist   I will see her in 2 weeks to check the surgical site there is no infection however healing process is low no erythema or redness  She was told to call us with any questions or concerns  We also have emphasized her to continue wearing the bra day and night  Cancer History:     Oncology History   Malignant neoplasm of lower-outer quadrant of right breast of female, estrogen receptor positive ([facility] [address] )   4/12/2022 Initial Diagnosis    Malignant neoplasm of lower-outer quadrant of right breast of female, estrogen receptor positive ([facility] [address] )     4/12/2022 Biopsy    Right breast ultrasound guided biopsy  A  8 o'clock, 3 cm from nipple  Invasive mammary carcinoma of no special type (ductal, not otherwise specified)     WA 0  HER2 1+    B  6 o'clock, 5 cm from nipple   Benign breast tissue, no carcinoma identified    Concordant  Malignancy appears unifocal  Mass measures 0 9 cm on ultrasound  Ultrasound of Right axilla shows no suspicious adenopathy  Left breast has no suspicious findings  Cece  clip placed        4/20/2022 Genetic Testing    [employer]ae ordered  Blood drawn  Patient then refused testing       4/21/2022 Genomic Testing    MammaPrint   Low risk  Luminal type A  MammaPrint index: +0 291      5/5/2022 Surgery    Right breast cece  directed lumpectomy with sentinel lymph node biopsy  Invasive ductal carcinoma  Grade 2  1 3 cm  Margins negative  0/2 lymph nodes             Interval History:   Follow-up after right breast conservation surgery and sentinel lymph node biopsy postop visit  Review of Systems:   Review of Systems   Constitutional: Negative for chills and fever  HENT: Negative for ear pain and sore throat  Eyes: Negative for pain and visual disturbance  Respiratory: Negative for cough and shortness of breath  Cardiovascular: Negative for chest pain and palpitations  Gastrointestinal: Negative for abdominal pain and vomiting  Genitourinary: Negative for dysuria and hematuria  Musculoskeletal: Negative for arthralgias and back pain  Skin: Negative for color change and rash  Neurological: Negative for seizures and syncope  All other systems reviewed and are negative        Past Medical History     Patient Active Problem List   Diagnosis    Hyperlipidemia    Impaired fasting glucose    Malignant neoplasm of lower-outer quadrant of right breast of female, estrogen receptor positive ([facility] [address] [facility]    Primary hypertension    Breast lump or mass     Past Medical History:   Diagnosis Date    [facility]     Hyperlipidemia     Hypertension      Past Surgical History:   Procedure Laterality Date    APPENDECTOMY      BREAST BIOPSY Left 2002    benign    BREAST LUMPECTOMY Right 5/5/2022    Procedure: SHAILESH  DIRECTED LUMPECTOMY;  Surgeon: Kris Shabazz MD;  Location: [facility];  Service: Surgical Oncology    COLONOSCOPY      LYMPH NODE BIOPSY Right 5/5/2022    Procedure: LYMPHATIC MAPPING WITH BLUE AND RADIOACTIVE DYES, SENTINEL LYMPH  NODE BIOPSY, INJECTION IN OR  [facility];  Surgeon: Kris Shabazz MD;  Location: [facility];  Service: Surgical Oncology    US BREAST SHAILESH  NEEDLE LOC RIGHT Right 4/12/2022    US GUIDANCE BREAST BIOPSY RIGHT EACH ADDITIONAL Right 4/12/2022    US GUIDED BREAST BIOPSY RIGHT COMPLETE Right 4/12/2022     Family History   Problem Relation Age of Onset    Breast cancer Mother     Heart disease Mother     Heart disease Father     No Known Problems Sister     No Known Problems Brother     No Known Problems Brother     No Known Problems Brother     Cancer Maternal Grandmother 80        either liver or pancreatic cancer not sure    Arrhythmia Son     No Known Problems Maternal Aunt     Uterine cancer Paternal Aunt 50    Breast cancer Paternal Aunt 52        maybe not sure     Social History     Socioeconomic History    Marital status: /Civil Union     Spouse name: Not on file    Number of children: Not on file    Years of education: Not on file    Highest education level: Not on file   Occupational History    Not on file   Tobacco Use    Smoking status: Never Smoker    Smokeless tobacco: Never Used   Vaping Use    Vaping Use: Never used   Substance and Sexual Activity    Alcohol use: Not Currently     Alcohol/week: 7 0 standard drinks     Types: 7 Glasses of wine per week     Comment: none in the last month    Drug use: Never    Sexual activity: Yes     Partners: Male   Other Topics Concern    Not on file   Social History Narrative    Not on file     Social Determinants of Health     Financial Resource Strain: Not on file   Food Insecurity: Not on file   Transportation Needs: Not on file   Physical Activity: Not on file   Stress: Not on file   Social Connections: Not on file   Intimate Partner Violence: Not on file   Housing Stability: Not on file       Current Outpatient Medications:     cholecalciferol (VITAMIN D3) 1,000 units tablet, Take 1,000 Units by mouth daily, Disp: , Rfl:     Digestive Enzymes (DIGESTIVE ENZYME PO), Take by mouth 3 (three) times a day Taken after meals, Disp: , Rfl:     fluticasone (FLONASE) 50 mcg/act nasal spray, 1 spray into each nostril daily, Disp: 11 1 mL, Rfl: 2    lisinopril (ZESTRIL) 10 mg tablet, Take 10 mg by mouth daily, Disp: , Rfl:     NON FORMULARY, Kyolic, Disp: , Rfl:    Probiotic Product (PROBIOTIC PO), Take by mouth in the morning, Disp: , Rfl:   No Known Allergies    Physical Exam:     Vitals:    05/25/22 1055   BP: (!) 182/118   Pulse: 88   Resp: 16   Temp: 97 9 °F (36 6 °C)   SpO2: 98%     Physical Exam  Constitutional:       Appearance: Normal appearance  HENT:      Head: Normocephalic and atraumatic  Nose: Nose normal       Mouth/Throat:      Mouth: Mucous membranes are moist    Eyes:      Pupils: Pupils are equal, round, and reactive to light  Cardiovascular:      Rate and Rhythm: Normal rate  Pulses: Normal pulses  Heart sounds: Normal heart sounds  Pulmonary:      Effort: Pulmonary effort is normal       Breath sounds: Normal breath sounds  Abdominal:      General: Bowel sounds are normal       Palpations: Abdomen is soft  Musculoskeletal:         General: Normal range of motion  Cervical back: Normal range of motion and neck supple  Skin:     General: Skin is warm  Neurological:      General: No focal deficit present  Mental Status: She is alert and oriented to person, place, and time  Psychiatric:         Mood and Affect: Mood normal          Behavior: Behavior normal          Thought Content: Thought content normal          Judgment: Judgment normal            Results & Discussion:   I did review pathology in the skin detail  she understands and  agrees   All patient questions were answered  Advance Care Planning/Advance Directives: Adalberto Juan MD discussed the disease status with Antony Zaman  today 05/25/22  treatment plans and follow-up with the patient

## 2022-05-25 NOTE — TELEPHONE ENCOUNTER
Please see my message below  Again, I do not agree with this decision however she has the autonomy to make her own decisions  The 10 mg of Lisinopril is not enough as her last recorded BP in the chart was 243/121  Please make it clear to her that the risk of not approiately treating her blood pressure includes heart attack, stroke and possibly death  She can take 5 or 10 mg of the Lisinopril that she has at home, if that is her choice, however it is unlikely to make a substantial change in her BP       Rosana Benavidez [facility]  5/25/2022 8:05 AM

## 2022-05-25 NOTE — TELEPHONE ENCOUNTER
Patient notified of Dr Emmanuel Freitas advisement she states she will  continue with the Lisinopril 10 mg for now if  BP doesn't improve she states she will go right back to taking the 20 mg  She states the Lisinopril 20 mg is too much for her body " I know my body"

## 2022-05-27 ENCOUNTER — TELEPHONE (OUTPATIENT)
Dept: HEMATOLOGY ONCOLOGY | Facility: CLINIC | Age: 67
End: 2022-05-27

## 2022-06-03 ENCOUNTER — TELEPHONE (OUTPATIENT)
Dept: HEMATOLOGY ONCOLOGY | Facility: CLINIC | Age: 67
End: 2022-06-03

## 2022-06-03 ENCOUNTER — CLINICAL SUPPORT (OUTPATIENT)
Dept: RADIATION ONCOLOGY | Facility: CLINIC | Age: 67
End: 2022-06-03
Attending: RADIOLOGY
Payer: MEDICARE

## 2022-06-03 VITALS
BODY MASS INDEX: 26.63 KG/M2 | OXYGEN SATURATION: 98 % | TEMPERATURE: 98.1 F | RESPIRATION RATE: 16 BRPM | HEART RATE: 74 BPM | WEIGHT: 160 LBS | DIASTOLIC BLOOD PRESSURE: 84 MMHG | SYSTOLIC BLOOD PRESSURE: 160 MMHG

## 2022-06-03 DIAGNOSIS — Z17.0 MALIGNANT NEOPLASM OF LOWER-OUTER QUADRANT OF RIGHT BREAST OF FEMALE, ESTROGEN RECEPTOR POSITIVE (HCC): Primary | ICD-10-CM

## 2022-06-03 DIAGNOSIS — C50.511 MALIGNANT NEOPLASM OF LOWER-OUTER QUADRANT OF RIGHT BREAST OF FEMALE, ESTROGEN RECEPTOR POSITIVE (HCC): Primary | ICD-10-CM

## 2022-06-03 PROCEDURE — 99215 OFFICE O/P EST HI 40 MIN: CPT | Performed by: RADIOLOGY

## 2022-06-03 PROCEDURE — 99211 OFF/OP EST MAY X REQ PHY/QHP: CPT | Performed by: RADIOLOGY

## 2022-06-03 NOTE — PROGRESS NOTES
Follow-up - Radiation Oncology   Raquel Leyva 1955 73 y o  female [id number]      History of Present Illness   Cancer Staging  Stage I    Raquel Leyva is a 73y o  year old female recently diagnosed invasive ductal carcinoma, Grade 2, ER(+), NH(-), and HER2(-)   Initial consultation was done 5/5/22 and adjuvant radiation was offered  She underwent right breast shira  directed lumpectomy with sentinel lymph node biopsy on 5/5/22  Pathology confirmed 1 8cm grade 2 tumor associated with grade 2 DCIS  Margins were widely negative by at least 5mm  There was no LVI and tumor was unifocal   0/2 lymph nodes demonstrated metastatic disease  Pathologic stage IA ([id number])  Mammaprint score returned low  She has not scheduled a Medical Oncology consultation as she thought our office would prescribe endocrine therapy        5/18/22 Dr Jeanie Jay  seroma healing well  Follow up 1 week to review path     5/25/22 Dr Jeanie Jay  Follow up 2 weeks  Instructed to wear bra day and night     She states that she generally feel well  She denies new palpable nodules, suspicious skin changes, or nipple discharge of the breasts bilaterally  She denies upper extremity edema bilaterally  She states that localized edema around the surgical sites is declining  She denies discharge  She has some tenderness and notes retraction, but denies significant pain  She notes her symptoms are improving      Upcoming  6/6/22 Dr [place]   Oncology History   Malignant neoplasm of lower-outer quadrant of right breast of female, estrogen receptor positive ([facility] [address] )   4/12/2022 Initial Diagnosis    Malignant neoplasm of lower-outer quadrant of right breast of female, estrogen receptor positive ([facility] [address] )     4/12/2022 Biopsy    Right breast ultrasound guided biopsy  A  8 o'clock, 3 cm from nipple  Invasive mammary carcinoma of no special type (ductal, not otherwise specified)     NH 0  HER2 1+    B  6 o'clock, 5 cm from nipple   Benign breast tissue, no carcinoma identified    Concordant  Malignancy appears unifocal  Mass measures 0 9 cm on ultrasound  Ultrasound of Right axilla shows no suspicious adenopathy  Left breast has no suspicious findings  Shira  clip placed        4/20/2022 Genetic Testing    [employer] ordered  Blood drawn  Patient then refused testing       4/21/2022 Genomic Testing    MammaPrint   Low risk  Luminal type A  MammaPrint index: +0 291      5/5/2022 Surgery    Right breast shira  directed lumpectomy with sentinel lymph node biopsy  Invasive ductal carcinoma  Grade 2  1 3 cm  Margins negative  0/2 lymph nodes           Past Medical History:   Diagnosis Date    Breast cancer ([facility] [address] )     Hyperlipidemia     Hypertension      Past Surgical History:   Procedure Laterality Date    APPENDECTOMY      BREAST BIOPSY Left 2002    benign    BREAST LUMPECTOMY Right 5/5/2022    Procedure: SHIRA  DIRECTED LUMPECTOMY;  Surgeon: HADLEY Law MD;  Location: [facility];  Service: Surgical Oncology    COLONOSCOPY      LYMPH NODE BIOPSY Right 5/5/2022    Procedure: LYMPHATIC MAPPING WITH BLUE AND RADIOACTIVE DYES, SENTINEL LYMPH  NODE BIOPSY, INJECTION IN OR AT 0930 BY DR Mickey De Jesus;  Surgeon: [address], MD;  Location: [facility];  Service: Surgical Oncology    US BREAST SHIRA  NEEDLE LOC RIGHT Right 4/12/2022    US GUIDANCE BREAST BIOPSY RIGHT EACH ADDITIONAL Right 4/12/2022    US GUIDED BREAST BIOPSY RIGHT COMPLETE Right 4/12/2022       Social History   Social History     Substance and Sexual Activity   Alcohol Use Not Currently    Alcohol/week: 7 0 standard drinks    Types: 7 Glasses of wine per week    Comment: none in the last month     Social History     Substance and Sexual Activity   Drug Use Never     Social History     Tobacco Use   Smoking Status Never Smoker   Smokeless Tobacco Never Used         Meds/Allergies     Current Outpatient Medications:    cholecalciferol (VITAMIN D3) 1,000 units tablet, Take 1,000 Units by mouth daily, Disp: , Rfl:     Digestive Enzymes (DIGESTIVE ENZYME PO), Take by mouth 3 (three) times a day Taken after meals, Disp: , Rfl:     lisinopril (ZESTRIL) 10 mg tablet, Take 10 mg by mouth daily, Disp: , Rfl:     NON FORMULARY, Kyolic, Disp: , Rfl:     Probiotic Product (PROBIOTIC PO), Take by mouth in the morning, Disp: , Rfl:     fluticasone (FLONASE) 50 mcg/act nasal spray, 1 spray into each nostril daily (Patient not taking: Reported on 6/3/2022), Disp: 11 1 mL, Rfl: 2  No Known Allergies      Review of Systems   Constitutional: Negative  HENT: Negative  Eyes: Negative  Respiratory: Positive for cough (in the morning)  Cardiovascular: Negative  Gastrointestinal: Negative  Endocrine: Negative  Genitourinary: Positive for frequency  Musculoskeletal: Positive for neck pain (intermittent)  Discomfort right axilla and right breast   Skin:        Surgical incisions are healing   Allergic/Immunologic: Negative  Neurological: Positive for light-headedness  Hematological: Negative  Psychiatric/Behavioral: Negative for sleep disturbance and suicidal ideas  The patient is nervous/anxious       OBJECTIVE:   /84   Pulse 74   Temp 98 1 °F (36 7 °C)   Resp 16   Wt 72 6 kg (160 lb)   SpO2 98%   BMI 26 63 kg/m²   Karnofsky: 90 - Able to carry on normal activity; minor signs or symptoms of disease     Physical Exam  Vitals and nursing note reviewed  Constitutional:       General: She is not in acute distress  Appearance: She is well-developed  Eyes:      General: No scleral icterus  Cardiovascular:      Rate and Rhythm: Normal rate and regular rhythm  Pulmonary:      Breath sounds: No wheezing, rhonchi or rales  Chest:   Breasts:      Right: No axillary adenopathy or supraclavicular adenopathy  Left: No axillary adenopathy or supraclavicular adenopathy          Comments: Breast examination demonstrates right breast is slightly smaller than the left  There is a closed and covered lower outer right breast associated with mild retraction and small seroma  There is a second well-healed right axillary scar  There are no palpable nodules or suspicious skin changes of the breasts bilaterally  Abdominal:      General: There is no distension  Palpations: Abdomen is soft  Tenderness: There is no abdominal tenderness  Musculoskeletal:      Right lower leg: No edema  Left lower leg: No edema  Comments: No upper extremity edema bilaterally  Full range of motion of extremities bilaterally  Lymphadenopathy:      Cervical: No cervical adenopathy  Upper Body:      Right upper body: No supraclavicular or axillary adenopathy  Left upper body: No supraclavicular or axillary adenopathy  Neurological:      Mental Status: She is alert and oriented to person, place, and time  Gait: Gait normal             RESULTS  Imaging Studies:NM sentinal node inj    Result Date: 5/5/2022  Narrative: SENTINEL NODE INJECTION INDICATION:  C50 511: Malignant neoplasm of lower-outer quadrant of right female breast Z17 0: Estrogen receptor positive status (ER+)  FINDINGS: 0 538 mCi Tc-99m filtered sulfur colloid (0 6 cc volume) was administered intradermally into the right breast by Dr Quincy Begum in the OR  No imaging was performed  Impression: Injection of  0 538 mCi Tc-99m sulfur colloid into the right breast in the OR  Workstation performed: [id number]     Mammo shira  breast specimen (No Charge)    Result Date: 5/15/2022  Narrative: Breast surgical specimen radiographs Comments:  Two specimen radiographs from a right breast lumpectomy were obtained  Both specimen images contain the SAVIscout clip as well as the previously placed biopsy clip  These images are not for diagnostic purposes   Impression:  Localization clips (including SAVIscout) included within the specimen radiographs  Pathology   5/5/2022 10:27     Status: Final result     Visible to patient: No (inaccessible in [address])     Dx: Malignant neoplasm of lower-outer ayah        0 Result Notes    Component    Case Report   Surgical Pathology Report                         Case: [id number]                                    Authorizing Provider: Manoj Asif,  Collected:           05/05/2022 1027                                      MD                                                                            Ordering Location:     [facility] Received:            05/05/2022 1234                                      Operating Room                                                                Pathologist:           Abdi Quinones MD                                                                            Specimens:   A) - Axillary lymph node, right axillary sentinel lymph node hot and blue #1                         B) - Axillary lymph node, right axillary sentinel lymph node hot and blue #2                         C) - Breast, Right, right breast additional anterior margin, inked most distal margin                (green)                                                                                              D) - Breast, Right, right breast additional inferior margin, inked most distal margin                (blue)                                                                                               E) - Breast, Right, right breast additional lateral margin, inked most distal margin                 (red)                                                                                                F) - Breast, Right, right breast additional medial margin, inked most distal margin                  (yellow)                                                                                             G) - Breast, Right, right breast additional posterior  margin, inked most distal                     margin (black )                                                                                      H) - Breast, Right, right breast additional superior margin, inked most distal margin                (orange)                                                                                             I) - Breast, Right, right breast shira  directed lumpectomy marked per margin                    protocol                                                                                   Final Diagnosis   A  Axillary lymph node, right axillary sentinel lymph node hot and blue #1:      Single lymph node, negative for carcinoma on multiple H&E levels, (0/1)     B  Axillary lymph node, right axillary sentinel lymph node hot and blue #2:      Single lymph node, negative for carcinoma on multiple H&E levels, (0/1)     C  Breast, right breast additional anterior margin, inked most distal margin (green):      Benign breast tissue; negative for atypical epithelial hyperplasia, in situ and invasive carcinoma     D  Breast, right breast additional inferior margin, inked most distal margin (blue):       Benign breast tissue; negative for atypical epithelial hyperplasia, in situ and invasive carcinoma     E  Breast, right breast additional lateral margin, inked most distal margin (red):       Benign breast tissue; negative for atypical epithelial hyperplasia, in situ and invasive carcinoma     F  Breast, right breast additional medial margin, inked most distal margin (yellow): Benign breast tissue; negative for atypical epithelial hyperplasia, in situ and invasive carcinoma     G  Breast, right breast additional posterior margin, inked most distal margin (black ):       Benign breast tissue; negative for atypical epithelial hyperplasia, in situ and invasive carcinoma     H   Breast, right breast additional superior margin, inked most distal margin (orange):       Benign breast tissue; negative for atypical epithelial hyperplasia, in situ and invasive carcinoma     I  Breast, right breast shira  directed excision marked per margin protocol: Invasive ductal carcinoma (13 mm), [place] histologic grade 2, adjacent to biopsy site      changes ("mass A"; SHIRA  marker)      Focal ductal carcinoma in situ (DCIS), grade 2, cribriform type (1-2 mm expanse)      Negative for lymphovascular space involvement      All margins of resection are negative for invasive carcinoma; invasive carcinoma is 8 mm from the nearest      (posterior) margin      All margins of resection are negative for DCIS; DCIS is > 5 mm from nearest (posterior) margin      Please see Synoptic Tumor Template (below) for additional details      Electronically signed by Mariana Ortiz MD on 5/19/2022 at 2488   Note    - Intradepartmental consultation obtained (Vicky Novak)   Additional Information    All reported additional testing was performed with appropriately reactive controls   These tests were developed and their performance characteristics determined by [facility] or appropriate performing facility, though some tests may be performed on tissues which have not been validated for performance characteristics (such as staining performed on alcohol exposed cell blocks and decalcified tissues)   Results should be interpreted with caution and in the context of the patients clinical condition  These tests may not be cleared or approved by the [place]  Food and Drug Administration, though the FDA has determined that such clearance or approval is not necessary  These tests are used for clinical purposes and they should not be regarded as investigational or for research   This laboratory has been approved by CLIA 88, designated as a high-complexity laboratory and is qualified to perform these tests      Interpretation performed at [address] [address]         Synoptic Checklist     INVASIVE CARCINOMA OF THE BREAST: Resection  8th Edition - Protocol posted: 12/17/2021  INVASIVE CARCINOMA OF THE BREAST: COMPLETE EXCISION - All Specimens  SPECIMEN   Procedure  Excision (less than total mastectomy)    Specimen Laterality  Right    TUMOR   Tumor Site  Clock position      8 o'clock    Histologic Type  Invasive carcinoma of no special type (ductal)    Histologic Grade (Betty Histologic Score)     Glandular (Acinar) / Tubular Differentiation  Score 3    Nuclear Pleomorphism  Score 2    Mitotic Rate  Score 1    Overall Grade  Grade 2 (scores of 6 or 7)    Tumor Size  Greatest dimension of largest invasive focus (Millimeters): 13 mm   Additional Dimension (Millimeters)  10 mm     10 mm   Tumor Focality  Single focus of invasive carcinoma    Ductal Carcinoma In Situ (DCIS)  Present      Negative for extensive intraductal component (EIC)    Size (Extent) of DCIS  Estimated size (extent) of DCIS is at least (Millimeters): 1 - 2 mm   Architectural Patterns  Cribriform    Nuclear Grade  Grade II (intermediate)    Necrosis  Not identified    Lobular Carcinoma In Situ (LCIS)  Not identified    Lymphovascular Invasion  Not identified    Dermal Lymphovascular Invasion  Not identified    Microcalcifications  Present in non-neoplastic tissue    Treatment Effect in the Breast  No known presurgical therapy    MARGINS   Margin Status for Invasive Carcinoma  All margins negative for invasive carcinoma    Distance from Invasive Carcinoma to Closest Margin  8 mm   Closest Margin(s) to Invasive Carcinoma  Posterior    Margin Status for DCIS  All margins negative for DCIS    Distance from DCIS to Closest Margin  Greater than: 5 mm   Closest Margin(s) to DCIS  Posterior    REGIONAL LYMPH NODES   Regional Lymph Node Status  All regional lymph nodes negative for tumor    Total Number of Lymph Nodes Examined (sentinel and non-sentinel)  2    Number of Orr Nodes Examined  2    PATHOLOGIC STAGE CLASSIFICATION (pTNM, AJCC 8th Edition)   Reporting of pT, pN, and (when applicable) pM categories is based on information available to the pathologist at the time the report is issued  As per the AJCC (Chapter 1, 8th Ed ) it is the managing physicians responsibility to establish the final pathologic stage based upon all pertinent information, including but potentially not limited to this pathology report  pT Category  pT1c    Regional Lymph Nodes Modifier  (sn): Orr node(s) evaluated  pN Category  pN0    ADDITIONAL FINDINGS   Additional Findings  Second area with biopsy site changes ("mass B; heart shaped clip) with benign breast tissue    SPECIAL STUDIES        Estrogen Receptor (ER) Status  Positive (greater than 10% of cells demonstrate nuclear positivity)    Percentage of Cells with Nuclear Positivity  %         Progesterone Receptor (PgR) Status  Negative         HER2 (by immunohistochemistry)  Negative (Score 1+)    Testing Performed on Case Number  [id number]    Comment(s)  Ancillary staining patterns returned with [facility] and p63 are consistent with presence of invasive and in situ carcinoma                  Assessment/Plan:  Orders Placed This Encounter   Procedures    Ambulatory referral to Hematology / Sondra Saleh is a 73y o  year old female recently diagnosed with stage IA ([id number]) 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 status post resection achieving widely negative margins  Tumor cells were ER positive, LA negative, and HER2 negative  MammaPrint has returned as low risk  We will help the patient schedule her medical oncology consultation for discussion of endocrine therapy  I recommended adjuvant radiation therapy to the right breast to a total dose of 40Gy with boost to 50Gy    This would be in accordance with NCCN guidelines an effort to improve local regional control and potentially disease-free survival     We discussed alternative treatment options including hormone therapy alone versus accelerated partial breast irradiation  Her clinical presentation would make her an appropriate candidate for APBI based on NINFA guidelines  We discussed that APBI would be performed in 5 fractions and offers improved local control compared to endocrine therapy alone, but is less than whole breast radiation  The absolute difference is small, but significant  Toxicity from APBI is further limited due to smaller area of treatment, but cosmesis is considered equivalent to whole breast radiation  She would also need to meet dosimetric constraints  The rationale and potential benefits, as well as the risks and acute and late side effects and potential toxicities of radiation of whole breast and APBI were discussed with the patient at length  Possible side effects discussed included, but were not limited to: Fatigue, skin erythema, hyperpigmentation, desquamation, fibrosis, shrinkage of the breast resulting asymmetry, rib weakening, pulmonary fibrosis, lymphedema, increased risk for cardiovascular disease  She is undecided, but she is willing to consider APBI radiation  She wished to consider and discuss with Medical Oncology regarding endocrine therapy alone  We will tentatively schedule her for CT simulation in about 3-4 weeks and then proceed if she wishes to proceed with radiation  Leonidas Donaldson MD  6/4/4102,94:60 AM    Portions of the record may have been created with voice recognition software   Occasional wrong word or "sound a like" substitutions may have occurred due to the inherent limitations of voice recognition software   Read the chart carefully and recognize, using context, where substitutions have occurred

## 2022-06-03 NOTE — TELEPHONE ENCOUNTER
Soft Intake Form   Patient Details   Lico Owens     1955     Reason For Appointment   Who is Calling? Physician Office   If not patient, Name? Bobby Nolasco   DID YOU CONFIRM INSURANCE WITH PATIENT? yes   Who is the Referring Doctor? Dr Linda Vizcarra MD   What is the diagnosis? C50 511, Z17 0 (ICD-10-CM) - Malignant neoplasm of lower-outer quadrant of right breast of female, estrogen receptor positive ([facility] [address] )   Has this diagnosis been confirmed by a biopsy/surgery? If yes, what is the date it was done? lumpectomy 5/5/22   Biopsy done at [place] [address]? If not, where was it done? Yes    Was imaging done, and was it done at [facility]? If not, where was it done? no   Have you been seen by another Oncologist?  If so, who and where (name of facility, city and state) Dr Andree Hahn   For 2nd Opinions Only: Are you currently undergoing treatment, or are you scheduled to start treatment? If yes, name of facility, city and state na   For "History Of" only: Have you completed treatment? na   Have you had Genetic Testing done in the past?  If so, advise to bring test results to their visit yes   Record Gathering Information   Did you advise to have records faxed to [phone]? yes   Did you advise to have disks sent to the proper address with imaging? ("History of" Patients)  5 years of imaging for breast patients-Mammos, US etc na   Scheduling Information   Did you send new patient paperwork? Email or mail? Yes emailed   What is the best call back number?    (If the [facility] is calling, please use their number) [phone]   Miscellaneous Information      7/6/22 @ 9:40am

## 2022-06-03 NOTE — TELEPHONE ENCOUNTER
Intake received  Pt has active medicare A & B  Pt also has an active supplemental plan N thru [employer]     Pt is resp for the part B deduct

## 2022-06-03 NOTE — PROGRESS NOTES
Luis Daniel Lawrence 1955 is a 73 y o  female was recently diagnosed invasive ductal carcinoma, Grade 2, ER(+), SD(-), and HER2(-)  She underwent right breast cece  directed lumpectomy with sentinel lymph node biopsy on 5/5/22  She was seen in consult on 5/2/22 to discuss treatment and radiation options prior to resection  She presents today for follow up      5/5/22 Right breast cece  directed lumpectomy with sentinel lymph node biopsy    5/18/22 Dr Haily Bustillo  P/O visit  seroma healing well  Follow up 1 week to review path    5/25/22 Dr Haily Bustillo  Follow up 2 weeks  Instructed to wear bra day and night    Upcoming  6/6/22 Dr Haily Bustillo      Follow up visit     Oncology History   Malignant neoplasm of lower-outer quadrant of right breast of female, estrogen receptor positive ([address] )   4/12/2022 Initial Diagnosis    Malignant neoplasm of lower-outer quadrant of right breast of female, estrogen receptor positive ([address] )     4/12/2022 Biopsy    Right breast ultrasound guided biopsy  A  8 o'clock, 3 cm from nipple  Invasive mammary carcinoma of no special type (ductal, not otherwise specified)     SD 0  HER2 1+    B  6 o'clock, 5 cm from nipple   Benign breast tissue, no carcinoma identified    Concordant  Malignancy appears unifocal  Mass measures 0 9 cm on ultrasound  Ultrasound of Right axilla shows no suspicious adenopathy  Left breast has no suspicious findings  Cece  clip placed  4/20/2022 Genetic Testing    Invitae ordered  Blood drawn  Patient then refused testing       4/21/2022 Genomic Testing    MammaPrint   Low risk  Luminal type A  MammaPrint index: +0 291      5/5/2022 Surgery    Right breast cece  directed lumpectomy with sentinel lymph node biopsy  Invasive ductal carcinoma  Grade 2  1 3 cm  Margins negative  0/2 lymph nodes           Review of Systems:  Review of Systems   Constitutional: Negative  HENT: Negative  Eyes: Negative      Respiratory: Positive for cough (in the morning)  Cardiovascular: Negative  Gastrointestinal: Negative  Endocrine: Negative  Genitourinary: Positive for frequency  Musculoskeletal: Positive for neck pain (intermittent)  Discomfort right axilla and right breast   Skin:        Surgical incisions are healing   Allergic/Immunologic: Negative  Neurological: Positive for light-headedness  Hematological: Negative  Psychiatric/Behavioral: Negative for sleep disturbance and suicidal ideas  The patient is nervous/anxious          Clinical Trial: no        Covid Vaccine Status no    Health Maintenance   Topic Date Due    Hepatitis C Screening  Never done    COVID-19 Vaccine (1) Never done    Pneumococcal Vaccine: 65+ Years (1 - PCV) Never done    Depression Follow-up Plan  Never done    HIV Screening  Never done    DTaP,Tdap,and Td Vaccines (1 - Tdap) Never done    Osteoporosis Screening  Never done    Falls: Plan of Care  Never done    Influenza Vaccine (Season Ended) 09/01/2022    Medicare Annual Wellness Visit (AWV)  01/18/2023    BMI: Followup Plan  01/18/2023    Breast Cancer Screening: Mammogram  02/28/2023    Fall Risk  04/20/2023    Depression Screening  05/02/2023    BMI: Adult  05/25/2023    Colorectal Cancer Screening  01/24/2025    HIB Vaccine  Aged Out    Hepatitis B Vaccine  Aged Out    IPV Vaccine  Aged Out    Hepatitis A Vaccine  Aged Out    Meningococcal ACWY Vaccine  Aged Out    HPV Vaccine  Aged Out     Patient Active Problem List   Diagnosis    Hyperlipidemia    Impaired fasting glucose    Malignant neoplasm of lower-outer quadrant of right breast of female, estrogen receptor positive ([address] )    Primary hypertension    Breast lump or mass     Past Medical History:   Diagnosis Date    Breast cancer ([address] )     Hyperlipidemia     Hypertension      Past Surgical History:   Procedure Laterality Date    APPENDECTOMY      BREAST BIOPSY Left 2002    benign    BREAST LUMPECTOMY Right 5/5/2022 Procedure: SHAILESH  DIRECTED LUMPECTOMY;  Surgeon: Lelon Claude, MD;  [facility];  Service: Surgical Oncology    COLONOSCOPY      LYMPH NODE BIOPSY Right 5/5/2022    Procedure: LYMPHATIC MAPPING WITH BLUE AND RADIOACTIVE DYES, SENTINEL LYMPH  NODE BIOPSY, INJECTION IN OR AT 0930 BY DR Bennett Morales;  Surgeon: Lelon Claude, MD;  [facility];  Service: Surgical Oncology    US BREAST SHAILESH  NEEDLE LOC RIGHT Right 4/12/2022    US GUIDANCE BREAST BIOPSY RIGHT EACH ADDITIONAL Right 4/12/2022    US GUIDED BREAST BIOPSY RIGHT COMPLETE Right 4/12/2022     Family History   Problem Relation Age of Onset    Breast cancer Mother     Heart disease Mother     Heart disease Father     No Known Problems Sister     No Known Problems Brother     No Known Problems Brother     No Known Problems Brother     Cancer Maternal Grandmother 80        either liver or pancreatic cancer not sure    Arrhythmia Son     No Known Problems Maternal Aunt     Uterine cancer Paternal Aunt 50    Breast cancer Paternal Aunt 52        maybe not sure     Social History     Socioeconomic History    Marital status: /Civil Union     Spouse name: Not on file    Number of children: Not on file    Years of education: Not on file    Highest education level: Not on file   Occupational History    Not on file   Tobacco Use    Smoking status: Never Smoker    Smokeless tobacco: Never Used   Vaping Use    Vaping Use: Never used   Substance and Sexual Activity    Alcohol use: Not Currently     Alcohol/week: 7 0 standard drinks     Types: 7 Glasses of wine per week     Comment: none in the last month    Drug use: Never    Sexual activity: Yes     Partners: Male   Other Topics Concern    Not on file   Social History Narrative    Not on file     Social Determinants of Health     Financial Resource Strain: Not on file   Food Insecurity: Not on file   Transportation Needs: Not on file   Physical Activity: Not on file   Stress: Not on file   Social Connections: Not on file   Intimate Partner Violence: Not on file   Housing Stability: Not on file       Current Outpatient Medications:     cholecalciferol (VITAMIN D3) 1,000 units tablet, Take 1,000 Units by mouth daily, Disp: , Rfl:     Digestive Enzymes (DIGESTIVE ENZYME PO), Take by mouth 3 (three) times a day Taken after meals, Disp: , Rfl:     fluticasone (FLONASE) 50 mcg/act nasal spray, 1 spray into each nostril daily, Disp: 11 1 mL, Rfl: 2    lisinopril (ZESTRIL) 10 mg tablet, Take 10 mg by mouth daily, Disp: , Rfl:     NON FORMULARY, Kyolic, Disp: , Rfl:     Probiotic Product (PROBIOTIC PO), Take by mouth in the morning, Disp: , Rfl:   No Known Allergies  There were no vitals filed for this visit

## 2022-06-06 ENCOUNTER — OFFICE VISIT (OUTPATIENT)
Dept: SURGICAL ONCOLOGY | Facility: CLINIC | Age: 67
End: 2022-06-06

## 2022-06-06 ENCOUNTER — DOCUMENTATION (OUTPATIENT)
Dept: HEMATOLOGY ONCOLOGY | Facility: CLINIC | Age: 67
End: 2022-06-06

## 2022-06-06 VITALS
HEART RATE: 78 BPM | DIASTOLIC BLOOD PRESSURE: 92 MMHG | TEMPERATURE: 97.9 F | BODY MASS INDEX: 25.99 KG/M2 | WEIGHT: 156 LBS | HEIGHT: 65 IN | OXYGEN SATURATION: 100 % | SYSTOLIC BLOOD PRESSURE: 178 MMHG

## 2022-06-06 DIAGNOSIS — Z98.890 STATUS POST RIGHT BREAST LUMPECTOMY: ICD-10-CM

## 2022-06-06 DIAGNOSIS — C50.511 MALIGNANT NEOPLASM OF LOWER-OUTER QUADRANT OF RIGHT BREAST OF FEMALE, ESTROGEN RECEPTOR POSITIVE (HCC): Primary | ICD-10-CM

## 2022-06-06 DIAGNOSIS — Z17.0 MALIGNANT NEOPLASM OF LOWER-OUTER QUADRANT OF RIGHT BREAST OF FEMALE, ESTROGEN RECEPTOR POSITIVE (HCC): Primary | ICD-10-CM

## 2022-06-06 PROCEDURE — 99024 POSTOP FOLLOW-UP VISIT: CPT | Performed by: SURGERY

## 2022-06-06 NOTE — PROGRESS NOTES
Surgical Oncology Follow Up  [facility]  [facility] [place] [id number]    Emilia Mccain  1955  [id number]      Chief Complaint   Patient presents with    Follow-up        Assessment & Plan:   Follow-up after right breast lumpectomy and sentinel lymph node biopsy she  Well-healed surgical site  No evidence of surgical site infection  She has been referred to Medical and Radiation oncologist   I will see her in 3 months time she was told to call me with any questions or concern in the interim she understand agree  Cancer History:     Oncology History   Malignant neoplasm of lower-outer quadrant of right breast of female, estrogen receptor positive ([facility] [address] )   4/12/2022 Initial Diagnosis    Malignant neoplasm of lower-outer quadrant of right breast of female, estrogen receptor positive ([facility] [address] )     4/12/2022 Biopsy    Right breast ultrasound guided biopsy  A  8 o'clock, 3 cm from nipple  Invasive mammary carcinoma of no special type (ductal, not otherwise specified)     MN 0  HER2 1+    B  6 o'clock, 5 cm from nipple   Benign breast tissue, no carcinoma identified    Concordant  Malignancy appears unifocal  Mass measures 0 9 cm on ultrasound  Ultrasound of Right axilla shows no suspicious adenopathy  Left breast has no suspicious findings  Cece  clip placed  4/20/2022 Genetic Testing    Invitae ordered  Blood drawn  Patient then refused testing       4/21/2022 Genomic Testing    MammaPrint   Low risk  Luminal type A  MammaPrint index: +0 291      5/5/2022 Surgery    Right breast cece  directed lumpectomy with sentinel lymph node biopsy  Invasive ductal carcinoma  Grade 2  1 3 cm  Margins negative  0/2 lymph nodes             Interval History:   Follow-up with right breast cancer  Review of Systems:   Review of Systems   Constitutional: Negative for chills and fever     HENT: Negative for ear pain and sore throat  Eyes: Negative for pain and visual disturbance  Respiratory: Negative for cough and shortness of breath  Cardiovascular: Negative for chest pain and palpitations  Gastrointestinal: Negative for abdominal pain and vomiting  Genitourinary: Negative for dysuria and hematuria  Musculoskeletal: Negative for arthralgias and back pain  Skin: Negative for color change and rash  Neurological: Negative for seizures and syncope  All other systems reviewed and are negative        Past Medical History     Patient Active Problem List   Diagnosis    Hyperlipidemia    Impaired fasting glucose    Malignant neoplasm of lower-outer quadrant of right breast of female, estrogen receptor positive ([facility] )    Primary hypertension    Breast lump or mass     Past Medical History:   Diagnosis Date    Breast cancer ([facility] )     Hyperlipidemia     Hypertension      Past Surgical History:   Procedure Laterality Date    APPENDECTOMY      BREAST BIOPSY Left 2002    benign    BREAST LUMPECTOMY Right 5/5/2022    Procedure: SHAILESH  DIRECTED LUMPECTOMY;  Surgeon: Sophie Ochoa MD;  [facility];  Service: Surgical Oncology    COLONOSCOPY      LYMPH NODE BIOPSY Right 5/5/2022    Procedure: LYMPHATIC MAPPING WITH BLUE AND RADIOACTIVE DYES, SENTINEL LYMPH  NODE BIOPSY, INJECTION IN OR AT 0930 BY DR Radha Rhodes;  Surgeon: Sophie Ochoa MD;  [facility];  Service: Surgical Oncology    US BREAST SHAILESH  NEEDLE LOC RIGHT Right 4/12/2022    US GUIDANCE BREAST BIOPSY RIGHT EACH ADDITIONAL Right 4/12/2022    US GUIDED BREAST BIOPSY RIGHT COMPLETE Right 4/12/2022     Family History   Problem Relation Age of Onset    Breast cancer Mother     Heart disease Mother     Heart disease Father     No Known Problems Sister     No Known Problems Brother     No Known Problems Brother     No Known Problems Brother     Cancer Maternal Grandmother 80        either liver or pancreatic cancer not sure    Arrhythmia Son     No Known Problems Maternal Aunt     Uterine cancer Paternal Aunt 50    Breast cancer Paternal Aunt 52        maybe not sure     Social History     Socioeconomic History    Marital status: /Civil Union     Spouse name: Not on file    Number of children: Not on file    Years of education: Not on file    Highest education level: Not on file   Occupational History    Not on file   Tobacco Use    Smoking status: Never Smoker    Smokeless tobacco: Never Used   Vaping Use    Vaping Use: Never used   Substance and Sexual Activity    Alcohol use: Not Currently     Alcohol/week: 7 0 standard drinks     Types: 7 Glasses of wine per week     Comment: none in the last month    Drug use: Never    Sexual activity: Yes     Partners: Male   Other Topics Concern    Not on file   Social History Narrative    Not on file     Social Determinants of Health     Financial Resource Strain: Not on file   Food Insecurity: Not on file   Transportation Needs: Not on file   Physical Activity: Not on file   Stress: Not on file   Social Connections: Not on file   Intimate Partner Violence: Not on file   Housing Stability: Not on file       Current Outpatient Medications:     cholecalciferol (VITAMIN D3) 1,000 units tablet, Take 1,000 Units by mouth daily, Disp: , Rfl:     Digestive Enzymes (DIGESTIVE ENZYME PO), Take by mouth 3 (three) times a day Taken after meals, Disp: , Rfl:     lisinopril (ZESTRIL) 10 mg tablet, Take 10 mg by mouth daily, Disp: , Rfl:     NON FORMULARY, Kyolic, Disp: , Rfl:     Probiotic Product (PROBIOTIC PO), Take by mouth in the morning, Disp: , Rfl:     fluticasone (FLONASE) 50 mcg/act nasal spray, 1 spray into each nostril daily (Patient not taking: No sig reported), Disp: 11 1 mL, Rfl: 2  No Known Allergies    Physical Exam:     Vitals:    06/06/22 1000   BP: (!) 178/92   Pulse: 78   Temp: 97 9 °F (36 6 °C)   SpO2: 100%     Physical Exam  Constitutional: Appearance: Normal appearance  HENT:      Head: Normocephalic and atraumatic  Nose: Nose normal       Mouth/Throat:      Mouth: Mucous membranes are moist    Eyes:      Pupils: Pupils are equal, round, and reactive to light  Cardiovascular:      Rate and Rhythm: Normal rate  Pulses: Normal pulses  Heart sounds: Normal heart sounds  Pulmonary:      Effort: Pulmonary effort is normal       Breath sounds: Normal breath sounds  Chest:          Comments: I had breast incision healed no suspicious mass or masses  Left breast examination is unremarkable  Abdominal:      General: Bowel sounds are normal       Palpations: Abdomen is soft  Musculoskeletal:         General: Normal range of motion  Cervical back: Normal range of motion and neck supple  Skin:     General: Skin is warm  Neurological:      General: No focal deficit present  Mental Status: She is alert and oriented to person, place, and time  Psychiatric:         Mood and Affect: Mood normal          Behavior: Behavior normal          Thought Content: Thought content normal          Judgment: Judgment normal            Results & Discussion:   I did review further adjuvant management including radiation for local control and endocrine therapy  She knows these are important and she will follow and discuss with Medical and Radiation oncologist  she understands and  agrees   All patient questions were answered  Advance Care Planning/Advance Directives: Polina Maier MD discussed the disease status with Marlen Hays  today 06/06/22  treatment plans and follow-up with the patient

## 2022-06-06 NOTE — PROGRESS NOTES
Received in-basket message from Rad/Onc asking to move pts Hem/Onc appt to a sooner date  No sooner consult appts available in [facility]  [facility]  I called patient to see if [facility] would be ok   No answer, VM message left with my contact information requesting a call back

## 2022-06-07 ENCOUNTER — DOCUMENTATION (OUTPATIENT)
Dept: HEMATOLOGY ONCOLOGY | Facility: CLINIC | Age: 67
End: 2022-06-07

## 2022-06-08 ENCOUNTER — TELEPHONE (OUTPATIENT)
Dept: HEMATOLOGY ONCOLOGY | Facility: CLINIC | Age: 67
End: 2022-06-08

## 2022-06-13 ENCOUNTER — TELEPHONE (OUTPATIENT)
Dept: HEMATOLOGY ONCOLOGY | Facility: CLINIC | Age: 67
End: 2022-06-13

## 2022-06-13 ENCOUNTER — CONSULT (OUTPATIENT)
Dept: HEMATOLOGY ONCOLOGY | Facility: CLINIC | Age: 67
End: 2022-06-13
Payer: MEDICARE

## 2022-06-13 VITALS
RESPIRATION RATE: 16 BRPM | SYSTOLIC BLOOD PRESSURE: 150 MMHG | BODY MASS INDEX: 26.16 KG/M2 | HEIGHT: 65 IN | WEIGHT: 157 LBS | HEART RATE: 89 BPM | OXYGEN SATURATION: 98 % | DIASTOLIC BLOOD PRESSURE: 88 MMHG | TEMPERATURE: 96.8 F

## 2022-06-13 DIAGNOSIS — Z17.0 MALIGNANT NEOPLASM OF LOWER-OUTER QUADRANT OF RIGHT BREAST OF FEMALE, ESTROGEN RECEPTOR POSITIVE (HCC): ICD-10-CM

## 2022-06-13 DIAGNOSIS — C50.511 MALIGNANT NEOPLASM OF LOWER-OUTER QUADRANT OF RIGHT BREAST OF FEMALE, ESTROGEN RECEPTOR POSITIVE (HCC): ICD-10-CM

## 2022-06-13 PROCEDURE — 99205 OFFICE O/P NEW HI 60 MIN: CPT | Performed by: INTERNAL MEDICINE

## 2022-06-13 RX ORDER — LISINOPRIL 20 MG/1
20 TABLET ORAL DAILY
COMMUNITY

## 2022-06-13 NOTE — PROGRESS NOTES
[facility] - Hematology & Medical Oncology  Outpatient Visit Encounter Note    Junito Amezquita 73 y o  female [id number] Date:  6/13/2022    ONCOLOGY HISTORY        Oncology History   Malignant neoplasm of lower-outer quadrant of right breast of female, estrogen receptor positive ([facility] [facility] )   4/12/2022 Initial Diagnosis    Malignant neoplasm of lower-outer quadrant of right breast of female, estrogen receptor positive ([facility] [address] [facility] )     4/12/2022 Biopsy    Right breast ultrasound guided biopsy  A  8 o'clock, 3 cm from nipple  Invasive mammary carcinoma of no special type (ductal, not otherwise specified)     MD 0  HER2 1+    B  6 o'clock, 5 cm from nipple   Benign breast tissue, no carcinoma identified    Concordant  Malignancy appears unifocal  Mass measures 0 9 cm on ultrasound  Ultrasound of Right axilla shows no suspicious adenopathy  Left breast has no suspicious findings  Cece  clip placed  4/20/2022 Genetic Testing    Invitae ordered  Blood drawn  Patient then refused testing       4/21/2022 Genomic Testing    MammaPrint   Low risk  Luminal type A  MammaPrint index: +0 291      5/5/2022 Surgery    Right breast cece  directed lumpectomy with sentinel lymph node biopsy  Invasive ductal carcinoma  Grade 2  1 3 cm  Margins negative  0/2 lymph nodes             SUBJECTIVE      Junito Amezquita is a 73 y o  here for new consultation with me today  The patient is referred by Chris Tan MD and the reason for consultation is C50 511, Z17 0 (ICD-10-CM) - Malignant neoplasm of lower-outer quadrant of right breast of female, estrogen receptor positive ([facility] [facility] )  In review of the chart and talking with the patient, she was diagnosed with estrogen positive G2 invasive ductal carcinoma in April 2022  This was a 1 8cm mass with low MammaPrint score  She underwent lumpectomy and has not decided on adjuvant radiation therapy  She has no acute complaints for me   Denies any f/c/n/v/cp/ap/sob/cough  I have reviewed the relevant past medical, surgical, social and family history  I have also reviewed allergies and medications for this patient  Review of Systems  Review of Systems   All other systems reviewed and are negative  OBJECTIVE     Physical Exam  Vitals:    06/13/22 1558   BP: 150/88   BP Location: Left arm   Patient Position: Sitting   Cuff Size: Adult   Pulse: 89   Resp: 16   Temp: (!) 96 8 °F (36 °C)   TempSrc: Temporal   SpO2: 98%   Weight: 71 2 kg (157 lb)   Height: 5' 5" (1 651 m)       Physical Exam  Vitals reviewed  Constitutional:       General: She is not in acute distress  Appearance: She is not ill-appearing, toxic-appearing or diaphoretic  HENT:      Head: Normocephalic and atraumatic  Eyes:      General: No scleral icterus  Extraocular Movements: Extraocular movements intact  Cardiovascular:      Rate and Rhythm: Normal rate  Pulmonary:      Effort: Pulmonary effort is normal  No respiratory distress  Abdominal:      General: There is no distension  Musculoskeletal:         General: Normal range of motion  Cervical back: Normal range of motion and neck supple  Neurological:      General: No focal deficit present  Mental Status: She is alert and oriented to person, place, and time  Gait: Gait normal           Imaging  Relevant imaging reviewed in chart    Labs  Relevant labs reviewed in chart   ASSESSMENT & PLAN      Diagnosis ICD-10-CM Associated Orders   1  Malignant neoplasm of lower-outer quadrant of right breast of female, estrogen receptor positive ([facility] [address] )  C58 [id number] Ambulatory referral to Hematology / Oncology    Z17 0        73 y o  post-menopausal female diagnosed with pT1cN0 G2 invasive ductal carcinoma of the right breast in April 2022  She is s/p lumpectomy with SLNB         Oncology history updated accordingly this visit   Goals of care/patient communication  o I reviewed her cancer history thus far and recommend adjuvant endocrine therapy with Letrozole for at least 5 years  o Given her low MammaPrint assay, there is no indication for adjuvant chemotherapy   o I reviewed the MammaPrint results with her and the listed data    o She told me that she is unsure about what she prefers to do   o In understanding and addressing her questions and concerns, I stated the benefits outweigh the risks of endocrine therapy and hence its recommended as standard of care  o During my office visit with the patient, I outlined and explained the reasoning behind the medical recommendation including risks/benefits associated with them  Similarly, I also explained the potential risk/outcomes for not choosing the recommendation  The intent is to provide the patient all information for them to make the best informed decision for them  Follow Up   She does not prefer to have a scheduled visit  She will contact the office if she wishes to undergo endocrine therapy  Hence, we await for her communication  All questions were answered to the patient's satisfaction during this encounter  They appreciated and thanked me for spending time with them  The patient knows the contact information for our office and know to reach out for any relevant concerns related to this encounter  For all other listed problems and medical diagnosis in his chart - they are managed by PCP and/or other specialists, which patient acknowledges  Eelna Barker MD  Hematology & Medical Oncology

## 2022-06-13 NOTE — TELEPHONE ENCOUNTER
Sanket Pereira from [address] [place] [address] [place] Contreras is calling to inform Dr Ulices Ocampo patient is pulling into parking lot for her appt today @ 4pm

## 2022-06-14 PROBLEM — Z80.3 FAMILY HISTORY OF BREAST CANCER: Status: ACTIVE | Noted: 2022-06-14

## 2022-06-15 ENCOUNTER — OFFICE VISIT (OUTPATIENT)
Dept: SURGICAL ONCOLOGY | Facility: CLINIC | Age: 67
End: 2022-06-15

## 2022-06-15 VITALS
HEIGHT: 65 IN | DIASTOLIC BLOOD PRESSURE: 104 MMHG | TEMPERATURE: 98.2 F | BODY MASS INDEX: 26.33 KG/M2 | SYSTOLIC BLOOD PRESSURE: 202 MMHG | HEART RATE: 83 BPM | WEIGHT: 158 LBS | OXYGEN SATURATION: 98 % | RESPIRATION RATE: 18 BRPM

## 2022-06-15 DIAGNOSIS — Z17.0 MALIGNANT NEOPLASM OF LOWER-OUTER QUADRANT OF RIGHT BREAST OF FEMALE, ESTROGEN RECEPTOR POSITIVE (HCC): Primary | ICD-10-CM

## 2022-06-15 DIAGNOSIS — Z80.3 FAMILY HISTORY OF BREAST CANCER: ICD-10-CM

## 2022-06-15 DIAGNOSIS — C50.511 MALIGNANT NEOPLASM OF LOWER-OUTER QUADRANT OF RIGHT BREAST OF FEMALE, ESTROGEN RECEPTOR POSITIVE (HCC): Primary | ICD-10-CM

## 2022-06-15 PROCEDURE — 99024 POSTOP FOLLOW-UP VISIT: CPT | Performed by: SURGERY

## 2022-06-15 NOTE — PROGRESS NOTES
Surgical Oncology Follow Up  [address]  [facility]  [facility] [place] [address]    Hugh Hillman  1955  [id number]      Chief Complaint   Patient presents with    Follow-up        Assessment & Plan:   She is here for follow-up with right breast cancer  She is doing well  She is concerned about mild swelling but there is no surgical site infection  She may have a small seroma  We aspirated under strict sterile conditions 2 cc of clear yellow fluid  We will see her in 3 months' time  She has been instructed again to have a conversation with medical and radiation oncologist     Cancer History:     Oncology History   Malignant neoplasm of lower-outer quadrant of right breast of female, estrogen receptor positive ([facility] [address] )   4/12/2022 Initial Diagnosis    Malignant neoplasm of lower-outer quadrant of right breast of female, estrogen receptor positive ([facility] [address] )     4/12/2022 Biopsy    Right breast ultrasound guided biopsy  A  8 o'clock, 3 cm from nipple  Invasive mammary carcinoma of no special type (ductal, not otherwise specified)     RI 0  HER2 1+    B  6 o'clock, 5 cm from nipple   Benign breast tissue, no carcinoma identified    Concordant  Malignancy appears unifocal  Mass measures 0 9 cm on ultrasound  Ultrasound of Right axilla shows no suspicious adenopathy  Left breast has no suspicious findings  Shira  clip placed  4/20/2022 Genetic Testing    Invitae ordered  Blood drawn  Patient then refused testing       4/21/2022 Genomic Testing    MammaPrint   Low risk  Luminal type A  MammaPrint index: +0 291      5/5/2022 Surgery    Right breast shira  directed lumpectomy with sentinel lymph node biopsy  Invasive ductal carcinoma  Grade 2  1 3 cm  Margins negative  0/2 lymph nodes             Interval History:   Follow-up with right breast cancer      Review of Systems:   Review of Systems   Constitutional: Negative for chills and fever  HENT: Negative for ear pain and sore throat  Eyes: Negative for pain and visual disturbance  Respiratory: Negative for cough and shortness of breath  Cardiovascular: Negative for chest pain and palpitations  Gastrointestinal: Negative for abdominal pain and vomiting  Genitourinary: Negative for dysuria and hematuria  Musculoskeletal: Negative for arthralgias and back pain  Skin: Negative for color change and rash  Neurological: Negative for seizures and syncope  All other systems reviewed and are negative        Past Medical History     Patient Active Problem List   Diagnosis    Hyperlipidemia    Impaired fasting glucose    Malignant [facility] of [facility]    Primary hypertension    Breast lump or mass    Family history of breast cancer     Past Medical History:   Diagnosis Date    [facility] ([facility] )     Hyperlipidemia     Hypertension      Past Surgical History:   Procedure Laterality Date    APPENDECTOMY      BREAST BIOPSY Left 2002    benign    BREAST LUMPECTOMY Right 5/5/2022    Procedure: SHAILESH  DIRECTED LUMPECTOMY;  Surgeon: Atif Noble MD;  [facility]: [facility];  Service: Surgical Oncology    COLONOSCOPY      LYMPH NODE BIOPSY Right 5/5/2022    Procedure: LYMPHATIC MAPPING WITH BLUE AND RADIOACTIVE DYES, SENTINEL LYMPH  NODE BIOPSY, INJECTION IN OR AT 0930 BY DR Wesly Raymond;  Surgeon: Atif Noble MD;  [facility]: [facility];  Service: Surgical Oncology    US BREAST SHAILESH  NEEDLE LOC RIGHT Right 4/12/2022    US GUIDANCE BREAST BIOPSY RIGHT EACH ADDITIONAL Right 4/12/2022    US GUIDED BREAST BIOPSY RIGHT COMPLETE Right 4/12/2022     Family History   Problem Relation Age of Onset    Breast cancer Mother     Heart disease Mother     Heart disease Father     No Known Problems Sister     No Known Problems Brother     No Known Problems Brother     No Known Problems Brother     Cancer Maternal Grandmother 80        either liver or pancreatic cancer not sure    Arrhythmia Son     No Known Problems Maternal Aunt     Uterine cancer Paternal Aunt 50    Breast cancer Paternal Aunt 52        maybe not sure     Social History     Socioeconomic History    Marital status: /Civil Union     Spouse name: Not on file    Number of children: Not on file    Years of education: Not on file    Highest education level: Not on file   Occupational History    Not on file   Tobacco Use    Smoking status: Never Smoker    Smokeless tobacco: Never Used   Vaping Use    Vaping Use: Never used   Substance and Sexual Activity    Alcohol use: Not Currently     Alcohol/week: 7 0 standard drinks     Types: 7 Glasses of wine per week     Comment: none in the last month    Drug use: Never    Sexual activity: Yes     Partners: Male   Other Topics Concern    Not on file   Social History Narrative    Not on file     Social Determinants of Health     Financial Resource Strain: Not on file   Food Insecurity: Not on file   Transportation Needs: Not on file   Physical Activity: Not on file   Stress: Not on file   Social Connections: Not on file   Intimate Partner Violence: Not on file   Housing Stability: Not on file       Current Outpatient Medications:     cholecalciferol (VITAMIN D3) 1,000 units tablet, Take 1,000 Units by mouth daily, Disp: , Rfl:     Digestive Enzymes (DIGESTIVE ENZYME PO), Take by mouth 3 (three) times a day Taken after meals, Disp: , Rfl:     lisinopril (ZESTRIL) 20 mg tablet, Take 20 mg by mouth daily, Disp: , Rfl:     NON FORMULARY, Kyolic, Disp: , Rfl:     Probiotic Product (PROBIOTIC PO), Take by mouth in the morning, Disp: , Rfl:     QUERCETIN PO, Take by mouth 3 (three) times a week, Disp: , Rfl:   No Known Allergies    Physical Exam:     Vitals:    06/15/22 0900   BP: (!) 202/104   Pulse: 83   Resp: 18   Temp: 98 2 °F (36 8 °C)   SpO2: 98% Physical Exam  Constitutional:       Appearance: Normal appearance  HENT:      Head: Normocephalic and atraumatic  Nose: Nose normal       Mouth/Throat:      Mouth: Mucous membranes are moist    Eyes:      Pupils: Pupils are equal, round, and reactive to light  Cardiovascular:      Rate and Rhythm: Normal rate  Pulses: Normal pulses  Heart sounds: Normal heart sounds  Pulmonary:      Effort: Pulmonary effort is normal       Breath sounds: Normal breath sounds  Chest:          Comments: The bilateral Breast(s) were examined  There was not any sign of an inverted nipple, mass, nipple discharge, skin changes or tenderness  The bilateral  breast(s) were examined in the sitting and supine position  There are not any worrisome skin changes, tenderness, nipple changes, swelling ,bleeding or evidence of mass/s in all four quadrants  Tonia survey demonstrated that there is not any evidence of any clinically suspicious axillary, pectoral or supraclavicular lymph nodes  Right breast and axillary incisions  Abdominal:      General: Bowel sounds are normal       Palpations: Abdomen is soft  Musculoskeletal:         General: Normal range of motion  Cervical back: Normal range of motion and neck supple  Skin:     General: Skin is warm  Neurological:      General: No focal deficit present  Mental Status: She is alert and oriented to person, place, and time  Psychiatric:         Mood and Affect: Mood normal          Behavior: Behavior normal          Thought Content: Thought content normal          Judgment: Judgment normal            Results & Discussion:   I did discuss with need for adjuvant radiation  She has an appointment with Radiation oncologist   I also emphasized she will benefit from endocrine therapy  She had an appointment with medical oncologist she promised to revisit that with medical oncologist   I will see him in 3 months' time    She was told to call us with any questions or concerns in the interim she understanding and agreed to do    Advance Care Planning/Advance Directives: Rony Snyder MD discussed the disease status with Inez durant 06/15/22  treatment plans and follow-up with the patient

## 2022-06-21 ENCOUNTER — DOCUMENTATION (OUTPATIENT)
Dept: HEMATOLOGY ONCOLOGY | Facility: CLINIC | Age: 67
End: 2022-06-21

## 2022-06-21 NOTE — PROGRESS NOTES
Called to check in with patient  As of now, pt states she isn't interested in starting endocrine therapy  Pt is looking forward to speaking with Dr Tushar Nuno and to see how radiation could benefit her  Pt was appreciative of outreach and encouraged to call us

## 2022-07-12 ENCOUNTER — TELEPHONE (OUTPATIENT)
Dept: RADIATION ONCOLOGY | Facility: CLINIC | Age: 67
End: 2022-07-12

## 2022-07-12 NOTE — TELEPHONE ENCOUNTER
RAD ONC-   Received a call from [facility] this morning  She is scheduled for a clinical visit with Dr Eugene Pineda 7/13 prior to a tentative RT  Breast SIM  [facility] cancelled her appointment for tomorrow and declined to reschedule at this time  She will be going away until August   She stated that she still has not made any treatment decisions and will contact RAD ONC when she returns from her vacation   KD

## 2022-08-03 ENCOUNTER — PATIENT OUTREACH (OUTPATIENT)
Dept: HEMATOLOGY ONCOLOGY | Facility: CLINIC | Age: 67
End: 2022-08-03

## 2022-08-03 NOTE — PROGRESS NOTES
Breast Oncology Nurse Navigator    Contacted patient to check in and try to get her scheduled with radiation oncology to initiate treatment  Left a detailed voicemail with the reason for my call and left my phone number, requesting a call back

## 2022-08-17 ENCOUNTER — PATIENT OUTREACH (OUTPATIENT)
Dept: HEMATOLOGY ONCOLOGY | Facility: CLINIC | Age: 67
End: 2022-08-17

## 2022-08-17 PROBLEM — N64.52 NIPPLE DISCHARGE IN FEMALE: Status: ACTIVE | Noted: 2022-08-17

## 2022-08-17 NOTE — PROGRESS NOTES
Breast Oncology Nurse Navigator    Patient called in  She first stated she is still healing from her breast surgery three months ago, stating lumpectomy site feels tender to the touch  She then said when she had her mammogram back in February 2022 she said "there was something seen on my left breast "  She said she asked for an ultrasound of the left breast when a ultrasound was performed on the right breast   She was told to follow-up in one year for the left breast   She now says that she notices small amounts of clear discharge from the left nipple  States it comes out on her own  She is asking for a radiology to look at her mammogram from February  Patient states she is anxious and nervous  Will pass this message along to Dr Jassi Juares staff  Patient states she called Dr Jassi Juares nurse and left a message  Made her aware that her next appointment with Dr Sorin Scales is 9/8/22  Patient states she was unaware of this appointment    I asked patient about going for radiation to the breast   Patient states, "I'm not doing radiation "

## 2022-08-18 ENCOUNTER — OFFICE VISIT (OUTPATIENT)
Dept: SURGICAL ONCOLOGY | Facility: CLINIC | Age: 67
End: 2022-08-18
Payer: MEDICARE

## 2022-08-18 VITALS
OXYGEN SATURATION: 96 % | BODY MASS INDEX: 26.33 KG/M2 | HEIGHT: 65 IN | WEIGHT: 158 LBS | HEART RATE: 89 BPM | TEMPERATURE: 99.6 F | RESPIRATION RATE: 16 BRPM

## 2022-08-18 DIAGNOSIS — Z80.3 FAMILY HISTORY OF BREAST CANCER: ICD-10-CM

## 2022-08-18 DIAGNOSIS — Z17.0 MALIGNANT NEOPLASM OF LOWER-OUTER QUADRANT OF RIGHT BREAST OF FEMALE, ESTROGEN RECEPTOR POSITIVE (HCC): Primary | ICD-10-CM

## 2022-08-18 DIAGNOSIS — N64.52 NIPPLE DISCHARGE IN FEMALE: ICD-10-CM

## 2022-08-18 DIAGNOSIS — C50.511 MALIGNANT NEOPLASM OF LOWER-OUTER QUADRANT OF RIGHT BREAST OF FEMALE, ESTROGEN RECEPTOR POSITIVE (HCC): Primary | ICD-10-CM

## 2022-08-18 PROCEDURE — 99214 OFFICE O/P EST MOD 30 MIN: CPT | Performed by: SURGERY

## 2022-08-18 NOTE — PROGRESS NOTES
Surgical Oncology Follow Up  [facility]  [facility] [place] [address]    Iwona Box  1955  [id number]      Chief Complaint   New left breast discharge  Assessment & Plan: This is a patient with right breast invasive ductal carcinoma status post breast conservation surgery  She was referred to Medical and Radiation oncologist for adjuvant treatment however patient has had conversation and decided not to continue  But today she is coming with a new finding in her opposite breast occasional clear to bloody nipple discharge  She denies of any breast pain  I have extensively discussed with her need for adjuvant radiation she finally agreed to have a conversation with Dr Guyann Castleman again  We will also refer her back to medical oncologist to discuss the endocrine therapy  We will obtain left breast ultrasound and diagnostic mammogram and follow her  Cancer History:     Oncology History   Malignant neoplasm of lower-outer quadrant of right breast of female, estrogen receptor positive ([facility] [address] )   4/12/2022 Initial Diagnosis    Malignant neoplasm of lower-outer quadrant of right breast of female, estrogen receptor positive ([facility] [address] )     4/12/2022 Biopsy    Right breast ultrasound guided biopsy  A  8 o'clock, 3 cm from nipple  Invasive mammary carcinoma of no special type (ductal, not otherwise specified)     TX 0  HER2 1+    B  6 o'clock, 5 cm from nipple   Benign breast tissue, no carcinoma identified    Concordant  Malignancy appears unifocal  Mass measures 0 9 cm on ultrasound  Ultrasound of Right axilla shows no suspicious adenopathy  Left breast has no suspicious findings  Cece  clip placed        4/20/2022 Genetic Testing    Invitae ordered  Blood drawn  Patient then refused testing       4/21/2022 Genomic Testing    MammaPrint   Low risk  Luminal type A  MammaPrint index: +0 291      5/5/2022 Surgery    Right breast shira  directed lumpectomy with sentinel lymph node biopsy  Invasive ductal carcinoma  Grade 2  1 3 cm  Margins negative  0/2 lymph nodes             Interval History:   New contralateral breast nipple discharge  Review of Systems:   Review of Systems   Constitutional: Negative for chills and fever  HENT: Negative for ear pain and sore throat  Eyes: Negative for pain and visual disturbance  Respiratory: Negative for cough and shortness of breath  Cardiovascular: Negative for chest pain and palpitations  Gastrointestinal: Negative for abdominal pain and vomiting  Genitourinary: Negative for dysuria and hematuria  Musculoskeletal: Negative for arthralgias and back pain  Skin: Negative for color change and rash  Neurological: Negative for seizures and syncope  All other systems reviewed and are negative        Past Medical History     Patient Active Problem List   Diagnosis    Hyperlipidemia    Impaired fasting glucose    Malignant neoplasm of lower-outer quadrant of right breast of female, estrogen receptor positive ([facility] [address] )    Primary hypertension    Breast lump or mass    Family history of breast cancer    Nipple discharge in female     Past Medical History:   Diagnosis Date    [facility] ([facility] )     Hyperlipidemia     Hypertension      Past Surgical History:   Procedure Laterality Date    APPENDECTOMY      BREAST BIOPSY Left 2002    benign    BREAST LUMPECTOMY Right 5/5/2022    Procedure: SHIRA  DIRECTED LUMPECTOMY;  Surgeon: Errol Schmidt MD;  [facility]: [facility];  Service: Surgical Oncology    COLONOSCOPY      LYMPH NODE BIOPSY Right 5/5/2022    Procedure: LYMPHATIC MAPPING WITH BLUE AND RADIOACTIVE DYES, SENTINEL LYMPH  NODE BIOPSY, INJECTION IN OR AT [facility];  Surgeon: Errol Schmidt MD;  Location: [facility];  Service: Surgical Oncology    US BREAST SHIRA  NEEDLE LOC RIGHT Right 4/12/2022    US GUIDANCE BREAST BIOPSY RIGHT EACH ADDITIONAL Right 4/12/2022    US GUIDED BREAST BIOPSY RIGHT COMPLETE Right 4/12/2022     Family History   Problem Relation Age of Onset    Breast cancer Mother     Heart disease Mother     Heart disease Father     No Known Problems Sister     No Known Problems Brother     No Known Problems Brother     No Known Problems Brother     Cancer Maternal Grandmother 80        either liver or pancreatic cancer not sure    Arrhythmia Son     No Known Problems Maternal Aunt     Uterine cancer Paternal Aunt 50    Breast cancer Paternal Aunt 52        maybe not sure     Social History     Socioeconomic History    Marital status: /Civil Union     Spouse name: Not on file    Number of children: Not on file    Years of education: Not on file    Highest education level: Not on file   Occupational History    Not on file   Tobacco Use    Smoking status: Never Smoker    Smokeless tobacco: Never Used   Vaping Use    Vaping Use: Never used   Substance and Sexual Activity    Alcohol use: Not Currently     Alcohol/week: 7 0 standard drinks     Types: 7 Glasses of wine per week     Comment: none in the last month    Drug use: Never    Sexual activity: Yes     Partners: Male   Other Topics Concern    Not on file   Social History Narrative    Not on file     Social Determinants of Health     Financial Resource Strain: Not on file   Food Insecurity: Not on file   Transportation Needs: Not on file   Physical Activity: Not on file   Stress: Not on file   Social Connections: Not on file   Intimate Partner Violence: Not on file   Housing Stability: Not on file       Current Outpatient Medications:     cholecalciferol (VITAMIN D3) 1,000 units tablet, Take 1,000 Units by mouth daily, Disp: , Rfl:     Digestive Enzymes (DIGESTIVE ENZYME PO), Take by mouth 3 (three) times a day Taken after meals, Disp: , Rfl:     NON FORMULARY, Kyolic, Disp: , Rfl:     Probiotic Product (PROBIOTIC PO), Take by mouth in the morning, Disp: , Rfl:     QUERCETIN PO, Take by mouth 3 (three) times a week, Disp: , Rfl:     lisinopril (ZESTRIL) 20 mg tablet, Take 20 mg by mouth daily (Patient not taking: Reported on 8/18/2022), Disp: , Rfl:   No Known Allergies    Physical Exam:     Vitals:    08/18/22 0904   Pulse: 89   Resp: 16   Temp: 99 6 °F (37 6 °C)   SpO2: 96%     Physical Exam  Constitutional:       Appearance: Normal appearance  HENT:      Head: Normocephalic and atraumatic  Nose: Nose normal       Mouth/Throat:      Mouth: Mucous membranes are moist    Eyes:      Pupils: Pupils are equal, round, and reactive to light  Cardiovascular:      Rate and Rhythm: Normal rate  Pulses: Normal pulses  Heart sounds: Normal heart sounds  Pulmonary:      Effort: Pulmonary effort is normal       Breath sounds: Normal breath sounds  Chest:          Comments: The bilateral Breast(s) were examined  There was not any sign of an inverted nipple, mass, nipple discharge, skin changes or tenderness  The bilateral  breast(s) were examined in the sitting and supine position  There are not any worrisome skin changes, tenderness, nipple changes, swelling ,bleeding or evidence of mass/s in all four quadrants  Tonia survey demonstrated that there is not any evidence of any clinically suspicious axillary, pectoral or supraclavicular lymph nodes  Abdominal:      General: Bowel sounds are normal       Palpations: Abdomen is soft  Musculoskeletal:         General: Normal range of motion  Cervical back: Normal range of motion and neck supple  Skin:     General: Skin is warm  Neurological:      General: No focal deficit present  Mental Status: She is alert and oriented to person, place, and time  Psychiatric:         Mood and Affect: Mood normal          Behavior: Behavior normal          Thought Content:  Thought content normal          Judgment: Judgment normal            Results & Discussion:   I did review recent for bloody nipple discharge  I also emphasized again need for adjuvant radiation and endocrine therapy these are significantly reduced the local recurrence as well as distant disease  she understands and  agrees   All patient questions were answered  Advance Care Planning/Advance Directives: Janet Preston MD discussed the disease status with Hany Gordillo  today 08/18/22  treatment plans and follow-up with the patient

## 2022-08-19 ENCOUNTER — TELEPHONE (OUTPATIENT)
Dept: HEMATOLOGY ONCOLOGY | Facility: CLINIC | Age: 67
End: 2022-08-19

## 2022-09-06 ENCOUNTER — HOSPITAL ENCOUNTER (OUTPATIENT)
Dept: ULTRASOUND IMAGING | Facility: CLINIC | Age: 67
Discharge: HOME | End: 2022-09-06
Payer: MEDICARE

## 2022-09-06 ENCOUNTER — HOSPITAL ENCOUNTER (OUTPATIENT)
Dept: MAMMOGRAPHY | Facility: CLINIC | Age: 67
Discharge: HOME | End: 2022-09-06
Payer: MEDICARE

## 2022-09-06 VITALS — HEIGHT: 65 IN | WEIGHT: 158 LBS | BODY MASS INDEX: 26.33 KG/M2

## 2022-09-06 DIAGNOSIS — C50.511 MALIGNANT NEOPLASM OF LOWER-OUTER QUADRANT OF RIGHT BREAST OF FEMALE, ESTROGEN RECEPTOR POSITIVE (HCC): ICD-10-CM

## 2022-09-06 DIAGNOSIS — N64.52 NIPPLE DISCHARGE IN FEMALE: ICD-10-CM

## 2022-09-06 DIAGNOSIS — Z17.0 MALIGNANT NEOPLASM OF LOWER-OUTER QUADRANT OF RIGHT BREAST OF FEMALE, ESTROGEN RECEPTOR POSITIVE (HCC): ICD-10-CM

## 2022-09-06 PROCEDURE — 76642 ULTRASOUND BREAST LIMITED: CPT

## 2022-09-06 PROCEDURE — 77065 DX MAMMO INCL CAD UNI: CPT

## 2022-09-06 PROCEDURE — G0279 TOMOSYNTHESIS, MAMMO: HCPCS

## 2022-09-14 ENCOUNTER — OFFICE VISIT (OUTPATIENT)
Dept: SURGICAL ONCOLOGY | Facility: CLINIC | Age: 67
End: 2022-09-14
Payer: MEDICARE

## 2022-09-14 VITALS
BODY MASS INDEX: 26.33 KG/M2 | RESPIRATION RATE: 18 BRPM | SYSTOLIC BLOOD PRESSURE: 220 MMHG | HEART RATE: 83 BPM | DIASTOLIC BLOOD PRESSURE: 120 MMHG | OXYGEN SATURATION: 99 % | TEMPERATURE: 97.9 F | WEIGHT: 158 LBS | HEIGHT: 65 IN

## 2022-09-14 DIAGNOSIS — C50.511 MALIGNANT NEOPLASM OF LOWER-OUTER QUADRANT OF RIGHT BREAST OF FEMALE, ESTROGEN RECEPTOR POSITIVE (HCC): Primary | ICD-10-CM

## 2022-09-14 DIAGNOSIS — Z17.0 MALIGNANT NEOPLASM OF LOWER-OUTER QUADRANT OF RIGHT BREAST OF FEMALE, ESTROGEN RECEPTOR POSITIVE (HCC): Primary | ICD-10-CM

## 2022-09-14 DIAGNOSIS — N64.52 NIPPLE DISCHARGE IN FEMALE: ICD-10-CM

## 2022-09-14 PROCEDURE — 99214 OFFICE O/P EST MOD 30 MIN: CPT | Performed by: SURGERY

## 2022-09-14 NOTE — PROGRESS NOTES
Surgical Oncology Follow Up  Demetrioi  42  [facility]  [facility] [place] [address]    [facility]  1955  [id number]      Chief Complaint   Patient presents with    Follow-up        Assessment & Plan: This is a 43-year-old female follow-up with right breast cancer she is doing well  Adjuvant endocrine therapy was recommended by her medical oncologist and patient has  Refused  She denies of any breast pain nipple discharge nipple retraction or skin changes other than the surgical scar  We will continuously follow her closely  Cancer History:     Oncology History Overview Note    recently diagnosed with stage IA ([id number]) 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 status post resection on 22 achieving widely negative margins  Tumor cells were ER positive, ID negative, and HER2 negative  MammaPrint has returned as low risk  She was seen in follow up on 6/3/22 and was offered adjuvant radiation therapy to the right breast  At that time, she was undecided but willing to consider APBI radiation  She also wished to consider and discuss with Medical Oncology regarding endocrine therapy alone  22 Dr Domo Monte  recommend adjuvant endocrine therapy with Letrozole for at least 5 years  she is unsure about what she prefers to do  She does not prefer to have a scheduled visit  She will contact the office if she wishes to undergo endocrine therapy    22 Dr Lima Riggs  today she is coming with a new finding in her opposite breast occasional clear to bloody nipple discharge  extensively discussed with her need for adjuvant radiation she finally agreed to have a conversation with Dr Gala Miller again  refer her back to medical oncologist to discuss the endocrine therapy    will obtain left breast ultrasound and diagnostic mammogram     22 Left breast US and diagnostic Mammogram      Upcomin22 Dr Lima Riggs Malignant neoplasm of lower-outer quadrant of right breast of female, estrogen receptor positive ([facility] [address] )   4/12/2022 Initial Diagnosis    Malignant neoplasm of lower-outer quadrant of right breast of female, estrogen receptor positive ([facility] [address] )     4/12/2022 Biopsy    Right breast ultrasound guided biopsy  A  8 o'clock, 3 cm from nipple  Invasive mammary carcinoma of no special type (ductal, not otherwise specified)     MS 0  HER2 1+    B  6 o'clock, 5 cm from nipple   Benign breast tissue, no carcinoma identified    Concordant  Malignancy appears unifocal  Mass measures 0 9 cm on ultrasound  Ultrasound of Right axilla shows no suspicious adenopathy  Left breast has no suspicious findings  Cece  clip placed  4/20/2022 Genetic Testing    Invitae ordered  Blood drawn  Patient then refused testing       4/21/2022 Genomic Testing    MammaPrint   Low risk  Luminal type A  MammaPrint index: +0 291      5/5/2022 Surgery    Right breast cece  directed lumpectomy with sentinel lymph node biopsy  Invasive ductal carcinoma  Grade 2  1 3 cm  Margins negative  0/2 lymph nodes             Interval History:   Follow-up with right breast cancer  Review of Systems:   Review of Systems   Constitutional: Negative for chills and fever  HENT: Negative for ear pain and sore throat  Eyes: Negative for pain and visual disturbance  Respiratory: Negative for cough and shortness of breath  Cardiovascular: Negative for chest pain and palpitations  Gastrointestinal: Negative for abdominal pain and vomiting  Genitourinary: Negative for dysuria and hematuria  Musculoskeletal: Negative for arthralgias and back pain  Skin: Negative for color change and rash  Neurological: Negative for seizures and syncope  All other systems reviewed and are negative        Past Medical History     Patient Active Problem List   Diagnosis    Hyperlipidemia    Impaired fasting glucose    Malignant neoplasm of lower-outer quadrant of right breast of female, estrogen receptor positive ([facility] [address]    Primary hypertension    Breast lump or mass    Family history of breast cancer    Nipple discharge in female     Past Medical History:   Diagnosis Date    Breast cancer ([facility] [address] )     Hyperlipidemia     Hypertension      Past Surgical History:   Procedure Laterality Date    APPENDECTOMY      BREAST BIOPSY Left 2002    benign    BREAST LUMPECTOMY Right 5/5/2022    Procedure: SHAILESH  DIRECTED LUMPECTOMY;  Surgeon: Fransisca Negrete MD;  [facility];  Service: Surgical Oncology    COLONOSCOPY      LYMPH NODE BIOPSY Right 5/5/2022    Procedure: LYMPHATIC MAPPING WITH BLUE AND RADIOACTIVE DYES, SENTINEL LYMPH  NODE BIOPSY, INJECTION IN OR AT 0930 BY DR Jarad Levy;  Surgeon: Fransisca Negrete MD;  [facility]: [facility];  Service: Surgical Oncology    US BREAST SHAILESH  NEEDLE LOC RIGHT Right 4/12/2022    US GUIDANCE BREAST BIOPSY RIGHT EACH ADDITIONAL Right 4/12/2022    US GUIDED BREAST BIOPSY RIGHT COMPLETE Right 4/12/2022     Family History   Problem Relation Age of Onset    Breast cancer Mother 72    Heart disease Mother     Heart disease Father     No Known Problems Sister     Cancer Maternal Grandmother 80        either liver or pancreatic cancer not sure    No Known Problems Brother     No Known Problems Brother     No Known Problems Brother     Arrhythmia Son     No Known Problems Maternal Aunt     Uterine cancer Paternal Aunt 50    Breast cancer Paternal Aunt 52        maybe not sure     Social History     Socioeconomic History    Marital status: /Civil Union     Spouse name: Not on file    Number of children: Not on file    Years of education: Not on file    Highest education level: Not on file   Occupational History    Not on file   Tobacco Use    Smoking status: Never Smoker    Smokeless tobacco: Never Used   Vaping Use    Vaping Use: Never used   Substance and Sexual Activity  Alcohol use: Not Currently     Alcohol/week: 7 0 standard drinks     Types: 7 Glasses of wine per week     Comment: none in the last month    Drug use: Never    Sexual activity: Yes     Partners: Male   Other Topics Concern    Not on file   Social History Narrative    Not on file     Social Determinants of Health     Financial Resource Strain: Not on file   Food Insecurity: Not on file   Transportation Needs: Not on file   Physical Activity: Not on file   Stress: Not on file   Social Connections: Not on file   Intimate Partner Violence: Not on file   Housing Stability: Not on file       Current Outpatient Medications:     cholecalciferol (VITAMIN D3) 1,000 units tablet, Take 1,000 Units by mouth daily (Patient not taking: Reported on 9/14/2022), Disp: , Rfl:     Digestive Enzymes (DIGESTIVE ENZYME PO), Take by mouth 3 (three) times a day Taken after meals (Patient not taking: Reported on 9/14/2022), Disp: , Rfl:     lisinopril (ZESTRIL) 20 mg tablet, Take 20 mg by mouth daily (Patient not taking: No sig reported), Disp: , Rfl:     NON FORMULARY, Kyolic (Patient not taking: Reported on 9/14/2022), Disp: , Rfl:     Probiotic Product (PROBIOTIC PO), Take by mouth in the morning (Patient not taking: Reported on 9/14/2022), Disp: , Rfl:     QUERCETIN PO, Take by mouth 3 (three) times a week (Patient not taking: Reported on 9/14/2022), Disp: , Rfl:   No Known Allergies    Physical Exam:     Vitals:    09/14/22 0935   BP: (!) 220/120   Pulse: 83   Resp: 18   Temp: 97 9 °F (36 6 °C)   SpO2: 99%     Physical Exam  Constitutional:       Appearance: Normal appearance  HENT:      Head: Normocephalic and atraumatic  Nose: Nose normal       Mouth/Throat:      Mouth: Mucous membranes are moist    Eyes:      Pupils: Pupils are equal, round, and reactive to light  Cardiovascular:      Rate and Rhythm: Normal rate  Pulses: Normal pulses  Heart sounds: Normal heart sounds     Pulmonary:      Effort: Pulmonary effort is normal       Breath sounds: Normal breath sounds  Chest:          Comments: The bilateral Breast(s) were examined  There was not any sign of an inverted nipple, mass, nipple discharge, skin changes or tenderness  The bilateral  breast(s) were examined in the sitting and supine position  There are not any worrisome skin changes, tenderness, nipple changes, swelling ,bleeding or evidence of mass/s in all four quadrants  Tonia survey demonstrated that there is not any evidence of any clinically suspicious axillary, pectoral or supraclavicular lymph nodes  Abdominal:      General: Bowel sounds are normal       Palpations: Abdomen is soft  Musculoskeletal:         General: Normal range of motion  Cervical back: Normal range of motion and neck supple  Skin:     General: Skin is warm  Neurological:      General: No focal deficit present  Mental Status: She is alert and oriented to person, place, and time  Psychiatric:         Mood and Affect: Mood normal          Behavior: Behavior normal          Thought Content: Thought content normal          Judgment: Judgment normal            Results & Discussion:   FINDINGS:  Left breast mammogram and ultrasound diagnostic  There are no suspicious masses, grouped microcalcifications or areas of unexplained architectural distortion  The skin and nipple areolar complex are unremarkable  Targeted ultrasound demonstrates no evidence of intraductal mass  Clinical follow-up is recommended as the bloody nipple discharge appears to have stopped after 1 day  She states no longer any type of discharge I was unable to impress any discharge  We discussed the benefit of the MRI  But she does not want to do at this time  She says if she has further discharge she will call us  Otherwise we will follow her in 6 months    She has not undergone radiation for right breast after extensive conversation she is going to go and rediscuss with radiation oncologist   We will see her in 6 months she was told to call us with any questions or concern in the interim she understand and agree to do so  I did review and discussed in detail the benefits of adjuvant radiation  she understands and  agrees to revisit with radiation oncology    All patient questions were answered  Advance Care Planning/Advance Directives: Janet Preston MD discussed the disease status with Hany Gordillo  today 09/14/22  treatment plans and follow-up with the patient

## 2022-09-19 ENCOUNTER — RADIATION ONCOLOGY CONSULT (OUTPATIENT)
Dept: RADIATION ONCOLOGY | Facility: CLINIC | Age: 67
End: 2022-09-19
Attending: RADIOLOGY
Payer: MEDICARE

## 2022-09-19 VITALS
TEMPERATURE: 97.4 F | RESPIRATION RATE: 16 BRPM | WEIGHT: 151 LBS | BODY MASS INDEX: 25.13 KG/M2 | OXYGEN SATURATION: 95 % | HEART RATE: 79 BPM

## 2022-09-19 DIAGNOSIS — C50.511 MALIGNANT NEOPLASM OF LOWER-OUTER QUADRANT OF RIGHT BREAST OF FEMALE, ESTROGEN RECEPTOR POSITIVE (HCC): Primary | ICD-10-CM

## 2022-09-19 DIAGNOSIS — Z17.0 MALIGNANT NEOPLASM OF LOWER-OUTER QUADRANT OF RIGHT BREAST OF FEMALE, ESTROGEN RECEPTOR POSITIVE (HCC): Primary | ICD-10-CM

## 2022-09-19 DIAGNOSIS — C50.511 MALIGNANT NEOPLASM OF LOWER-OUTER QUADRANT OF RIGHT BREAST OF FEMALE, ESTROGEN RECEPTOR POSITIVE (HCC): ICD-10-CM

## 2022-09-19 DIAGNOSIS — Z17.0 MALIGNANT NEOPLASM OF LOWER-OUTER QUADRANT OF RIGHT BREAST OF FEMALE, ESTROGEN RECEPTOR POSITIVE (HCC): ICD-10-CM

## 2022-09-19 PROCEDURE — 99211 OFF/OP EST MAY X REQ PHY/QHP: CPT | Performed by: RADIOLOGY

## 2022-09-19 PROCEDURE — 99215 OFFICE O/P EST HI 40 MIN: CPT | Performed by: RADIOLOGY

## 2022-09-19 NOTE — PROGRESS NOTES
Norberto Fothergill 1955 is a 73 y o  female  recently diagnosed with stage IA ([id number]) 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 status post resection on 22 achieving widely negative margins  Tumor cells were ER positive, TN negative, and HER2 negative  MammaPrint has returned as low risk  She was seen in follow up on 6/3/22 and was offered adjuvant radiation therapy to the right breast  At that time, she was undecided but willing to consider APBI radiation  She also wished to consider and discuss with Medical Oncology regarding endocrine therapy alone  22 Dr Jennifer Harrell  recommend adjuvant endocrine therapy with Letrozole for at least 5 years  she is unsure about what she prefers to do  She does not prefer to have a scheduled visit  She will contact the office if she wishes to undergo endocrine therapy    22 Dr Shannon Islas  today she is coming with a new finding in her opposite breast occasional clear to bloody nipple discharge  extensively discussed with her need for adjuvant radiation she finally agreed to have a conversation with Dr Trina Valente again  refer her back to medical oncologist to discuss the endocrine therapy  will obtain left breast ultrasound and diagnostic mammogram     22 Left breast US and diagnostic Mammogram  FINDINGS:   There are no suspicious masses, grouped microcalcifications or areas of unexplained architectural distortion  The skin and nipple areolar complex are unremarkable  Targeted ultrasound demonstrates no evidence of intraductal mass  Clinical follow-up is recommended as the bloody nipple discharge appears to have stopped after 1 day    RECOMMENDATION:       - Routine screening mammogram in 1 year for the left breast     Upcomin22 Dr Shannon Islas      Follow up visit     Oncology History   Malignant neoplasm of lower-outer quadrant of right breast of female, estrogen receptor positive ([facility] [address] )   2022 Initial Diagnosis    Malignant neoplasm of lower-outer quadrant of right breast of female, estrogen receptor positive ([address] )     4/12/2022 Biopsy    Right breast ultrasound guided biopsy  A  8 o'clock, 3 cm from nipple  Invasive mammary carcinoma of no special type (ductal, not otherwise specified)     NC 0  HER2 1+    B  6 o'clock, 5 cm from nipple   Benign breast tissue, no carcinoma identified    Concordant  Malignancy appears unifocal  Mass measures 0 9 cm on ultrasound  Ultrasound of Right axilla shows no suspicious adenopathy  Left breast has no suspicious findings  Cece  clip placed  4/20/2022 Genetic Testing    Invitae ordered  Blood drawn  Patient then refused testing       4/21/2022 Genomic Testing    MammaPrint   Low risk  Luminal type A  MammaPrint index: +0 291      5/5/2022 Surgery    Right breast cece  directed lumpectomy with sentinel lymph node biopsy  Invasive ductal carcinoma  Grade 2  1 3 cm  Margins negative  0/2 lymph nodes           Review of Systems:  Review of Systems   Constitutional: Negative  HENT: Negative  Thrush  Treated by dentist   Eyes: Negative  Respiratory: Positive for cough (in the morning)  Cardiovascular: Negative  Gastrointestinal: Negative  Endocrine: Negative  Genitourinary: Positive for frequency  Musculoskeletal:        Discomfort right breast   Skin:        Surgical incisions are healing   Allergic/Immunologic: Negative  Neurological: Negative  Hematological: Negative  Psychiatric/Behavioral: Negative for sleep disturbance and suicidal ideas  The patient is nervous/anxious          Clinical Trial: no    Teaching review side effects, SIM    Covid Vaccine Status no    Health Maintenance   Topic Date Due    Hepatitis C Screening  Never done    COVID-19 Vaccine (1) Never done    Pneumococcal Vaccine: 65+ Years (1 - PCV) Never done    HIV Screening  Never done    Osteoporosis Screening  Never done    Falls: Plan of Care  Never done    Influenza Vaccine (1) 09/01/2022    BMI: Followup Plan  01/18/2023    Urinary Incontinence Screening  01/18/2023    Medicare Annual Wellness Visit (AWV)  01/18/2023    Fall Risk  04/20/2023    Depression Screening  06/03/2023    Breast Cancer Screening: Mammogram  09/06/2023    BMI: Adult  09/14/2023    Colorectal Cancer Screening  01/24/2025    HIB Vaccine  Aged Out    Hepatitis B Vaccine  Aged Out    IPV Vaccine  Aged Out    Hepatitis A Vaccine  Aged Out    Meningococcal ACWY Vaccine  Aged Out    HPV Vaccine  Aged Out     Patient Active Problem List   Diagnosis    Hyperlipidemia    Impaired fasting glucose    Malignant neoplasm of lower-outer quadrant of right breast of female, estrogen receptor positive ([facility] [address]    Primary hypertension    Breast lump or mass    Family history of breast cancer    Nipple discharge in female     Past Medical History:   Diagnosis Date    Breast cancer ([facility] [address]     Hyperlipidemia     Hypertension      Past Surgical History:   Procedure Laterality Date    APPENDECTOMY      BREAST BIOPSY Left 2002    benign    BREAST LUMPECTOMY Right 5/5/2022    Procedure: SHAILESH  DIRECTED LUMPECTOMY;  Surgeon: Terry Avalos MD;  [facility]: [facility];  Service: Surgical Oncology    COLONOSCOPY      LYMPH NODE BIOPSY Right 5/5/2022    Procedure: LYMPHATIC MAPPING WITH BLUE AND RADIOACTIVE DYES, SENTINEL LYMPH  NODE BIOPSY, INJECTION IN OR AT 0930 BY DR Rebecca Elam;  Surgeon: Terry Avalos MD;  [facility];  Service: Surgical Oncology    US BREAST SHAILESH  NEEDLE LOC RIGHT Right 4/12/2022    US GUIDANCE BREAST BIOPSY RIGHT EACH ADDITIONAL Right 4/12/2022    US GUIDED BREAST BIOPSY RIGHT COMPLETE Right 4/12/2022     Family History   Problem Relation Age of Onset    Breast cancer Mother 72    Heart disease Mother     Heart disease Father     No Known Problems Sister     Cancer Maternal Grandmother 80        either liver or pancreatic cancer not sure    No Known Problems Brother     No Known Problems Brother     No Known Problems Brother     Arrhythmia Son     No Known Problems Maternal Aunt     Uterine cancer Paternal Aunt 50    Breast cancer Paternal Aunt 46        maybe not sure     Social History     Socioeconomic History    Marital status: /Civil Union     Spouse name: Not on file    Number of children: Not on file    Years of education: Not on file    Highest education level: Not on file   Occupational History    Not on file   Tobacco Use    Smoking status: Never Smoker    Smokeless tobacco: Never Used   Vaping Use    Vaping Use: Never used   Substance and Sexual Activity    Alcohol use: Not Currently     Alcohol/week: 7 0 standard drinks     Types: 7 Glasses of wine per week     Comment: none in the last month    Drug use: Never    Sexual activity: Yes     Partners: Male   Other Topics Concern    Not on file   Social History Narrative    Not on file     Social Determinants of Health     Financial Resource Strain: Not on file   Food Insecurity: Not on file   Transportation Needs: Not on file   Physical Activity: Not on file   Stress: Not on file   Social Connections: Not on file   Intimate Partner Violence: Not on file   Housing Stability: Not on file       Current Outpatient Medications:     cholecalciferol (VITAMIN D3) 1,000 units tablet, Take 1,000 Units by mouth daily (Patient not taking: Reported on 9/14/2022), Disp: , Rfl:     Digestive Enzymes (DIGESTIVE ENZYME PO), Take by mouth 3 (three) times a day Taken after meals (Patient not taking: Reported on 9/14/2022), Disp: , Rfl:     lisinopril (ZESTRIL) 20 mg tablet, Take 20 mg by mouth daily (Patient not taking: No sig reported), Disp: , Rfl:     NON FORMULARY, Kyolic (Patient not taking: Reported on 9/14/2022), Disp: , Rfl:     Probiotic Product (PROBIOTIC PO), Take by mouth in the morning (Patient not taking: Reported on 9/14/2022), Disp: , Rfl:     QUERCETIN PO, Take by mouth 3 (three) times a week (Patient not taking: Reported on 9/14/2022), Disp: , Rfl:   No Known Allergies  There were no vitals filed for this visit

## 2022-09-19 NOTE — PROGRESS NOTES
Consultation - Radiation Oncology      HEV:[id number] : 1955  Encounter: [id number]  Patient Information: [facility]      CHIEF COMPLAINT  Chief Complaint   Patient presents with    Breast Cancer     Cancer Staging  Malignant neoplasm of lower-outer quadrant of right breast of female, estrogen receptor positive ([facility] [address] )  Staging form: Breast, AJCC 8th Edition  - Clinical: Stage IA (cT1c, cN0, cM0, G2, ER+, NV-, HER2-) - Unsigned  Histologic grading system: 3 grade system           History of Present Illness   [facility] is a 73y o  year old female  diagnosed with stage IA ([id number]) 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 status post resection on 22 achieving widely negative margins  Tumor cells were ER positive, NV negative, and HER2 negative   MammaPrint returned as low risk  She was seen in follow up on 6/3/22 and was offered adjuvant radiation therapy to the right breast   She ultimately refused radiation  She has been referred back for consideration for adjuvant radiation again      22 Dr Jovan Newsome  recommend adjuvant endocrine therapy with Letrozole for at least 5 years  she is unsure about what she prefers to do  She does not prefer to have a scheduled visit  She will contact the office if she wishes to undergo endocrine therapy    Ultimately, the patient refused adjuvant endocrine therapy  She was referred back to Medical Oncology, but she states that she is not planning to return to see Dr Jovan Newsome and has decided not to pursue endocrine therapy      22 Dr Vickie Koch saw the patient for new finding in her opposite breast occasional clear to bloody nipple discharge   Extensively discussed with her need for adjuvant radiation she finally agreed to have a conversation with Dr Winkler Rolling again  refer her back to medical oncologist to discuss the endocrine therapy    will obtain left breast ultrasound and diagnostic mammogram      22 Left breast US and diagnostic Mammogram  There are no suspicious masses, grouped microcalcifications or areas of unexplained architectural distortion  The skin and nipple areolar complex are unremarkable    Targeted ultrasound demonstrates no evidence of intraductal mass   Clinical follow-up is recommended as the bloody nipple discharge appears to have stopped after 1 day  The patient denies breast pain, tenderness, suspicious skin lesions, palpable nodules of the breasts bilaterally  Bloody discharge resolved after 1 day and has not recurred in either breast   She notes retraction and hardening of her right breast associated with her lumpectomy scar  Cosmetic outcome is bothersome to her, but she is unwilling to consider reduction or mammaplasty of the opposite breast to improve symmetry  She states that she was instructed to perform massage to the lumpectomy site and has been doing this episodically with slight improvement  The patient does not understand why radiation is indicated given low risk of recurrence on Mammaprint  She stated to the staff that she would only take "five treatment one" regarding radiation, but that she was very against radiation treatment  She is, however, willing to discuss adjuvant radiation today  Historical Information   Oncology History   Malignant neoplasm of lower-outer quadrant of right breast of female, estrogen receptor positive ([facility] [address] )   4/12/2022 Initial Diagnosis    Malignant neoplasm of lower-outer quadrant of right breast of female, estrogen receptor positive ([facility] [address] )     4/12/2022 Biopsy    Right breast ultrasound guided biopsy  A  8 o'clock, 3 cm from nipple  Invasive mammary carcinoma of no special type (ductal, not otherwise specified)     [place] 0  HER2 1+    B  6 o'clock, 5 cm from nipple   Benign breast tissue, no carcinoma identified    Concordant  Malignancy appears unifocal  Mass measures 0 9 cm on ultrasound  Ultrasound of Right axilla shows no suspicious adenopathy   Left breast has no suspicious findings  Shailesh  clip placed        4/20/2022 Genetic Testing    Invitae ordered  Blood drawn  Patient then refused testing       4/21/2022 Genomic Testing    MammaPrint   Low risk  Luminal type A  MammaPrint index: +0 291      5/5/2022 Surgery    Right breast shailesh  directed lumpectomy with sentinel lymph node biopsy  Invasive ductal carcinoma  Grade 2  1 3 cm  Margins negative  0/2 lymph nodes             Past Medical History:   Diagnosis Date    Breast cancer ([facility] [address] )     Hyperlipidemia     Hypertension      Past Surgical History:   Procedure Laterality Date    APPENDECTOMY      BREAST BIOPSY Left 2002    benign    BREAST LUMPECTOMY Right 5/5/2022    Procedure: SHAILESH  DIRECTED LUMPECTOMY;  Surgeon: Jonathon Bentley MD;  Location: [facility];  Service: Surgical Oncology    COLONOSCOPY      LYMPH NODE BIOPSY Right 5/5/2022    Procedure: LYMPHATIC MAPPING WITH BLUE AND RADIOACTIVE DYES, SENTINEL LYMPH  NODE BIOPSY, INJECTION IN OR AT 0930 BY DR Carl Dewitt;  Surgeon: Jonathon Bentley MD;  Location: [facility];  Service: Surgical Oncology    US BREAST SHAILESH  NEEDLE LOC RIGHT Right 4/12/2022    US GUIDANCE BREAST BIOPSY RIGHT EACH ADDITIONAL Right 4/12/2022    US GUIDED BREAST BIOPSY RIGHT COMPLETE Right 4/12/2022       Family History   Problem Relation Age of Onset    Breast cancer Mother 72    Heart disease Mother     Heart disease Father     No Known Problems Sister     Cancer Maternal Grandmother 80        either liver or pancreatic cancer not sure    No Known Problems Brother     No Known Problems Brother     No Known Problems Brother     Arrhythmia Son     No Known Problems Maternal Aunt     Uterine cancer Paternal Aunt 50    Breast cancer Paternal Aunt 52        maybe not sure       Social History   Social History     Substance and Sexual Activity   Alcohol Use Not Currently    Alcohol/week: 7 0 standard drinks    Types: 7 Glasses of wine per week    Comment: none in the last month     Social History     Substance and Sexual Activity   Drug Use Never     Social History     Tobacco Use   Smoking Status Never Smoker   Smokeless Tobacco Never Used         Meds/Allergies     Current Outpatient Medications:     cholecalciferol (VITAMIN D3) 1,000 units tablet, Take 1,000 Units by mouth daily (Patient not taking: No sig reported), Disp: , Rfl:     Digestive Enzymes (DIGESTIVE ENZYME PO), Take by mouth 3 (three) times a day Taken after meals (Patient not taking: No sig reported), Disp: , Rfl:     lisinopril (ZESTRIL) 20 mg tablet, Take 20 mg by mouth daily (Patient not taking: No sig reported), Disp: , Rfl:     NON FORMULARY, Kyolic (Patient not taking: No sig reported), Disp: , Rfl:     Probiotic Product (PROBIOTIC PO), Take by mouth in the morning (Patient not taking: No sig reported), Disp: , Rfl:     QUERCETIN PO, Take by mouth 3 (three) times a week (Patient not taking: No sig reported), Disp: , Rfl:   No Known Allergies      Review of Systems  Constitutional: Negative  HENT: Negative  Thrush  Treated by dentist   Eyes: Negative  Respiratory: Positive for cough (in the morning)  Cardiovascular: Negative  Gastrointestinal: Negative  Endocrine: Negative  Genitourinary: Positive for frequency  Musculoskeletal:        Discomfort right breast   Skin:        Surgical incisions are healing   Allergic/Immunologic: Negative  Neurological: Negative  Hematological: Negative  Psychiatric/Behavioral: Negative for sleep disturbance and suicidal ideas  The patient is nervous/anxious      OBJECTIVE:   Pulse 79   Temp (!) 97 4 °F (36 3 °C)   Resp 16   Wt 68 5 kg (151 lb)   SpO2 95%   BMI 25 13 kg/m²   Performance Status: Karnofsky: 100 - Fully active, able to carry on all pre-disease performed without restriction    Physical Exam  Vitals and nursing note reviewed     Constitutional:       General: She is not in acute distress  Appearance: She is well-developed  Eyes:      General: No scleral icterus  Cardiovascular:      Rate and Rhythm: Normal rate and regular rhythm  Heart sounds: Normal heart sounds  No murmur heard  Pulmonary:      Breath sounds: No wheezing, rhonchi or rales  Chest:   Breasts:      Right: No axillary adenopathy or supraclavicular adenopathy  Left: No axillary adenopathy or supraclavicular adenopathy  Comments: Breast examination demonstrates right breast with well-healed 6:00 lumpectomy scar associated with retraction resulting in asymmetry in contour  There is moderate fibrotic changes associated with scar  The right breast is slightly smaller in size  There are no palpable nodules or suspicious skin changes of the breasts bilaterally  Abdominal:      General: There is no distension  Palpations: Abdomen is soft  Tenderness: There is no abdominal tenderness  Musculoskeletal:      Right lower leg: No edema  Left lower leg: No edema  Lymphadenopathy:      Cervical: No cervical adenopathy  Upper Body:      Right upper body: No supraclavicular or axillary adenopathy  Left upper body: No supraclavicular or axillary adenopathy  Neurological:      Mental Status: She is alert and oriented to person, place, and time  RESULTS  Imaging Studies  Mammo diagnostic left w 3d & cad, US breast left limited (diagnostic)    Result Date: 9/6/2022  Narrative: DIAGNOSIS: Malignant neoplasm of lower-outer quadrant of right breast of female, estrogen receptor positive ([address] ); Nipple discharge in female TECHNIQUE: Digital diagnostic mammography was performed  Computer Aided Detection (CAD) analyzed all applicable images  Ultrasound of the left breast(s) was performed   COMPARISONS: Prior breast imaging dated: 02/28/2022, 12/18/2012, 12/14/2011, 11/13/2010, 03/11/2010, 03/11/2010, 02/22/2010, 09/21/2009, and 09/18/2008 RELEVANT HISTORY: Family Breast Cancer History: History of breast cancer in Mother, Paternal Aunt  Family Medical History: Family medical history includes breast cancer in 2 relatives (mother, paternal aunt)  Personal History: Hormone history includes birth control  Surgical history includes breast biopsy and lumpectomy  Medical history includes breast cancer  RISK ASSESSMENT: [facility] risk assessment reporting was suppressed due to the patient's history and/or demographic factors  TISSUE DENSITY: There are scattered areas of fibroglandular density  INDICATION: Randy Tomlinson is a 73 y o  female presenting for Left breast bloody nipple discharge  FINDINGS: There are no suspicious masses, grouped microcalcifications or areas of unexplained architectural distortion  The skin and nipple areolar complex are unremarkable  Targeted ultrasound demonstrates no evidence of intraductal mass  Clinical follow-up is recommended as the bloody nipple discharge appears to have stopped after 1 day  Impression: Clinical follow-up recommended for bloody nipple discharge which stopped after 1 day  If bloody nipple discharge remains chronic and spontaneous, MRI can be obtained for further evaluation  ASSESSMENT/BI-RADS CATEGORY: Left: 2 - Benign Overall: 2 - Benign RECOMMENDATION:      - Routine screening mammogram in 1 year for the left breast  Workstation ID: [id number]         ASSESSMENT  1   Malignant neoplasm of lower-outer quadrant of right breast of female, estrogen receptor positive ([facility] [address] )  Ambulatory referral to Radiation Oncology     Cancer Staging  Malignant neoplasm of lower-outer quadrant of right breast of female, estrogen receptor positive ([facility] [address] )  Staging form: Breast, AJCC 8th Edition  - Clinical: Stage IA (cT1c, cN0, cM0, G2, ER+, CA-, HER2-) - Unsigned  Histologic grading system: 3 grade system        PLAN/DISCUSSION  Randy Tomlinson is a 73y o  year old female with a history of stage IA ([id number]) 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 status post resection on 5/5/22 achieving widely negative margins  Tumor cells were ER positive, NE negative, and HER2 negative   MammaPrint returned as low risk  She was offered adjuvant radiation and endocrine therapy  She has refused both  She was recently worked up for left breast nippled discharge which was benign  She has been referred back for 3rd discussion regarding adjuvant radiation  I again recommended adjuvant radiation to the right breast for improved local regional control and potential survival benefit  I again explained that adjuvant radiation reduces the relative risk of recurrence within the ipsilateral breast by 60-70%  This protection will be lifelong  We discussed that without adjuvant endocrine therapy or radiation therapy that local control from surgery alone is considered inferior compared to mastectomy  Risk for local recurrence historically without adjuvant therapy would be between 10-15% at 10 years and expected to increase slowly with continued follow-up  EBCTCG has demonstrated improved survival at 15 years for woman with early stage breast cancer who receive radiation and have durable local control  Thus adjuvant radiation is in accordance with NCCN guidelines  I would offer adjuvant whole breast radiation to 40Gy with possible boost to 50Gy  The patient wished to know about APBI  I explained that she met pathologic criteria for APBI at time of her initial consultation, however, this technique requires accurate targeting of the lumpectomy cavity  Given her retraction and fibrosis, the cavity is likely visible but may not represent full extent of original cavity and I feel the technique will not lend itself well due to current anatomy  I do not feel she is a good candidate at this time due to dosimetric and technical constraints      The rationale and potential benefits, as well as the risks and acute and late side effects and potential toxicities of radiation were discussed with the patient at length  Side effects discussed included, but were not limited to: Fatigue, skin erythema, hyperpigmentation, desquamation, fibrosis, shrinkage of the breast resulting asymmetry, rib weakening, and pulmonary fibrosis  The patient was given the opportunity to ask questions and all questions were answered to her satisfaction  She stated that she was undecided and would call our office if she wished to pursue radiation  I had long discussion with the patient that this is her 3rd consultation for adjuvant radiation  Our recommendations have not changed  We are happy to answer questions, but as her treatment continues to be delayed further from time of surgery there may be deterioration of the benefit of radiation  The ideal start time would be within the first 2 months post surgery  Once delay becomes clearly prolonged, then the continued benefit of radiation becomes an area of discussion  As local failure is still not guaranteed even without radiation treatment, it is possible that she does not undergo therapy and does not recur  We would certainly hope for this outcome  It is also possible that she recur and have further treatment and her survival is not impacted, which is probable if she is compliant with her follow-up care  Failure, however, is associated with increased morbidity as she will require further therapy and recurrent disease can be more aggressive or similar  If she again decides to refuse radiation at this time, then we will respect her informed decision as she has had the pros, cons, and risks of each approach discussed with her at length and she has expressed understanding multiple times  She will call us if she decides to pursue radiation and is satisfied with this plan  We will be happy to see her again should the need arise        Maximilian Tompkins MD  5/55/9915,23:75 PM      Portions of the record may have been created with voice recognition software  Occasional wrong word or "sound a like" substitutions may have occurred due to the inherent limitations of voice recognition software  Read the chart carefully and recognize, using context, where substitutions have occurred

## 2022-09-19 NOTE — LETTER
2022     Lucía Rothman MD  [address] [facility] [address]  [facility]    Patient: Donis Luevano   YOB: 1955   Date of Visit: 2022       Dear Dr Janiya King: Thank you for referring Donis Luevano to me for evaluation  Below are my notes for this consultation  If you have questions, please do not hesitate to call me  I look forward to following your patient along with you  Sincerely,        Fred Cintron MD        CC: No Recipients  Fred Cintron MD  2022  1:41 PM  Sign when Signing Visit  Consultation - Radiation Oncology      FXZ:[id number] : 1955  Encounter: [id number]  Patient Information: [address]  Chief Complaint   Patient presents with    Breast Cancer     Cancer Staging  Malignant neoplasm of lower-outer quadrant of right breast of female, estrogen receptor positive ([facility] [address] )  Staging form: Breast, AJCC 8th Edition  - Clinical: Stage IA (cT1c, cN0, cM0, G2, ER+, CO-, HER2-) - Unsigned  Histologic grading system: 3 grade system           History of Present Illness   Donis Luevano is a 73y o  year old female  diagnosed with stage IA ([id number]) 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 status post resection on 22 achieving widely negative margins  Tumor cells were ER positive, CO negative, and HER2 negative   MammaPrint returned as low risk  She was seen in follow up on 6/3/22 and was offered adjuvant radiation therapy to the right breast   She ultimately refused radiation  She has been referred back for consideration for adjuvant radiation again      22 Dr Shelly Basilio  recommend adjuvant endocrine therapy with Letrozole for at least 5 years  she is unsure about what she prefers to do  She does not prefer to have a scheduled visit  She will contact the office if she wishes to undergo endocrine therapy    Ultimately, the patient refused adjuvant endocrine therapy    She was referred back to Medical Oncology, but she states that she is not planning to return to see Dr Katerine Lomeli and has decided not to pursue endocrine therapy      8/18/22 Dr Toni Steele saw the patient for new finding in her opposite breast occasional clear to bloody nipple discharge   Extensively discussed with her need for adjuvant radiation she finally agreed to have a conversation with Dr Maryann Parra again  refer her back to medical oncologist to discuss the endocrine therapy  will obtain left breast ultrasound and diagnostic mammogram      9/6/22 Left breast US and diagnostic Mammogram  There are no suspicious masses, grouped microcalcifications or areas of unexplained architectural distortion  The skin and nipple areolar complex are unremarkable    Targeted ultrasound demonstrates no evidence of intraductal mass   Clinical follow-up is recommended as the bloody nipple discharge appears to have stopped after 1 day  The patient denies breast pain, tenderness, suspicious skin lesions, palpable nodules of the breasts bilaterally  Bloody discharge resolved after 1 day and has not recurred in either breast   She notes retraction and hardening of her right breast associated with her lumpectomy scar  Cosmetic outcome is bothersome to her, but she is unwilling to consider reduction or mammaplasty of the opposite breast to improve symmetry  She states that she was instructed to perform massage to the lumpectomy site and has been doing this episodically with slight improvement  The patient does not understand why radiation is indicated given low risk of recurrence on Mammaprint  She stated to the staff that she would only take "five treatment one" regarding radiation, but that she was very against radiation treatment  She is, however, willing to discuss adjuvant radiation today      Historical Information   Oncology History   Malignant neoplasm of lower-outer quadrant of right breast of female, estrogen receptor positive ([facility] [address] ) 4/12/2022 Initial Diagnosis    Malignant neoplasm of lower-outer quadrant of right breast of female, estrogen receptor positive ([facility] [address] )     4/12/2022 Biopsy    Right breast ultrasound guided biopsy  A  8 o'clock, 3 cm from nipple  Invasive mammary carcinoma of no special type (ductal, not otherwise specified)     IN 0  HER2 1+    B  6 o'clock, 5 cm from nipple   Benign breast tissue, no carcinoma identified    Concordant  Malignancy appears unifocal  Mass measures 0 9 cm on ultrasound  Ultrasound of Right axilla shows no suspicious adenopathy  Left breast has no suspicious findings  Shira  clip placed        4/20/2022 Genetic Testing    Invitae ordered  Blood drawn  Patient then refused testing       4/21/2022 Genomic Testing    MammaPrint   Low risk  Luminal type A  MammaPrint index: +0 291      5/5/2022 Surgery    Right breast shira  directed lumpectomy with sentinel lymph node biopsy  Invasive ductal carcinoma  Grade 2  1 3 cm  Margins negative  0/2 lymph nodes             Past Medical History:   Diagnosis Date    Breast cancer ([facility] [address] )     Hyperlipidemia     Hypertension      Past Surgical History:   Procedure Laterality Date    APPENDECTOMY      BREAST BIOPSY Left 2002    benign    BREAST LUMPECTOMY Right 5/5/2022    Procedure: SHIRA  DIRECTED LUMPECTOMY;  Surgeon: Laura Quinteros MD;  Location: [facility];  Service: Surgical Oncology    COLONOSCOPY      LYMPH NODE BIOPSY Right 5/5/2022    Procedure: LYMPHATIC MAPPING WITH BLUE AND RADIOACTIVE DYES, SENTINEL LYMPH  NODE BIOPSY, INJECTION IN OR AT 0930 BY DR Lalo Lucia;  Surgeon: Laura Quinteros MD;  Location: [facility];  Service: Surgical Oncology    US BREAST SHIRA  NEEDLE LOC RIGHT Right 4/12/2022    US GUIDANCE BREAST BIOPSY RIGHT EACH ADDITIONAL Right 4/12/2022    US GUIDED BREAST BIOPSY RIGHT COMPLETE Right 4/12/2022       Family History   Problem Relation Age of Onset    Breast cancer Mother 72    Heart disease Mother     Heart disease Father     No Known Problems Sister     Cancer Maternal Grandmother 80        either liver or pancreatic cancer not sure    No Known Problems Brother     No Known Problems Brother     No Known Problems Brother     Arrhythmia Son     No Known Problems Maternal Aunt     Uterine cancer Paternal Aunt 50    Breast cancer Paternal Aunt 46        maybe not sure       Social History   Social History     Substance and Sexual Activity   Alcohol Use Not Currently    Alcohol/week: 7 0 standard drinks    Types: 7 Glasses of wine per week    Comment: none in the last month     Social History     Substance and Sexual Activity   Drug Use Never     Social History     Tobacco Use   Smoking Status Never Smoker   Smokeless Tobacco Never Used         Meds/Allergies     Current Outpatient Medications:     cholecalciferol (VITAMIN D3) 1,000 units tablet, Take 1,000 Units by mouth daily (Patient not taking: No sig reported), Disp: , Rfl:     Digestive Enzymes (DIGESTIVE ENZYME PO), Take by mouth 3 (three) times a day Taken after meals (Patient not taking: No sig reported), Disp: , Rfl:     lisinopril (ZESTRIL) 20 mg tablet, Take 20 mg by mouth daily (Patient not taking: No sig reported), Disp: , Rfl:     NON FORMULARY, Kyolic (Patient not taking: No sig reported), Disp: , Rfl:     Probiotic Product (PROBIOTIC PO), Take by mouth in the morning (Patient not taking: No sig reported), Disp: , Rfl:     QUERCETIN PO, Take by mouth 3 (three) times a week (Patient not taking: No sig reported), Disp: , Rfl:   No Known Allergies      Review of Systems  Constitutional: Negative  HENT: Negative  Thrush  Treated by dentist   Eyes: Negative  Respiratory: Positive for cough (in the morning)  Cardiovascular: Negative  Gastrointestinal: Negative  Endocrine: Negative  Genitourinary: Positive for frequency     Musculoskeletal:        Discomfort right breast   Skin:        Surgical incisions are healing   Allergic/Immunologic: Negative  Neurological: Negative  Hematological: Negative  Psychiatric/Behavioral: Negative for sleep disturbance and suicidal ideas  The patient is nervous/anxious      OBJECTIVE:   Pulse 79   Temp (!) 97 4 °F (36 3 °C)   Resp 16   Wt 68 5 kg (151 lb)   SpO2 95%   BMI 25 13 kg/m²   Performance Status: Karnofsky: 100 - Fully active, able to carry on all pre-disease performed without restriction    Physical Exam  Vitals and nursing note reviewed  Constitutional:       General: She is not in acute distress  Appearance: She is well-developed  Eyes:      General: No scleral icterus  Cardiovascular:      Rate and Rhythm: Normal rate and regular rhythm  Heart sounds: Normal heart sounds  No murmur heard  Pulmonary:      Breath sounds: No wheezing, rhonchi or rales  Chest:   Breasts:      Right: No axillary adenopathy or supraclavicular adenopathy  Left: No axillary adenopathy or supraclavicular adenopathy  Comments: Breast examination demonstrates right breast with well-healed 6:00 lumpectomy scar associated with retraction resulting in asymmetry in contour  There is moderate fibrotic changes associated with scar  The right breast is slightly smaller in size  There are no palpable nodules or suspicious skin changes of the breasts bilaterally  Abdominal:      General: There is no distension  Palpations: Abdomen is soft  Tenderness: There is no abdominal tenderness  Musculoskeletal:      Right lower leg: No edema  Left lower leg: No edema  Lymphadenopathy:      Cervical: No cervical adenopathy  Upper Body:      Right upper body: No supraclavicular or axillary adenopathy  Left upper body: No supraclavicular or axillary adenopathy  Neurological:      Mental Status: She is alert and oriented to person, place, and time            RESULTS  Imaging Studies  Mammo diagnostic left w 3d & cad, US breast left limited (diagnostic)    Result Date: 9/6/2022  Narrative: DIAGNOSIS: Malignant neoplasm of lower-outer quadrant of right breast of female, estrogen receptor positive ([address] ); Nipple discharge in female TECHNIQUE: Digital diagnostic mammography was performed  Computer Aided Detection (CAD) analyzed all applicable images  Ultrasound of the left breast(s) was performed  COMPARISONS: Prior breast imaging dated: 02/28/2022, 12/18/2012, 12/14/2011, 11/13/2010, 03/11/2010, 03/11/2010, 02/22/2010, 09/21/2009, and 09/18/2008 RELEVANT HISTORY: Family Breast Cancer History: History of breast cancer in Mother, Paternal Aunt  Family Medical History: Family medical history includes breast cancer in 2 relatives (mother, paternal aunt)  Personal History: Hormone history includes birth control  Surgical history includes breast biopsy and lumpectomy  Medical history includes breast cancer  RISK ASSESSMENT: [facility] risk assessment reporting was suppressed due to the patient's history and/or demographic factors  TISSUE DENSITY: There are scattered areas of fibroglandular density  INDICATION: Masoud Alexandre is a 73 y o  female presenting for Left breast bloody nipple discharge  FINDINGS: There are no suspicious masses, grouped microcalcifications or areas of unexplained architectural distortion  The skin and nipple areolar complex are unremarkable  Targeted ultrasound demonstrates no evidence of intraductal mass  Clinical follow-up is recommended as the bloody nipple discharge appears to have stopped after 1 day  Impression: Clinical follow-up recommended for bloody nipple discharge which stopped after 1 day  If bloody nipple discharge remains chronic and spontaneous, MRI can be obtained for further evaluation  ASSESSMENT/BI-RADS CATEGORY: Left: 2 - Benign Overall: 2 - Benign RECOMMENDATION:      - Routine screening mammogram in 1 year for the left breast  Workstation ID: [id number]         ASSESSMENT  1   Malignant neoplasm of lower-outer quadrant of right breast of female, estrogen receptor positive ([facility] [address] )  Ambulatory referral to Radiation Oncology     Cancer Staging  Malignant neoplasm of lower-outer quadrant of right breast of female, estrogen receptor positive ([facility] [address] )  Staging form: Breast, AJCC 8th Edition  - Clinical: Stage IA (cT1c, cN0, cM0, G2, ER+, CO-, HER2-) - Unsigned  Histologic grading system: 3 grade system        PLAN/DISCUSSION  Rajesh Vargas is a 73y o  year old female with a history of stage IA ([id number]) 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 status post resection on 5/5/22 achieving widely negative margins  Tumor cells were ER positive, CO negative, and HER2 negative   MammaPrint returned as low risk  She was offered adjuvant radiation and endocrine therapy  She has refused both  She was recently worked up for left breast nippled discharge which was benign  She has been referred back for 3rd discussion regarding adjuvant radiation  I again recommended adjuvant radiation to the right breast for improved local regional control and potential survival benefit  I again explained that adjuvant radiation reduces the relative risk of recurrence within the ipsilateral breast by 60-70%  This protection will be lifelong  We discussed that without adjuvant endocrine therapy or radiation therapy that local control from surgery alone is considered inferior compared to mastectomy  Risk for local recurrence historically without adjuvant therapy would be between 10-15% at 10 years and expected to increase slowly with continued follow-up  EBCTCG has demonstrated improved survival at 15 years for woman with early stage breast cancer who receive radiation and have durable local control  Thus adjuvant radiation is in accordance with NCCN guidelines  I would offer adjuvant whole breast radiation to 40Gy with possible boost to 50Gy  The patient wished to know about APBI    I explained that she met pathologic criteria for APBI at time of her initial consultation, however, this technique requires accurate targeting of the lumpectomy cavity  Given her retraction and fibrosis, the cavity is likely visible but may not represent full extent of original cavity and I feel the technique will not lend itself well due to current anatomy  I do not feel she is a good candidate at this time due to dosimetric and technical constraints  The rationale and potential benefits, as well as the risks and acute and late side effects and potential toxicities of radiation were discussed with the patient at length  Side effects discussed included, but were not limited to: Fatigue, skin erythema, hyperpigmentation, desquamation, fibrosis, shrinkage of the breast resulting asymmetry, rib weakening, and pulmonary fibrosis  The patient was given the opportunity to ask questions and all questions were answered to her satisfaction  She stated that she was undecided and would call our office if she wished to pursue radiation  I had long discussion with the patient that this is her 3rd consultation for adjuvant radiation  Our recommendations have not changed  We are happy to answer questions, but as her treatment continues to be delayed further from time of surgery there may be deterioration of the benefit of radiation  The ideal start time would be within the first 2 months post surgery  Once delay becomes clearly prolonged, then the continued benefit of radiation becomes an area of discussion  As local failure is still not guaranteed even without radiation treatment, it is possible that she does not undergo therapy and does not recur  We would certainly hope for this outcome  It is also possible that she recur and have further treatment and her survival is not impacted, which is probable if she is compliant with her follow-up care   Failure, however, is associated with increased morbidity as she will require further therapy and recurrent disease can be more aggressive or similar  If she again decides to refuse radiation at this time, then we will respect her informed decision as she has had the pros, cons, and risks of each approach discussed with her at length and she has expressed understanding multiple times  She will call us if she decides to pursue radiation and is satisfied with this plan  We will be happy to see her again should the need arise  Rosalie Padgett MD  6/35/2282,73:67 PM      Portions of the record may have been created with voice recognition software  Occasional wrong word or "sound a like" substitutions may have occurred due to the inherent limitations of voice recognition software  Read the chart carefully and recognize, using context, where substitutions have occurred

## 2022-10-03 ENCOUNTER — TELEPHONE (OUTPATIENT)
Dept: HEMATOLOGY ONCOLOGY | Facility: CLINIC | Age: 67
End: 2022-10-03

## 2022-10-03 NOTE — TELEPHONE ENCOUNTER
Reached out to patient to schedule new patient appointment, left VM and sent out referral letter   Referral has been closed

## 2022-12-21 ENCOUNTER — TELEPHONE (OUTPATIENT)
Dept: FAMILY MEDICINE CLINIC | Facility: CLINIC | Age: 67
End: 2022-12-21

## 2022-12-21 NOTE — TELEPHONE ENCOUNTER
Called pt to schedule her next wellness visit after 01/18/23  Pt declined appt for now, she is going to [place] for a few months  Pt stated she will call back to schedule when she returns to [facility]  12/21/22  11:36 AM

## 2023-03-24 ENCOUNTER — HOSPITAL ENCOUNTER (OUTPATIENT)
Dept: MAMMOGRAPHY | Facility: CLINIC | Age: 68
End: 2023-03-24

## 2023-03-24 VITALS — WEIGHT: 147 LBS | HEIGHT: 65 IN | BODY MASS INDEX: 24.49 KG/M2

## 2023-03-24 DIAGNOSIS — Z17.0 MALIGNANT NEOPLASM OF LOWER-OUTER QUADRANT OF RIGHT BREAST OF FEMALE, ESTROGEN RECEPTOR POSITIVE (HCC): ICD-10-CM

## 2023-03-24 DIAGNOSIS — C50.511 MALIGNANT NEOPLASM OF LOWER-OUTER QUADRANT OF RIGHT BREAST OF FEMALE, ESTROGEN RECEPTOR POSITIVE (HCC): ICD-10-CM

## 2023-03-24 PROBLEM — Z98.890 HISTORY OF LUMPECTOMY OF RIGHT BREAST: Status: ACTIVE | Noted: 2023-03-24

## 2023-03-29 ENCOUNTER — TELEPHONE (OUTPATIENT)
Dept: HEMATOLOGY ONCOLOGY | Facility: CLINIC | Age: 68
End: 2023-03-29

## 2023-03-30 ENCOUNTER — TELEPHONE (OUTPATIENT)
Dept: SURGICAL ONCOLOGY | Facility: CLINIC | Age: 68
End: 2023-03-30

## 2023-03-30 NOTE — TELEPHONE ENCOUNTER
Called patient and discussed need for 6 month follow up  Dr Joshua Monk will do an exam every 6 months for the first 2 years after diagnosis and then every 1 year there after  Patient stated understanding and asked that I schedule follow up 2 weeks from now  Appointment made, patient aware of time, date, and location of appointment  Patient appreciative  All questions answered at this time

## 2023-07-17 NOTE — TELEPHONE ENCOUNTER
Lisinopril sent in  One time dose of Ativan for anxiety for patient to take the morning of the procedure       We can discuss more about this at her up coming appointment on 4/20/22    Gallito Ulrich [facility]  4/15/2022 4:19 PM
Spoke with pt, she is aware Lisinopril sent to the pharmacy for her, she is aware of advise as well      [facility]  04/15/22  4:55 PM
T/c from pt - pt has scheduled lumpectomy - she said she has had anxiety all her life and thinks her blood pressure will be too high for them to preform lumpectomy on scheduled date     Pt was prescribed blood pressure med from Dr Kylee Larry but never picked them up because she 'does not like to take drugs'  Pt is also requesting med for her anxiety - something low dose 'because she does not like drugs'   Pt also asks that no meds prescribed be dieretic because she drinks a lot of water throughout the day
Opt out

## 2023-08-03 ENCOUNTER — TELEPHONE (OUTPATIENT)
Dept: FAMILY MEDICINE CLINIC | Facility: CLINIC | Age: 68
End: 2023-08-03

## 2023-11-27 ENCOUNTER — HOSPITAL ENCOUNTER (EMERGENCY)
Facility: HOSPITAL | Age: 68
Discharge: HOME | End: 2023-11-27
Attending: EMERGENCY MEDICINE
Payer: MEDICARE

## 2023-11-27 ENCOUNTER — APPOINTMENT (EMERGENCY)
Dept: RADIOLOGY | Facility: HOSPITAL | Age: 68
End: 2023-11-27
Payer: MEDICARE

## 2023-11-27 VITALS
RESPIRATION RATE: 18 BRPM | SYSTOLIC BLOOD PRESSURE: 155 MMHG | OXYGEN SATURATION: 98 % | HEART RATE: 79 BPM | TEMPERATURE: 97.2 F | DIASTOLIC BLOOD PRESSURE: 85 MMHG

## 2023-11-27 DIAGNOSIS — R42 LIGHTHEADEDNESS: Primary | ICD-10-CM

## 2023-11-27 LAB
ALBUMIN SERPL BCP-MCNC: 4.8 G/DL (ref 3.5–5)
ALP SERPL-CCNC: 80 U/L (ref 34–104)
ALT SERPL W P-5'-P-CCNC: 15 U/L (ref 7–52)
ANION GAP SERPL CALCULATED.3IONS-SCNC: 9 MMOL/L
AST SERPL W P-5'-P-CCNC: 21 U/L (ref 13–39)
BASOPHILS # BLD AUTO: 0.05 THOUSANDS/ÂΜL (ref 0–0.1)
BASOPHILS NFR BLD AUTO: 1 % (ref 0–1)
BILIRUB SERPL-MCNC: 0.45 MG/DL (ref 0.2–1)
BILIRUB UR QL STRIP: NEGATIVE
BUN SERPL-MCNC: 14 MG/DL (ref 5–25)
CALCIUM SERPL-MCNC: 10.6 MG/DL (ref 8.4–10.2)
CARDIAC TROPONIN I PNL SERPL HS: 3 NG/L
CHLORIDE SERPL-SCNC: 103 MMOL/L (ref 96–108)
CLARITY UR: CLEAR
CO2 SERPL-SCNC: 23 MMOL/L (ref 21–32)
COLOR UR: COLORLESS
CREAT SERPL-MCNC: 0.59 MG/DL (ref 0.6–1.3)
EOSINOPHIL # BLD AUTO: 0.07 THOUSAND/ÂΜL (ref 0–0.61)
EOSINOPHIL NFR BLD AUTO: 1 % (ref 0–6)
ERYTHROCYTE [DISTWIDTH] IN BLOOD BY AUTOMATED COUNT: 12.5 % (ref 11.6–15.1)
GFR SERPL CREATININE-BSD FRML MDRD: 95 ML/MIN/1.73SQ M
GLUCOSE SERPL-MCNC: 114 MG/DL (ref 65–140)
GLUCOSE UR STRIP-MCNC: NEGATIVE MG/DL
HCT VFR BLD AUTO: 43.5 % (ref 34.8–46.1)
HGB BLD-MCNC: 14.7 G/DL (ref 11.5–15.4)
HGB UR QL STRIP.AUTO: NEGATIVE
IMM GRANULOCYTES # BLD AUTO: 0.01 THOUSAND/UL (ref 0–0.2)
IMM GRANULOCYTES NFR BLD AUTO: 0 % (ref 0–2)
KETONES UR STRIP-MCNC: NEGATIVE MG/DL
LEUKOCYTE ESTERASE UR QL STRIP: NEGATIVE
LYMPHOCYTES # BLD AUTO: 2.12 THOUSANDS/ÂΜL (ref 0.6–4.47)
LYMPHOCYTES NFR BLD AUTO: 29 % (ref 14–44)
MCH RBC QN AUTO: 32.2 PG (ref 26.8–34.3)
MCHC RBC AUTO-ENTMCNC: 33.8 G/DL (ref 31.4–37.4)
MCV RBC AUTO: 95 FL (ref 82–98)
MONOCYTES # BLD AUTO: 0.62 THOUSAND/ÂΜL (ref 0.17–1.22)
MONOCYTES NFR BLD AUTO: 9 % (ref 4–12)
NEUTROPHILS # BLD AUTO: 4.39 THOUSANDS/ÂΜL (ref 1.85–7.62)
NEUTS SEG NFR BLD AUTO: 60 % (ref 43–75)
NITRITE UR QL STRIP: NEGATIVE
NRBC BLD AUTO-RTO: 0 /100 WBCS
PH UR STRIP.AUTO: 5.5 [PH]
PLATELET # BLD AUTO: 242 THOUSANDS/UL (ref 149–390)
PMV BLD AUTO: 10.8 FL (ref 8.9–12.7)
POTASSIUM SERPL-SCNC: 4.2 MMOL/L (ref 3.5–5.3)
PROT SERPL-MCNC: 8.3 G/DL (ref 6.4–8.4)
PROT UR STRIP-MCNC: NEGATIVE MG/DL
RBC # BLD AUTO: 4.57 MILLION/UL (ref 3.81–5.12)
SODIUM SERPL-SCNC: 135 MMOL/L (ref 135–147)
SP GR UR STRIP.AUTO: 1 (ref 1–1.03)
UROBILINOGEN UR STRIP-ACNC: <2 MG/DL
WBC # BLD AUTO: 7.26 THOUSAND/UL (ref 4.31–10.16)

## 2023-11-27 PROCEDURE — 85025 COMPLETE CBC W/AUTO DIFF WBC: CPT | Performed by: EMERGENCY MEDICINE

## 2023-11-27 PROCEDURE — 84484 ASSAY OF TROPONIN QUANT: CPT | Performed by: EMERGENCY MEDICINE

## 2023-11-27 PROCEDURE — 71045 X-RAY EXAM CHEST 1 VIEW: CPT

## 2023-11-27 PROCEDURE — 81003 URINALYSIS AUTO W/O SCOPE: CPT | Performed by: EMERGENCY MEDICINE

## 2023-11-27 PROCEDURE — 99285 EMERGENCY DEPT VISIT HI MDM: CPT | Performed by: EMERGENCY MEDICINE

## 2023-11-27 PROCEDURE — 99284 EMERGENCY DEPT VISIT MOD MDM: CPT

## 2023-11-27 PROCEDURE — 93005 ELECTROCARDIOGRAM TRACING: CPT

## 2023-11-27 PROCEDURE — 80053 COMPREHEN METABOLIC PANEL: CPT | Performed by: EMERGENCY MEDICINE

## 2023-11-27 PROCEDURE — 36415 COLL VENOUS BLD VENIPUNCTURE: CPT

## 2023-11-27 NOTE — ED PROVIDER NOTES
History  Chief Complaint   Patient presents with    Dizziness     Patient reports for the last 10 days she has been feeling lightheaded when she gets out of bed. Patient denies chest pain. Patient denies shortness of breath. History provided by:  Patient  Dizziness  Quality:  Lightheadedness (Pt when she first wakes up for the past 9-10 days, wakes up with a brief, less than 1 min feeling of lightheadedness)  Severity:  Moderate  Onset quality:  Gradual  Duration:  1 week  Timing:  Intermittent  Progression:  Waxing and waning  Chronicity:  New  Context: standing up    Context: not when bending over, not with bowel movement, not with head movement, not with inactivity, not with loss of consciousness and not with medication    Context comment:  Pt when she wake up first thing in the morning, for less than a minutes feels lightheaded. Not vertigo or room spinning. Has NO other assoc neuro sx. No vision/speech change. No arm/leg weakness. No CP/SOB. No LH/dizzness/CP/SOB [place]. Relieved by:  Nothing  Worsened by:  Nothing  Associated symptoms: no blood in stool, no chest pain, no diarrhea, no headaches, no hearing loss, no nausea, no palpitations, no shortness of breath, no syncope, no tinnitus, no vision changes, no vomiting and no weakness    Risk factors: no anemia, no multiple medications and no new medications    Risk factors comment:  Pt has h/o breast CA in remission, does have h/o HTN as well but does not take meds takes homeopathic meds instead      Prior to Admission Medications   Prescriptions Last Dose Informant Patient Reported? Taking?    Digestive Enzymes (DIGESTIVE ENZYME PO)  Self Yes No   Sig: Take by mouth 3 (three) times a day Taken after meals   Patient not taking: Reported on 9/14/2022   NON FORMULARY  Self Yes No   Sig: Kyolic   Patient not taking: Reported on 9/14/2022   Probiotic Product (PROBIOTIC PO)  Self Yes No   Sig: Take by mouth in the morning   Patient not taking: Reported on 2022   QUERCETIN PO  Self Yes No   Sig: Take by mouth 3 (three) times a week   Patient not taking: Reported on 2022   cholecalciferol (VITAMIN D3) 1,000 units tablet  Self Yes No   Sig: Take 1,000 Units by mouth daily   Patient not taking: Reported on 2022   fluticasone (FLONASE) 50 mcg/act nasal spray  Self No No   Si sprays each nostril once daily.    Patient not taking: Reported on 2023   lisinopril (ZESTRIL) 20 mg tablet  Self Yes No   Sig: Take 20 mg by mouth daily   Patient not taking: Reported on 2022      Facility-Administered Medications: None       Past Medical History:   Diagnosis Date    Breast cancer ([address])         Ear problems     Hyperlipidemia     Hypertension        Past Surgical History:   Procedure Laterality Date    APPENDECTOMY      BREAST BIOPSY Left 2002    benign    BREAST LUMPECTOMY Right 2022    Procedure: SHAILESH  DIRECTED LUMPECTOMY;  Surgeon: Radha Davis MD;  Location: [facility];  Service: Surgical Oncology    COLONOSCOPY      LYMPH NODE BIOPSY Right 2022    Procedure: LYMPHATIC MAPPING WITH BLUE AND RADIOACTIVE DYES, SENTINEL LYMPH  NODE BIOPSY, INJECTION IN OR AT 0930 BY DR Danitza Kaiser;  Surgeon: Radha Davis MD;  Location: [facility];  Service: Surgical Oncology    US BREAST SHAILESH  NEEDLE LOC RIGHT Right 2022    US GUIDANCE BREAST BIOPSY RIGHT EACH ADDITIONAL Right 2022    US GUIDED BREAST BIOPSY RIGHT COMPLETE Right 2022       Family History   Problem Relation Age of Onset    Breast cancer Mother 72    Heart disease Mother     Heart disease Father     No Known Problems Sister     Cancer Maternal Grandmother 80        either liver or pancreatic cancer not sure    No Known Problems Brother     No Known Problems Brother     No Known Problems Brother     Arrhythmia Son     No Known Problems Maternal Aunt     Uterine cancer Paternal Aunt 50    Breast cancer Paternal Aunt 46        maybe not sure     I have reviewed and agree with the history as documented. E-Cigarette/Vaping    E-Cigarette Use Never User      E-Cigarette/Vaping Substances    Nicotine No     THC No     CBD No     Flavoring No     Other No     Unknown No      Social History     Tobacco Use    Smoking status: Never    Smokeless tobacco: Never   Vaping Use    Vaping Use: Never used   Substance Use Topics    Alcohol use: Yes     Alcohol/week: 4.0 standard drinks of alcohol     Types: 4 Glasses of wine per week     Comment: 4 glasses per night    Drug use: Never       Review of Systems   HENT:  Negative for hearing loss and tinnitus. Respiratory:  Negative for shortness of breath. Cardiovascular:  Negative for chest pain, palpitations and syncope. Gastrointestinal:  Negative for blood in stool, diarrhea, nausea and vomiting. Neurological:  Positive for dizziness. Negative for weakness and headaches. All other systems reviewed and are negative. Physical Exam  Physical Exam  Vitals and nursing note reviewed. Constitutional:       General: She is not in acute distress. Appearance: She is well-developed. She is not ill-appearing or diaphoretic. HENT:      Head: Normocephalic and atraumatic. Nose: Nose normal.   Eyes:      Extraocular Movements: Extraocular movements intact. Conjunctiva/sclera: Conjunctivae normal.   Cardiovascular:      Rate and Rhythm: Normal rate and regular rhythm. Heart sounds: Normal heart sounds. Pulmonary:      Effort: Pulmonary effort is normal. No respiratory distress. Breath sounds: Normal breath sounds. No wheezing. Abdominal:      General: There is no distension. Palpations: Abdomen is soft. Tenderness: There is no abdominal tenderness. Musculoskeletal:         General: No deformity. Normal range of motion. Cervical back: Neck supple. Skin:     General: Skin is warm and dry. Neurological:      General: No focal deficit present.       Mental Status: She is alert and oriented to person, place, and time. Cranial Nerves: No cranial nerve deficit. Motor: No weakness. Coordination: Coordination normal.      Gait: Gait normal.   Psychiatric:         Mood and Affect: Mood is anxious.          Vital Signs  ED Triage Vitals   Temperature Pulse Respirations Blood Pressure SpO2   11/27/23 1213 11/27/23 1213 11/27/23 1213 11/27/23 1339 11/27/23 1213   (!) 97.2 °F (36.2 °C) 100 18 162/88 98 %      Temp src Heart Rate Source Patient Position - Orthostatic VS BP Location FiO2 (%)   -- 11/27/23 1213 11/27/23 1213 11/27/23 1213 --    Monitor Sitting Left arm       Pain Score       --                  Vitals:    11/27/23 1213 11/27/23 1339 11/27/23 1503   BP:  162/88 155/85   Pulse: 100  79   Patient Position - Orthostatic VS: Sitting Sitting Sitting         Visual Acuity  Visual Acuity      Flowsheet Row Most Recent Value   L Pupil Size (mm) 2   R Pupil Size (mm) 2            ED Medications  Medications - No data to display    Diagnostic Studies  Results Reviewed       Procedure Component Value Units Date/Time    UA w Reflex to Microscopic w Reflex to Culture [[id number]] Collected: 11/27/23 1340    Lab Status: Final result Specimen: Urine, Clean Catch Updated: 11/27/23 1353     Color, UA Colorless     Clarity, UA Clear     Specific Gravity, UA 1.005     pH, UA 5.5     Leukocytes, UA Negative     Nitrite, UA Negative     Protein, UA Negative mg/dl      Glucose, UA Negative mg/dl      Ketones, UA Negative mg/dl      Urobilinogen, UA <2.0 mg/dl      Bilirubin, UA Negative     Occult Blood, UA Negative    HS Troponin 0hr (reflex protocol) [[id number]]  (Normal) Collected: 11/27/23 1224    Lab Status: Final result Specimen: Blood from Arm, Left Updated: 11/27/23 1309     hs TnI 0hr 3 ng/L     Comprehensive metabolic panel [[id number]]  (Abnormal) Collected: 11/27/23 1224    Lab Status: Final result Specimen: Blood from Arm, Left Updated: 11/27/23 1304     Sodium 135 mmol/L      Potassium 4.2 mmol/L      Chloride 103 mmol/L      CO2 23 mmol/L      ANION GAP 9 mmol/L      BUN 14 mg/dL      Creatinine 0.59 mg/dL      Glucose 114 mg/dL      Calcium 10.6 mg/dL      AST 21 U/L      ALT 15 U/L      Alkaline Phosphatase 80 U/L      Total Protein 8.3 g/dL      Albumin 4.8 g/dL      Total Bilirubin 0.45 mg/dL      eGFR 95 ml/min/1.73sq m     Narrative:      National Kidney Disease Foundation guidelines for Chronic Kidney Disease (CKD):     Stage 1 with normal or high GFR (GFR > 90 mL/min/1.73 square meters)    Stage 2 Mild CKD (GFR = 60-89 mL/min/1.73 square meters)    Stage 3A Moderate CKD (GFR = 45-59 mL/min/1.73 square meters)    Stage 3B Moderate CKD (GFR = 30-44 mL/min/1.73 square meters)    Stage 4 Severe CKD (GFR = 15-29 mL/min/1.73 square meters)    Stage 5 End Stage CKD (GFR <15 mL/min/1.73 square meters)  Note: GFR calculation is accurate only with a steady state creatinine    CBC and differential [[id number]] Collected: 11/27/23 1224    Lab Status: Final result Specimen: Blood from Arm, Left Updated: 11/27/23 1240     WBC 7.26 Thousand/uL      RBC 4.57 Million/uL      Hemoglobin 14.7 g/dL      Hematocrit 43.5 %      MCV 95 fL      MCH 32.2 pg      MCHC 33.8 g/dL      RDW 12.5 %      MPV 10.8 fL      Platelets 716 Thousands/uL      nRBC 0 /100 WBCs      Neutrophils Relative 60 %      Immat GRANS % 0 %      Lymphocytes Relative 29 %      Monocytes Relative 9 %      Eosinophils Relative 1 %      Basophils Relative 1 %      Neutrophils Absolute 4.39 Thousands/µL      Immature Grans Absolute 0.01 Thousand/uL      Lymphocytes Absolute 2.12 Thousands/µL      Monocytes Absolute 0.62 Thousand/µL      Eosinophils Absolute 0.07 Thousand/µL      Basophils Absolute 0.05 Thousands/µL                    XR chest 1 view portable   ED Interpretation by Haylie Guillen MD (11/27 1341)   NAD      Final Result by Guanako Diamond MD (11/27 8085)      No acute cardiopulmonary disease. Workstation performed: [id number]                    Procedures  ECG 12 Lead Documentation Only    Date/Time: 11/27/2023 1:36 PM    Performed by: Pierce Wilson MD  Authorized by: Pierce Wilson MD    Indications / Diagnosis:  LH  ECG reviewed by me, the ED Provider: yes    Patient location:  ED  Rate:     ECG rate:  101    ECG rate assessment: tachycardic    Rhythm:     Rhythm: sinus tachycardia    Other findings:     Other findings: LVH with strain             ED Course             HEART Risk Score      Flowsheet Row Most Recent Value   Heart Score Risk Calculator    History 0 Filed at: 11/27/2023 1343   ECG 1 Filed at: 11/27/2023 1343   Age 2 Filed at: 11/27/2023 1343   Risk Factors 1 Filed at: 11/27/2023 1343   Troponin 0 Filed at: 11/27/2023 1343   HEART Score 4 Filed at: 11/27/2023 1343                                        Medical Decision Making  25-year-old female is coming in with complaint of intermittent brief lightheadedness/dizziness when she first wakes up in the morning. When she first wakes up and goes to move in the morning for less than 1 minute she gets a brief feeling of dizziness without any other neurologic symptoms that she is slow to change her position or get out of bed to make sure she does not fall. She has no associated chest pain or shortness of breath. She has no associated vision change or speech change. She has no associated numbness or weakness and no associated neck pain or abdominal pain. She does not describe the dizziness as actual vertigo when asked about it it is not the room or her spinning. Patient has history of high blood pressure but she wants to cut her blood pressure medications and then she was getting feeling of side effects from them so she takes levels instead of taking medications. She just wanted to come get checked out and evaluated for it.   Patient recently went on a 1 mile hike with friends up a hill and down a hill and she had absolutely no dizziness no chest pain or shortness of breath during that exertional.  She actually has no symptoms at present. Patient always has significantly elevated blood pressure although she says that those automated blood pressure cuffs always continue to tighten and make her blood pressure higher and higher and it tightens, so then eventually with getting a manual cuff her blood pressure was elevated to the 160s but less so than it has been and her heart rate was improved because patient gets extremely anxious and uncomfortable with the blood pressure cuffs and the blood pressure cuff actually gave her some petechiae in her antecubital.  Patient was concerned this brief episode of dizziness could potentially be a stroke but she had no other neurologic symptoms discussed with her full TIA and stroke work-up with CTA and MRIs but brief less than 1 minute nonvertiginous dizziness when she first changes position in the morning is less concerning than patient declining any CTA or neurologic imaging since she actually has no neurologic symptoms. Discussed with her the dizziness in the morning could be changes of position but we evaluate for postural dizziness as well with cardiac work-up with EKG and troponins along for dehydration with basic labs and anemia like CBC CMP. Discussed with her and recommend following up with her PCP for monitoring of her blood pressure and if it has been sometime for stress test and carotid ultrasound as an outpatient which is what patient would like to do. Discussed with her other more concerning signs of stroke including vision speech change vertiginous type dizziness that lasts more than a minute and facial droop or arm or leg weakness which she has had none of these symptoms. Amount and/or Complexity of Data Reviewed  Labs: ordered. Radiology: ordered and independent interpretation performed. ECG/medicine tests: ordered and independent interpretation performed. Disposition  Final diagnoses:   Lightheadedness     Time reflects when diagnosis was documented in both MDM as applicable and the Disposition within this note       Time User Action Codes Description Comment    11/27/2023  2:55 PM [address]           ED Disposition       ED Disposition   Discharge    Condition   Stable    Date/Time   Mon Nov 27, 2023 [address] [facility] discharge to home/self care. Follow-up Information       Follow up With Specialties Details Why Contact Info Additional Information    Leandro Baez, DO Family Medicine Go to  If symptoms worsen [address] [facility]   [address] [facility] [address] [facility] Emergency Department Emergency Medicine Go to  If symptoms worsen [address] [facility] [address] [facility] Emergency Department, [place], [place], [address]            Discharge Medication List as of 11/27/2023  2:55 PM        CONTINUE these medications which have NOT CHANGED    Details   cholecalciferol (VITAMIN D3) 1,000 units tablet Take 1,000 Units by mouth daily, Historical Med      Digestive Enzymes (DIGESTIVE ENZYME PO) Take by mouth 3 (three) times a day Taken after meals, Historical Med      fluticasone (FLONASE) 50 mcg/act nasal spray 2 sprays each nostril once daily. , Normal      lisinopril (ZESTRIL) 20 mg tablet Take 20 mg by mouth daily, Historical Med      NON FORMULARY Kyolic, Historical Med      Probiotic Product (PROBIOTIC PO) Take by mouth in the morning, Historical Med      QUERCETIN PO Take by mouth 3 (three) times a week, Historical Med             No discharge procedures on file.     PDMP Review         Value Time User    PDMP Reviewed  Yes 4/20/2022 11:30 AM Kimberly Poon MD            ED Provider  Electronically Signed by             Mily Esteves MD  11/29/23 [facility]

## 2023-11-27 NOTE — ED NOTES
Patient refusing blood pressure at this time. RN made 2 attempts. Patient will allow RN to try again in 15 minutes.      Jaelyn Gandhi RN  11/27/23 2400

## 2023-11-28 LAB
ATRIAL RATE: 101 BPM
P AXIS: 77 DEGREES
PR INTERVAL: 196 MS
QRS AXIS: 61 DEGREES
QRSD INTERVAL: 88 MS
QT INTERVAL: 350 MS
QTC INTERVAL: 453 MS
T WAVE AXIS: 68 DEGREES
VENTRICULAR RATE: 101 BPM

## 2023-12-06 ENCOUNTER — TELEPHONE (OUTPATIENT)
Dept: FAMILY MEDICINE CLINIC | Facility: CLINIC | Age: 68
End: 2023-12-06

## 2023-12-06 DIAGNOSIS — E78.5 HYPERLIPIDEMIA, UNSPECIFIED HYPERLIPIDEMIA TYPE: ICD-10-CM

## 2023-12-06 DIAGNOSIS — R73.01 IMPAIRED FASTING GLUCOSE: ICD-10-CM

## 2023-12-06 DIAGNOSIS — I10 PRIMARY HYPERTENSION: Primary | ICD-10-CM

## 2023-12-06 NOTE — TELEPHONE ENCOUNTER
Labs have been ordered. Is patient able to come for visit before the end of the year?  Okay for 20 min [address] [facility],[address], [facility]  12/6/2023 1:42 PM

## 2023-12-06 NOTE — TELEPHONE ENCOUNTER
T/c from pt -- scheduled appt for January and would like to have orders for her to go for her labs before her appt. Pt asking if Dr Mark Fallon can place the orders for her routine labs and to get her vitamin D levels checked as well. Pt also asking if there is an ENT associated with [facility] that she would be able to go to.      Please advise

## 2023-12-12 ENCOUNTER — APPOINTMENT (OUTPATIENT)
Dept: LAB | Facility: CLINIC | Age: 68
End: 2023-12-12
Payer: MEDICARE

## 2023-12-12 ENCOUNTER — TELEPHONE (OUTPATIENT)
Dept: FAMILY MEDICINE CLINIC | Facility: CLINIC | Age: 68
End: 2023-12-12

## 2023-12-12 DIAGNOSIS — R73.01 IMPAIRED FASTING GLUCOSE: ICD-10-CM

## 2023-12-12 DIAGNOSIS — I10 PRIMARY HYPERTENSION: ICD-10-CM

## 2023-12-12 DIAGNOSIS — E78.5 HYPERLIPIDEMIA, UNSPECIFIED HYPERLIPIDEMIA TYPE: ICD-10-CM

## 2023-12-12 LAB
ALBUMIN SERPL BCP-MCNC: 4.7 G/DL (ref 3.5–5)
ALP SERPL-CCNC: 71 U/L (ref 34–104)
ALT SERPL W P-5'-P-CCNC: 16 U/L (ref 7–52)
ANION GAP SERPL CALCULATED.3IONS-SCNC: 6 MMOL/L
AST SERPL W P-5'-P-CCNC: 19 U/L (ref 13–39)
BILIRUB SERPL-MCNC: 0.48 MG/DL (ref 0.2–1)
BUN SERPL-MCNC: 11 MG/DL (ref 5–25)
CALCIUM SERPL-MCNC: 10.3 MG/DL (ref 8.4–10.2)
CHLORIDE SERPL-SCNC: 102 MMOL/L (ref 96–108)
CHOLEST SERPL-MCNC: 243 MG/DL
CO2 SERPL-SCNC: 31 MMOL/L (ref 21–32)
CREAT SERPL-MCNC: 0.49 MG/DL (ref 0.6–1.3)
EST. AVERAGE GLUCOSE BLD GHB EST-MCNC: 105 MG/DL
GFR SERPL CREATININE-BSD FRML MDRD: 101 ML/MIN/1.73SQ M
GLUCOSE P FAST SERPL-MCNC: 104 MG/DL (ref 65–99)
HBA1C MFR BLD: 5.3 %
HDLC SERPL-MCNC: 68 MG/DL
LDLC SERPL CALC-MCNC: 158 MG/DL (ref 0–100)
NONHDLC SERPL-MCNC: 175 MG/DL
POTASSIUM SERPL-SCNC: 4 MMOL/L (ref 3.5–5.3)
PROT SERPL-MCNC: 7.4 G/DL (ref 6.4–8.4)
SODIUM SERPL-SCNC: 139 MMOL/L (ref 135–147)
TRIGL SERPL-MCNC: 87 MG/DL

## 2023-12-12 PROCEDURE — 80061 LIPID PANEL: CPT

## 2023-12-12 PROCEDURE — 83036 HEMOGLOBIN GLYCOSYLATED A1C: CPT

## 2023-12-12 PROCEDURE — 36415 COLL VENOUS BLD VENIPUNCTURE: CPT

## 2023-12-12 PROCEDURE — 80053 COMPREHEN METABOLIC PANEL: CPT

## 2023-12-12 NOTE — TELEPHONE ENCOUNTER
Had a UA done less than 1 month ago that was normal.     Is she having urinary symptoms?     Paulette Medina DO  [facility]  12/12/2023 10:31 AM

## 2023-12-12 NOTE — TELEPHONE ENCOUNTER
Pt came to office -- got bloodwork done at hospital this AM and left urine sample, as she was under the impression that was to be checked as well, as a follow up to her ED visit. There is not an order in the system. Pt is requesting it be entered asap so the urine she left at the lab can be tested.

## 2023-12-12 NOTE — TELEPHONE ENCOUNTER
Patient came into office requesting a BP check. Patient states in the hospital they used a electric BP machine, which they did  3 times with no reading. Did patients BP and it was 172/88. And pt declined appt to discuss BP with Dr Zack Lafleur at this time.  Patient states she is not on BP medication and will not start taking any to control BP

## 2023-12-12 NOTE — TELEPHONE ENCOUNTER
Spoke with pt. Patient is not having urinary symptoms.  Patient states she just wanted to make sure everything was normal.

## 2023-12-19 ENCOUNTER — RA CDI HCC (OUTPATIENT)
Dept: OTHER | Facility: HOSPITAL | Age: 68
End: 2023-12-19

## 2023-12-27 ENCOUNTER — OFFICE VISIT (OUTPATIENT)
Dept: FAMILY MEDICINE CLINIC | Facility: CLINIC | Age: 68
End: 2023-12-27
Payer: MEDICARE

## 2023-12-27 VITALS
OXYGEN SATURATION: 99 % | SYSTOLIC BLOOD PRESSURE: 148 MMHG | DIASTOLIC BLOOD PRESSURE: 82 MMHG | TEMPERATURE: 99 F | HEART RATE: 88 BPM | HEIGHT: 65 IN | BODY MASS INDEX: 23.82 KG/M2 | WEIGHT: 143 LBS

## 2023-12-27 DIAGNOSIS — Z17.0 MALIGNANT NEOPLASM OF LOWER-OUTER QUADRANT OF RIGHT BREAST OF FEMALE, ESTROGEN RECEPTOR POSITIVE: ICD-10-CM

## 2023-12-27 DIAGNOSIS — Z00.00 ENCOUNTER FOR MEDICARE ANNUAL WELLNESS EXAM: Primary | ICD-10-CM

## 2023-12-27 DIAGNOSIS — C50.511 MALIGNANT NEOPLASM OF LOWER-OUTER QUADRANT OF RIGHT BREAST OF FEMALE, ESTROGEN RECEPTOR POSITIVE: ICD-10-CM

## 2023-12-27 DIAGNOSIS — I51.7 BIATRIAL ENLARGEMENT: ICD-10-CM

## 2023-12-27 DIAGNOSIS — E78.5 HYPERLIPIDEMIA, UNSPECIFIED HYPERLIPIDEMIA TYPE: ICD-10-CM

## 2023-12-27 DIAGNOSIS — I10 PRIMARY HYPERTENSION: ICD-10-CM

## 2023-12-27 PROCEDURE — 99213 OFFICE O/P EST LOW 20 MIN: CPT | Performed by: FAMILY MEDICINE

## 2023-12-27 PROCEDURE — G0438 PPPS, INITIAL VISIT: HCPCS | Performed by: FAMILY MEDICINE

## 2023-12-27 NOTE — PROGRESS NOTES
Assessment and Plan:     Problem List Items Addressed This Visit       Hyperlipidemia  Lipids improved but still elevated, not willing to take medications.       Malignant neoplasm of lower-outer quadrant of right breast of female, estrogen receptor positive  Patient had lumpectomy. Declined radiation and estrogen blocker.       Primary hypertension  BP remained elevated and not controlled within goal. Patient is not willing to take medication.      Other Visit Diagnoses       Encounter for Medicare annual wellness exam    -  Primary            Depression Screening and Follow-up Plan: Patient was screened for depression during today's encounter. They screened negative with a PHQ-2 score of 0.    Preventive health issues were discussed with patient, and age appropriate screening tests were ordered as noted in patient's After Visit Summary.  Personalized health advice and appropriate referrals for health education or preventive services given if needed, as noted in patient's After Visit Summary.     History of Present Illness:     Patient presents for a Medicare Wellness Visit    HPI   Patient Care Team:  Dang Lozano DO as PCP - General (Family Medicine)  Breanna Branch MD as Surgeon (Surgical Oncology)  Gris Duncan RN as Registered Nurse (Oncology)  Jazlyn Singh MD (Radiation Oncology)    Hypertension  Patient presents for follow-up of hypertension. Home blood pressure readings:  intermittently, reports that she has had a range of numbers . Patient is exercising and is adherent to low salt diet. Use of agents associated with hypertension include none.    Symptoms  [] Chest Pain  [] Dyspnea  [] Orthopnea    [] Blurred Vision  [] Palpitations  [] Headache  [] Peripheral Edema  [] Fatigue End Organ Damage  [] Hx of MI  [] Hx of Stroke  [] Hx of TIA    [] Heart Failure  [] LVH  [] CKD  [] PAD  [] Retinopathy Last BP's  BP Readings from Last 3 Encounters:   12/27/23 148/82   11/27/23 155/85   04/19/23  150/86        She is not willing to take BP medications.     Review of Systems:     Review of Systems     Problem List:     Patient Active Problem List   Diagnosis   • Hyperlipidemia   • Impaired fasting glucose   • Malignant neoplasm of lower-outer quadrant of right breast of female, estrogen receptor positive    • Primary hypertension   • Breast lump or mass   • Family history of breast cancer   • Nipple discharge in female   • History of lumpectomy of right breast      Past Medical and Surgical History:     Past Medical History:   Diagnosis Date   • Breast cancer (HCC)     2022   • Ear problems    • Hyperlipidemia    • Hypertension      Past Surgical History:   Procedure Laterality Date   • APPENDECTOMY     • BREAST BIOPSY Left 2002    benign   • BREAST LUMPECTOMY Right 5/5/2022    Procedure: SHAILESH  DIRECTED LUMPECTOMY;  Surgeon: Breanna Branch MD;  Location: [facility];  Service: Surgical Oncology   • COLONOSCOPY     • LYMPH NODE BIOPSY Right 5/5/2022    Procedure: LYMPHATIC MAPPING WITH BLUE AND RADIOACTIVE DYES, SENTINEL LYMPH  NODE BIOPSY, INJECTION IN OR AT 0930 BY DR BRANCH;  Surgeon: Breanna Branch MD;  Location: [facility];  Service: Surgical Oncology   • US BREAST SHAILESH  NEEDLE LOC RIGHT Right 4/12/2022   • US GUIDANCE BREAST BIOPSY RIGHT EACH ADDITIONAL Right 4/12/2022   • US GUIDED BREAST BIOPSY RIGHT COMPLETE Right 4/12/2022      Family History:     Family History   Problem Relation Age of Onset   • Breast cancer Mother 65   • Heart disease Mother    • Heart disease Father    • No Known Problems Sister    • Cancer Maternal Grandmother 82        either liver or pancreatic cancer not sure   • No Known Problems Brother    • No Known Problems Brother    • No Known Problems Brother    • Arrhythmia Son    • No Known Problems Maternal Aunt    • Uterine cancer Paternal Aunt 48   • Breast cancer Paternal Aunt 49        maybe not sure      Social History:     Social History      Socioeconomic History   • Marital status: /Civil Union     Spouse name: None   • Number of children: None   • Years of education: None   • Highest education level: None   Occupational History   • None   Tobacco Use   • Smoking status: Never   • Smokeless tobacco: Never   Vaping Use   • Vaping status: Never Used   Substance and Sexual Activity   • Alcohol use: Yes     Alcohol/week: 4.0 standard drinks of alcohol     Types: 4 Glasses of wine per week     Comment: 4 glasses per night   • Drug use: Never   • Sexual activity: Yes     Partners: Male   Other Topics Concern   • None   Social History Narrative   • None     Social Determinants of Health     Financial Resource Strain: Low Risk  (12/27/2023)    Overall Financial Resource Strain (CARDIA)    • Difficulty of Paying Living Expenses: Not hard at all   Food Insecurity: Not on file   Transportation Needs: No Transportation Needs (12/27/2023)    PRAPARE - Transportation    • Lack of Transportation (Medical): No    • Lack of Transportation (Non-Medical): No   Physical Activity: Not on file   Stress: Not on file   Social Connections: Not on file   Intimate Partner Violence: Not on file   Housing Stability: Not on file      Medications and Allergies:     Current Outpatient Medications   Medication Sig Dispense Refill   • cholecalciferol (VITAMIN D3) 1,000 units tablet Take 1,000 Units by mouth daily (Patient not taking: Reported on 9/14/2022)     • Digestive Enzymes (DIGESTIVE ENZYME PO) Take by mouth 3 (three) times a day Taken after meals (Patient not taking: Reported on 9/14/2022)     • fluticasone (FLONASE) 50 mcg/act nasal spray 2 sprays each nostril once daily. (Patient not taking: Reported on 4/19/2023) 16 g 3   • lisinopril (ZESTRIL) 20 mg tablet Take 20 mg by mouth daily (Patient not taking: Reported on 8/18/2022)     • NON FORMULARY Kyolic (Patient not taking: Reported on 9/14/2022)     • Probiotic Product (PROBIOTIC PO) Take by mouth in the morning  (Patient not taking: Reported on 9/14/2022)     • QUERCETIN PO Take by mouth 3 (three) times a week (Patient not taking: Reported on 9/14/2022)       No current facility-administered medications for this visit.     No Known Allergies   Immunizations:       There is no immunization history on file for this patient.   Health Maintenance:         Topic Date Due   • Hepatitis C Screening  Never done   • Breast Cancer Screening: Mammogram  03/24/2024   • Colorectal Cancer Screening  01/24/2025         Topic Date Due   • COVID-19 Vaccine (1) Never done   • Pneumococcal Vaccine: 65+ Years (1 - PCV) Never done   • Influenza Vaccine (1) Never done      Medicare Screening Tests and Risk Assessments:     Gaby is here for her Subsequent Wellness visit.     Health Risk Assessment:   Patient rates overall health as excellent. Patient feels that their physical health rating is much better. Patient is very satisfied with their life. Eyesight was rated as same. Hearing was rated as same. Patient feels that their emotional and mental health rating is same. Patients states they are never, rarely angry. Patient states they are never, rarely unusually tired/fatigued. Pain experienced in the last 7 days has been some. Patient states that she has experienced no weight loss or gain in last 6 months.     Depression Screening:   PHQ-2 Score: 0      Fall Risk Screening:   In the past year, patient has experienced: no history of falling in past year      Urinary Incontinence Screening:   Patient has not leaked urine accidently in the last six months.     Home Safety:  Patient does not have trouble with stairs inside or outside of their home. Patient has working smoke alarms and has working carbon monoxide detector. Home safety hazards include: none.     Nutrition:   Current diet is Regular.     Medications:   Patient is not currently taking any over-the-counter supplements. Patient is able to manage medications.     Activities of Daily  "Living (ADLs)/Instrumental Activities of Daily Living (IADLs):   Walk and transfer into and out of bed and chair?: Yes  Dress and groom yourself?: Yes    Bathe or shower yourself?: Yes    Feed yourself? Yes  Do your laundry/housekeeping?: Yes  Manage your money, pay your bills and track your expenses?: Yes  Make your own meals?: Yes    Do your own shopping?: Yes    Previous Hospitalizations:   Any hospitalizations or ED visits within the last 12 months?: Yes    How many hospitalizations have you had in the last year?: 1-2    Advance Care Planning:   Living will: No    Durable POA for healthcare: No    Advanced directive: No      Cognitive Screening:   Provider or family/friend/caregiver concerned regarding cognition?: No    PREVENTIVE SCREENINGS      Cardiovascular Screening:    General: Screening Not Indicated and History Lipid Disorder      Diabetes Screening:     General: Screening Current      Colorectal Cancer Screening:     General: Screening Current      Breast Cancer Screening:     General: History Breast Cancer      Cervical Cancer Screening:    General: Screening Not Indicated      Lung Cancer Screening:     General: Screening Not Indicated    Screening, Brief Intervention, and Referral to Treatment (SBIRT)    Screening  Typical number of drinks in a day: 2  Typical number of drinks in a week: 12  Interpretation: Low risk drinking behavior.    Single Item Drug Screening:  How often have you used an illegal drug (including marijuana) or a prescription medication for non-medical reasons in the past year? never    Single Item Drug Screen Score: 0  Interpretation: Negative screen for possible drug use disorder    No results found.     Physical Exam:     /82   Pulse 88   Temp 99 °F (37.2 °C)   Ht 5' 5\" (1.651 m)   Wt 64.9 kg (143 lb)   SpO2 99%   BMI 23.80 kg/m²     Physical Exam  Vitals reviewed.   Constitutional:       General: She is not in acute distress.     Appearance: Normal appearance. "   HENT:      Head: Normocephalic and atraumatic.      Right Ear: External ear normal.      Left Ear: External ear normal.      Nose: Nose normal.      Mouth/Throat:      Mouth: Mucous membranes are moist.   Eyes:      Extraocular Movements: Extraocular movements intact.      Conjunctiva/sclera: Conjunctivae normal.   Cardiovascular:      Rate and Rhythm: Normal rate and regular rhythm.      Heart sounds: Normal heart sounds.   Pulmonary:      Effort: Pulmonary effort is normal.      Breath sounds: Normal breath sounds.   Abdominal:      General: Bowel sounds are normal. There is no distension.      Palpations: Abdomen is soft.      Tenderness: There is no abdominal tenderness.   Musculoskeletal:      Cervical back: Neck supple.      Right lower leg: No edema.      Left lower leg: No edema.   Lymphadenopathy:      Cervical: No cervical adenopathy.   Skin:     General: Skin is warm.      Capillary Refill: Capillary refill takes less than 2 seconds.      Findings: No rash.   Neurological:      Mental Status: She is alert. Mental status is at baseline.        Dang Brown, DO

## 2024-01-13 ENCOUNTER — HOSPITAL ENCOUNTER (EMERGENCY)
Facility: HOSPITAL | Age: 69
Discharge: HOME | End: 2024-01-13
Attending: EMERGENCY MEDICINE
Payer: MEDICARE

## 2024-01-13 VITALS
DIASTOLIC BLOOD PRESSURE: 106 MMHG | RESPIRATION RATE: 20 BRPM | TEMPERATURE: 97.7 F | SYSTOLIC BLOOD PRESSURE: 200 MMHG | HEART RATE: 95 BPM | OXYGEN SATURATION: 97 %

## 2024-01-13 DIAGNOSIS — R42 LIGHTHEADEDNESS: Primary | ICD-10-CM

## 2024-01-13 LAB
ANION GAP SERPL CALCULATED.3IONS-SCNC: 7 MMOL/L
ATRIAL RATE: 85 BPM
BASOPHILS # BLD AUTO: 0.04 THOUSANDS/ÂΜL (ref 0–0.1)
BASOPHILS NFR BLD AUTO: 1 % (ref 0–1)
BUN SERPL-MCNC: 15 MG/DL (ref 5–25)
CALCIUM SERPL-MCNC: 10.4 MG/DL (ref 8.4–10.2)
CARDIAC TROPONIN I PNL SERPL HS: 8 NG/L
CHLORIDE SERPL-SCNC: 103 MMOL/L (ref 96–108)
CO2 SERPL-SCNC: 27 MMOL/L (ref 21–32)
CREAT SERPL-MCNC: 0.52 MG/DL (ref 0.6–1.3)
EOSINOPHIL # BLD AUTO: 0.06 THOUSAND/ÂΜL (ref 0–0.61)
EOSINOPHIL NFR BLD AUTO: 1 % (ref 0–6)
ERYTHROCYTE [DISTWIDTH] IN BLOOD BY AUTOMATED COUNT: 12.3 % (ref 11.6–15.1)
GFR SERPL CREATININE-BSD FRML MDRD: 99 ML/MIN/1.73SQ M
GLUCOSE SERPL-MCNC: 106 MG/DL (ref 65–140)
HCT VFR BLD AUTO: 44.7 % (ref 34.8–46.1)
HGB BLD-MCNC: 14.8 G/DL (ref 11.5–15.4)
IMM GRANULOCYTES # BLD AUTO: 0.04 THOUSAND/UL (ref 0–0.2)
IMM GRANULOCYTES NFR BLD AUTO: 1 % (ref 0–2)
LYMPHOCYTES # BLD AUTO: 1.36 THOUSANDS/ÂΜL (ref 0.6–4.47)
LYMPHOCYTES NFR BLD AUTO: 19 % (ref 14–44)
MCH RBC QN AUTO: 31.6 PG (ref 26.8–34.3)
MCHC RBC AUTO-ENTMCNC: 33.1 G/DL (ref 31.4–37.4)
MCV RBC AUTO: 95 FL (ref 82–98)
MONOCYTES # BLD AUTO: 0.51 THOUSAND/ÂΜL (ref 0.17–1.22)
MONOCYTES NFR BLD AUTO: 7 % (ref 4–12)
NEUTROPHILS # BLD AUTO: 5.01 THOUSANDS/ÂΜL (ref 1.85–7.62)
NEUTS SEG NFR BLD AUTO: 71 % (ref 43–75)
NRBC BLD AUTO-RTO: 0 /100 WBCS
P AXIS: 64 DEGREES
PLATELET # BLD AUTO: 260 THOUSANDS/UL (ref 149–390)
PMV BLD AUTO: 10.7 FL (ref 8.9–12.7)
POTASSIUM SERPL-SCNC: 4.6 MMOL/L (ref 3.5–5.3)
PR INTERVAL: 166 MS
QRS AXIS: 43 DEGREES
QRSD INTERVAL: 88 MS
QT INTERVAL: 358 MS
QTC INTERVAL: 426 MS
RBC # BLD AUTO: 4.69 MILLION/UL (ref 3.81–5.12)
SODIUM SERPL-SCNC: 137 MMOL/L (ref 135–147)
T WAVE AXIS: 50 DEGREES
VENTRICULAR RATE: 85 BPM
WBC # BLD AUTO: 7.02 THOUSAND/UL (ref 4.31–10.16)

## 2024-01-13 PROCEDURE — 99285 EMERGENCY DEPT VISIT HI MDM: CPT | Performed by: EMERGENCY MEDICINE

## 2024-01-13 PROCEDURE — 80048 BASIC METABOLIC PNL TOTAL CA: CPT | Performed by: EMERGENCY MEDICINE

## 2024-01-13 PROCEDURE — 84484 ASSAY OF TROPONIN QUANT: CPT | Performed by: EMERGENCY MEDICINE

## 2024-01-13 PROCEDURE — 96360 HYDRATION IV INFUSION INIT: CPT

## 2024-01-13 PROCEDURE — 36415 COLL VENOUS BLD VENIPUNCTURE: CPT | Performed by: EMERGENCY MEDICINE

## 2024-01-13 PROCEDURE — 93005 ELECTROCARDIOGRAM TRACING: CPT

## 2024-01-13 PROCEDURE — 99284 EMERGENCY DEPT VISIT MOD MDM: CPT

## 2024-01-13 PROCEDURE — 85025 COMPLETE CBC W/AUTO DIFF WBC: CPT | Performed by: EMERGENCY MEDICINE

## 2024-01-13 RX ADMIN — SODIUM CHLORIDE 500 ML: 0.9 INJECTION, SOLUTION INTRAVENOUS at 13:02

## 2024-01-13 NOTE — ED PROVIDER NOTES
"History  Chief Complaint   Patient presents with    Dizziness     Pt states \"I had a feeling come over me earlier, dazed and confused for a minute, I don't feel like myself\" pt states feeling dizzy for a short period of time around 1000     68-year-old female history of hypertension and breast cancer presenting with lightheadedness.  Patient reports positional lightheadedness.  Worse with standing.  Resolved within a few seconds.  History of similar that is reproducible.  Denies any chest pain shortness of breath.  Denies any other complaints.  Currently asymptomatic.  Reviewed.    Past Medical History:  No date: Breast cancer (HCC)      Comment:    No date: Ear problems  No date: Hyperlipidemia  No date: Hypertension  Family History: non-contributory  Social History          Prior to Admission Medications   Prescriptions Last Dose Informant Patient Reported? Taking?   Digestive Enzymes (DIGESTIVE ENZYME PO)  Self Yes No   Sig: Take by mouth 3 (three) times a day Taken after meals   Patient not taking: Reported on 2022   NON FORMULARY  Self Yes No   Sig: Kyolic   Patient not taking: Reported on 2022   Probiotic Product (PROBIOTIC PO)  Self Yes No   Sig: Take by mouth in the morning   Patient not taking: Reported on 2022   QUERCETIN PO  Self Yes No   Sig: Take by mouth 3 (three) times a week   Patient not taking: Reported on 2022   cholecalciferol (VITAMIN D3) 1,000 units tablet  Self Yes No   Sig: Take 1,000 Units by mouth daily   Patient not taking: Reported on 2022   fluticasone (FLONASE) 50 mcg/act nasal spray  Self No No   Si sprays each nostril once daily.   Patient not taking: Reported on 2023   lisinopril (ZESTRIL) 20 mg tablet  Self Yes No   Sig: Take 20 mg by mouth daily   Patient not taking: Reported on 2022      Facility-Administered Medications: None       Past Medical History:   Diagnosis Date    Breast cancer ([facility])         Ear problems     Hyperlipidemia  "    Hypertension        Past Surgical History:   Procedure Laterality Date    APPENDECTOMY      BREAST BIOPSY Left 2002    benign    BREAST LUMPECTOMY Right 5/5/2022    Procedure: SHAILESH  DIRECTED LUMPECTOMY;  Surgeon: Breanna Branch MD;  [facility]: [facility];  Service: Surgical Oncology    COLONOSCOPY      LYMPH NODE BIOPSY Right 5/5/2022    Procedure: LYMPHATIC MAPPING WITH BLUE AND RADIOACTIVE DYES, SENTINEL LYMPH  NODE BIOPSY, INJECTION IN OR AT 0930 BY DR BRANCH;  Surgeon: Breanna Branch MD;  Location[facility];  Service: Surgical Oncology    US BREAST SHAILESH  NEEDLE LOC RIGHT Right 4/12/2022    US GUIDANCE BREAST BIOPSY RIGHT EACH ADDITIONAL Right 4/12/2022    US GUIDED BREAST BIOPSY RIGHT COMPLETE Right 4/12/2022       Family History   Problem Relation Age of Onset    Breast cancer Mother 65    Heart disease Mother     Heart disease Father     No Known Problems Sister     Cancer Maternal Grandmother 82        either liver or pancreatic cancer not sure    No Known Problems Brother     No Known Problems Brother     No Known Problems Brother     Arrhythmia Son     No Known Problems Maternal Aunt     Uterine cancer Paternal Aunt 48    Breast cancer Paternal Aunt 49        maybe not sure     I have reviewed and agree with the history as documented.    E-Cigarette/Vaping    E-Cigarette Use Never User      E-Cigarette/Vaping Substances    Nicotine No     THC No     CBD No     Flavoring No     Other No     Unknown No      Social History     Tobacco Use    Smoking status: Never    Smokeless tobacco: Never   Vaping Use    Vaping status: Never Used   Substance Use Topics    Alcohol use: Yes     Alcohol/week: 4.0 standard drinks of alcohol     Types: 4 Glasses of wine per week     Comment: 4 glasses per night    Drug use: Never       Review of Systems   Constitutional:  Negative for appetite change, chills, diaphoresis, fever and unexpected weight change.   HENT:  Negative for congestion and  rhinorrhea.    Eyes:  Negative for photophobia and visual disturbance.   Respiratory:  Negative for cough, chest tightness and shortness of breath.    Cardiovascular:  Negative for chest pain, palpitations and leg swelling.   Gastrointestinal:  Negative for abdominal distention, abdominal pain, blood in stool, constipation, diarrhea, nausea and vomiting.   Genitourinary:  Negative for dysuria and hematuria.   Musculoskeletal:  Negative for back pain, joint swelling, neck pain and neck stiffness.   Skin:  Negative for color change, pallor, rash and wound.   Neurological:  Positive for light-headedness. Negative for dizziness, syncope, weakness and headaches.   Psychiatric/Behavioral:  Negative for agitation.    All other systems reviewed and are negative.      Physical Exam  Physical Exam  Vitals and nursing note reviewed.   Constitutional:       General: She is not in acute distress.     Appearance: Normal appearance. She is well-developed. She is not ill-appearing, toxic-appearing or diaphoretic.   HENT:      Head: Normocephalic and atraumatic.      Nose: Nose normal. No congestion or rhinorrhea.      Mouth/Throat:      Mouth: Mucous membranes are moist.      Pharynx: Oropharynx is clear. No oropharyngeal exudate or posterior oropharyngeal erythema.   Eyes:      General: No scleral icterus.        Right eye: No discharge.         Left eye: No discharge.      Extraocular Movements: Extraocular movements intact.      Conjunctiva/sclera: Conjunctivae normal.      Pupils: Pupils are equal, round, and reactive to light.   Neck:      Vascular: No JVD.      Trachea: No tracheal deviation.      Comments: Supple. Normal range of motion.   Cardiovascular:      Rate and Rhythm: Normal rate and regular rhythm.      Heart sounds: Normal heart sounds. No murmur heard.     No friction rub. No gallop.      Comments: Normal rate and regular rhythm  Pulmonary:      Effort: Pulmonary effort is normal. No respiratory distress.       Breath sounds: Normal breath sounds. No stridor. No wheezing or rales.      Comments: Clear to auscultation bilaterally  Chest:      Chest wall: No tenderness.   Abdominal:      General: Bowel sounds are normal. There is no distension.      Palpations: Abdomen is soft.      Tenderness: There is no abdominal tenderness. There is no right CVA tenderness, left CVA tenderness, guarding or rebound.      Comments: Soft, nontender, nondistended.  Normal bowel sounds throughout   Musculoskeletal:         General: No swelling, tenderness, deformity or signs of injury. Normal range of motion.      Cervical back: Normal range of motion and neck supple. No rigidity. No muscular tenderness.      Right lower leg: No edema.      Left lower leg: No edema.   Lymphadenopathy:      Cervical: No cervical adenopathy.   Skin:     General: Skin is warm and dry.      Coloration: Skin is not pale.      Findings: No erythema or rash.   Neurological:      General: No focal deficit present.      Mental Status: She is alert. Mental status is at baseline.      Sensory: No sensory deficit.      Motor: No weakness or abnormal muscle tone.      Coordination: Coordination normal.      Gait: Gait normal.      Comments: Alert.  Strength and sensation grossly intact.  Ambulatory without difficulty at baseline.    Psychiatric:         Behavior: Behavior normal.         Thought Content: Thought content normal.         Vital Signs  ED Triage Vitals   Temperature Pulse Respirations Blood Pressure SpO2   01/13/24 1134 01/13/24 1134 01/13/24 1250 01/13/24 1250 01/13/24 1134   97.7 °F (36.5 °C) 95 20 (!) 200/106 97 %      Temp Source Heart Rate Source Patient Position - Orthostatic VS BP Location FiO2 (%)   01/13/24 1134 01/13/24 1134 01/13/24 1134 01/13/24 1250 --   Oral Monitor Sitting Left arm       Pain Score       --                  Vitals:    01/13/24 1134 01/13/24 1250   BP:  (!) 200/106   Pulse: 95    Patient Position - Orthostatic VS: Sitting           Visual Acuity      ED Medications  Medications   sodium chloride 0.9 % bolus 500 mL (0 mL Intravenous Stopped 1/13/24 1425)       Diagnostic Studies  Results Reviewed       Procedure Component Value Units Date/Time    HS Troponin I 4hr [[id number]]     Lab Status: No result Specimen: Blood     Basic metabolic panel [[id number]]  (Abnormal) Collected: 01/13/24 1303    Lab Status: Final result Specimen: Blood from Arm, Left Updated: 01/13/24 1441     Sodium 137 mmol/L      Potassium 4.6 mmol/L      Chloride 103 mmol/L      CO2 27 mmol/L      ANION GAP 7 mmol/L      BUN 15 mg/dL      Creatinine 0.52 mg/dL      Glucose 106 mg/dL      Calcium 10.4 mg/dL      eGFR 99 ml/min/1.73sq m     Narrative:      National Kidney Disease Foundation guidelines for Chronic Kidney Disease (CKD):     Stage 1 with normal or high GFR (GFR > 90 mL/min/1.73 square meters)    Stage 2 Mild CKD (GFR = 60-89 mL/min/1.73 square meters)    Stage 3A Moderate CKD (GFR = 45-59 mL/min/1.73 square meters)    Stage 3B Moderate CKD (GFR = 30-44 mL/min/1.73 square meters)    Stage 4 Severe CKD (GFR = 15-29 mL/min/1.73 square meters)    Stage 5 End Stage CKD (GFR <15 mL/min/1.73 square meters)  Note: GFR calculation is accurate only with a steady state creatinine    HS Troponin 0hr (reflex protocol) [[id number]]  (Normal) Collected: 01/13/24 1303    Lab Status: Final result Specimen: Blood from Arm, Left Updated: 01/13/24 1348     hs TnI 0hr 8 ng/L     HS Troponin I 2hr [[id number]]     Lab Status: No result Specimen: Blood     CBC and differential [[id number]] Collected: 01/13/24 1303    Lab Status: Final result Specimen: Blood from Arm, Left Updated: 01/13/24 1314     WBC 7.02 Thousand/uL      RBC 4.69 Million/uL      Hemoglobin 14.8 g/dL      Hematocrit 44.7 %      MCV 95 fL      MCH 31.6 pg      MCHC 33.1 g/dL      RDW 12.3 %      MPV 10.7 fL      Platelets 260 Thousands/uL      nRBC 0 /100 WBCs      Neutrophils Relative 71 %      Immat GRANS % 1 %       Lymphocytes Relative 19 %      Monocytes Relative 7 %      Eosinophils Relative 1 %      Basophils Relative 1 %      Neutrophils Absolute 5.01 Thousands/µL      Immature Grans Absolute 0.04 Thousand/uL      Lymphocytes Absolute 1.36 Thousands/µL      Monocytes Absolute 0.51 Thousand/µL      Eosinophils Absolute 0.06 Thousand/µL      Basophils Absolute 0.04 Thousands/µL                    No orders to display              Procedures  Procedures         ED Course             HEART Risk Score      Flowsheet Row Most Recent Value   Heart Score Risk Calculator    History 0 Filed at: 01/13/2024 1514   ECG 1 Filed at: 01/13/2024 1514   Age 2 Filed at: 01/13/2024 1514   Risk Factors 1 Filed at: 01/13/2024 1514   Troponin 0 Filed at: 01/13/2024 1514   HEART Score 4 Filed at: 01/13/2024 1514                                        Medical Decision Making  68-year-old female history of hypertension and breast cancer presenting with lightheadedness.  Reproducible lightheadedness currently asymptomatic with benign exam.  Plan for cardiac evaluation including EKG and troponin plus basic labs.  IV fluids.  Reassess.    EKG interpreted by me notable for normal sinus rhythm and nonspecific ST abnormality.  Labs interpreted by me without significant acute process.  No chest pain.  Patient remains asymptomatic.  Lightheadedness and syncope precautions. Discussed results and recommendations. Advised follow up PCP. Medication recommendations. Given instructions and return precautions. Patient/family at bedside acknowledged understanding of all written and verbal instructions and return precautions. Discharged.     Amount and/or Complexity of Data Reviewed  Labs: ordered.             Disposition  Final diagnoses:   Lightheadedness     Time reflects when diagnosis was documented in both MDM as applicable and the Disposition within this note       Time User Action Codes Description Comment    1/13/2024  1:08 PM Neeraj Alvares Add [R42]  Lightheadedness           ED Disposition       ED Disposition   Discharge    Condition   Stable    Date/Time   Sat Jan 13, 2024 1358    Comment   Gaby Parada discharge to home/self care.                   Follow-up Information       Follow up With Specialties Details Why Contact Jazmine Lozano DO Family Medicine Schedule an appointment as soon as possible for a visit in 1 week  [address]  [facility] [address]  [phone]              Discharge Medication List as of 1/13/2024  1:58 PM        CONTINUE these medications which have NOT CHANGED    Details   cholecalciferol (VITAMIN D3) 1,000 units tablet Take 1,000 Units by mouth daily, Historical Med      Digestive Enzymes (DIGESTIVE ENZYME PO) Take by mouth 3 (three) times a day Taken after meals, Historical Med      fluticasone (FLONASE) 50 mcg/act nasal spray 2 sprays each nostril once daily., Normal      lisinopril (ZESTRIL) 20 mg tablet Take 20 mg by mouth daily, Historical Med      NON FORMULARY Kyolic, Historical Med      Probiotic Product (PROBIOTIC PO) Take by mouth in the morning, Historical Med      QUERCETIN PO Take by mouth 3 (three) times a week, Historical Med             No discharge procedures on file.    PDMP Review         Value Time User    PDMP Reviewed  Yes 4/20/2022 11:30 AM Breanna Branch MD            ED Provider  Electronically Signed by             Neeraj Alvares MD  01/13/24 [id number]

## 2024-04-02 ENCOUNTER — HOSPITAL ENCOUNTER (OUTPATIENT)
Dept: MAMMOGRAPHY | Facility: CLINIC | Age: 69
Discharge: HOME | End: 2024-04-02
Payer: MEDICARE

## 2024-04-02 VITALS — BODY MASS INDEX: 23.82 KG/M2 | WEIGHT: 143 LBS | HEIGHT: 65 IN

## 2024-04-02 DIAGNOSIS — Z17.0 MALIGNANT NEOPLASM OF LOWER-OUTER QUADRANT OF RIGHT BREAST OF FEMALE, ESTROGEN RECEPTOR POSITIVE (HCC): ICD-10-CM

## 2024-04-02 DIAGNOSIS — C50.511 MALIGNANT NEOPLASM OF LOWER-OUTER QUADRANT OF RIGHT BREAST OF FEMALE, ESTROGEN RECEPTOR POSITIVE (HCC): ICD-10-CM

## 2024-04-02 PROCEDURE — 77066 DX MAMMO INCL CAD BI: CPT

## 2024-04-02 PROCEDURE — G0279 TOMOSYNTHESIS, MAMMO: HCPCS

## 2025-01-25 NOTE — TELEPHONE ENCOUNTER
GANGAS consulted for Midline insertion in real time using u/s guidance.     Indication: IV antibiotics  Gauge: 18  Location: right brachial  Length in cm: 10  Max dwell date: 2/22/25  Lot #: [id number]    Image saved and uploaded to EMR    T/c from pt - pt states she received results in the mail regarding her mammogram on 4/12/22 - she said it states "needs further evaluation" - she is upset - more upset that it's taken so long and that nothing was mentioned at her last appt with Dr Vlad Thomas  She is not blaming Dr Vlad Thomas but is just clearly upset by it all

## 2025-04-15 ENCOUNTER — TELEPHONE (OUTPATIENT)
Dept: FAMILY MEDICINE CLINIC | Facility: CLINIC | Age: 70
End: 2025-04-15

## 2025-04-16 ENCOUNTER — VBI (OUTPATIENT)
Dept: ADMINISTRATIVE | Facility: OTHER | Age: 70
End: 2025-04-16

## 2025-04-16 NOTE — TELEPHONE ENCOUNTER
Patient contacted to schedule Annual Wellness Visit.   OUTREACH  MADE BY PCP OFFICE WITH IN 3 MONTHS     Thank you.  Effie De La Cruz MA  [facility]   Topical Sulfur Applications Pregnancy And Lactation Text: This medication is Pregnancy Category C and has an unknown safety profile during pregnancy. It is unknown if this topical medication is excreted in breast milk.

## 2025-05-08 ENCOUNTER — TELEPHONE (OUTPATIENT)
Dept: FAMILY MEDICINE CLINIC | Facility: CLINIC | Age: 70
End: 2025-05-08

## 2025-05-08 NOTE — TELEPHONE ENCOUNTER
Spoke with patient in attempt to schedule Medicare AWV. She stated she has not moved or found a new PCP. She also declined scheduling at this time. She will call back in about 2 weeks after she gets back from a trip.

## 2025-05-20 ENCOUNTER — VBI (OUTPATIENT)
Dept: ADMINISTRATIVE | Facility: OTHER | Age: 70
End: 2025-05-20

## 2025-05-20 NOTE — TELEPHONE ENCOUNTER
05/20/25 11:42 AM    The patient was called and a message was left for patient to return a call to the [facility] Department at [facility]: Phone [phone] .    Thank you.  Lisa Gonzales MA  [facility]

## 2025-07-28 ENCOUNTER — DOCUMENTATION (OUTPATIENT)
Dept: ADMINISTRATIVE | Facility: OTHER | Age: 70
End: 2025-07-28

## (undated) DEVICE — SUT VICRYL 2-0 SH 27 IN UNDYED J417H

## (undated) DEVICE — SHEATH, GUIDE, SAVI SCOUT®: Brand: SAVI SCOUT®

## (undated) DEVICE — PACK UNIVERSAL DRAPES SUB-Q ICD

## (undated) DEVICE — SMOKE EVACUATION TUBING WITH 8 IN INTEGRAL WAND AND SPONGE GUARD: Brand: BUFFALO FILTER

## (undated) DEVICE — TELFA ADHESIVE ISLAND DRESSING: Brand: TELFA

## (undated) DEVICE — TUBING SUCTION 5MM X 12 FT

## (undated) DEVICE — CHEST/BREAST DRAPE: Brand: CONVERTORS

## (undated) DEVICE — PLUMEPEN PRO 10FT

## (undated) DEVICE — COBAN 4 IN STERILE

## (undated) DEVICE — GLOVE SRG BIOGEL 7

## (undated) DEVICE — 3M™ STERI-STRIP™ REINFORCED ADHESIVE SKIN CLOSURES, R1547, 1/2 IN X 4 IN (12 MM X 100 MM), 6 STRIPS/ENVELOPE: Brand: 3M™ STERI-STRIP™

## (undated) DEVICE — SKIN MARKER DUAL TIP WITH RULER CAP, FLEXIBLE RULER AND LABELS: Brand: DEVON

## (undated) DEVICE — SUT MONOCRYL 4-0 PS-2 18 IN Y496G

## (undated) DEVICE — ELECTRODE EZ CLEAN BLADE -0012

## (undated) DEVICE — MAYO STAND COVER: Brand: CONVERTORS

## (undated) DEVICE — INTENDED FOR TISSUE SEPARATION, AND OTHER PROCEDURES THAT REQUIRE A SHARP SURGICAL BLADE TO PUNCTURE OR CUT.: Brand: BARD-PARKER SAFETY BLADES SIZE 15, STERILE

## (undated) DEVICE — SUT VICRYL 2-0 REEL 54 IN J286G

## (undated) DEVICE — GAUZE SPONGES,USP TYPE VII GAUZE, 12 PLY: Brand: CURITY

## (undated) DEVICE — MEDI-VAC YANK SUCT HNDL W/TPRD BULBOUS TIP: Brand: CARDINAL HEALTH

## (undated) DEVICE — SUT SILK 2-0 SH 30 IN K833H

## (undated) DEVICE — LIGHT HANDLE COVER SLEEVE DISP BLUE STELLAR

## (undated) DEVICE — IMPERVIOUS STOCKINETTE: Brand: DEROYAL

## (undated) DEVICE — ELECTRODE BLADE MOD E-Z CLEAN  2.75IN 7CM -0012AM

## (undated) DEVICE — SUT VICRYL 3-0 SH 27 IN J416H

## (undated) DEVICE — DRAPE EQUIPMENT RF WAND

## (undated) DEVICE — MARGIN MARKER SET

## (undated) DEVICE — UTILITY MARKER,BLACK WITH LABELS: Brand: DEVON

## (undated) DEVICE — GLOVE SRG BIOGEL ORTHOPEDIC 7.5

## (undated) DEVICE — BETHLEHEM UNIVERSAL MINOR GEN: Brand: CARDINAL HEALTH

## (undated) DEVICE — CHLORAPREP HI-LITE 26ML ORANGE

## (undated) DEVICE — DRAPE SHEET THREE QUARTER

## (undated) DEVICE — PENCIL ELECTROSURG E-Z CLEAN -0035H

## (undated) DEVICE — ADHESIVE SKIN CLOSR DERMABOND PRINEO

## (undated) DEVICE — PAD GROUNDING ADULT